# Patient Record
Sex: FEMALE | Race: WHITE | NOT HISPANIC OR LATINO | Employment: FULL TIME | URBAN - METROPOLITAN AREA
[De-identification: names, ages, dates, MRNs, and addresses within clinical notes are randomized per-mention and may not be internally consistent; named-entity substitution may affect disease eponyms.]

---

## 2019-08-22 ENCOUNTER — HOSPITAL ENCOUNTER (EMERGENCY)
Facility: HOSPITAL | Age: 24
Discharge: HOME | End: 2019-08-22
Attending: EMERGENCY MEDICINE
Payer: COMMERCIAL

## 2019-08-22 VITALS
SYSTOLIC BLOOD PRESSURE: 125 MMHG | WEIGHT: 268 LBS | RESPIRATION RATE: 20 BRPM | BODY MASS INDEX: 40.75 KG/M2 | TEMPERATURE: 98 F | OXYGEN SATURATION: 98 % | DIASTOLIC BLOOD PRESSURE: 80 MMHG | HEART RATE: 63 BPM

## 2019-08-22 DIAGNOSIS — R10.31 RLQ ABDOMINAL PAIN: Primary | ICD-10-CM

## 2019-08-22 LAB
ALBUMIN SERPL BCP-MCNC: 3.4 G/DL (ref 3.5–5.2)
ALP SERPL-CCNC: 78 U/L (ref 55–135)
ALT SERPL W/O P-5'-P-CCNC: 10 U/L (ref 10–44)
ANION GAP SERPL CALC-SCNC: 11 MMOL/L (ref 8–16)
AST SERPL-CCNC: 12 U/L (ref 10–40)
B-HCG UR QL: NEGATIVE
BILIRUB SERPL-MCNC: 0.2 MG/DL (ref 0.1–1)
BILIRUB UR QL STRIP: NEGATIVE
BUN SERPL-MCNC: 10 MG/DL (ref 6–20)
CALCIUM SERPL-MCNC: 9.2 MG/DL (ref 8.7–10.5)
CHLORIDE SERPL-SCNC: 105 MMOL/L (ref 95–110)
CLARITY UR: CLEAR
CO2 SERPL-SCNC: 23 MMOL/L (ref 23–29)
COLOR UR: YELLOW
CREAT SERPL-MCNC: 0.7 MG/DL (ref 0.5–1.4)
CTP QC/QA: YES
ERYTHROCYTE [DISTWIDTH] IN BLOOD BY AUTOMATED COUNT: 12.8 % (ref 11.5–14.5)
EST. GFR  (AFRICAN AMERICAN): >60 ML/MIN/1.73 M^2
EST. GFR  (NON AFRICAN AMERICAN): >60 ML/MIN/1.73 M^2
GLUCOSE SERPL-MCNC: 103 MG/DL (ref 70–110)
GLUCOSE UR QL STRIP: NEGATIVE
HCT VFR BLD AUTO: 39.7 % (ref 37–48.5)
HGB BLD-MCNC: 13.1 G/DL (ref 12–16)
HGB UR QL STRIP: NEGATIVE
KETONES UR QL STRIP: NEGATIVE
LEUKOCYTE ESTERASE UR QL STRIP: NEGATIVE
LIPASE SERPL-CCNC: 25 U/L (ref 4–60)
MCH RBC QN AUTO: 28.4 PG (ref 27–31)
MCHC RBC AUTO-ENTMCNC: 33 G/DL (ref 32–36)
MCV RBC AUTO: 86 FL (ref 82–98)
NITRITE UR QL STRIP: NEGATIVE
PH UR STRIP: 7 [PH] (ref 5–8)
PLATELET # BLD AUTO: 324 K/UL (ref 150–350)
PMV BLD AUTO: 9.6 FL (ref 9.2–12.9)
POTASSIUM SERPL-SCNC: 4.5 MMOL/L (ref 3.5–5.1)
PROT SERPL-MCNC: 7.3 G/DL (ref 6–8.4)
PROT UR QL STRIP: NEGATIVE
RBC # BLD AUTO: 4.61 M/UL (ref 4–5.4)
SODIUM SERPL-SCNC: 139 MMOL/L (ref 136–145)
SP GR UR STRIP: 1.01 (ref 1–1.03)
URN SPEC COLLECT METH UR: NORMAL
UROBILINOGEN UR STRIP-ACNC: NEGATIVE EU/DL
WBC # BLD AUTO: 7.7 K/UL (ref 3.9–12.7)

## 2019-08-22 PROCEDURE — 80053 COMPREHEN METABOLIC PANEL: CPT

## 2019-08-22 PROCEDURE — 96375 TX/PRO/DX INJ NEW DRUG ADDON: CPT

## 2019-08-22 PROCEDURE — 96374 THER/PROPH/DIAG INJ IV PUSH: CPT

## 2019-08-22 PROCEDURE — 81025 URINE PREGNANCY TEST: CPT | Performed by: EMERGENCY MEDICINE

## 2019-08-22 PROCEDURE — 85027 COMPLETE CBC AUTOMATED: CPT

## 2019-08-22 PROCEDURE — 96361 HYDRATE IV INFUSION ADD-ON: CPT

## 2019-08-22 PROCEDURE — 99284 EMERGENCY DEPT VISIT MOD MDM: CPT | Mod: 25

## 2019-08-22 PROCEDURE — 25000003 PHARM REV CODE 250: Performed by: EMERGENCY MEDICINE

## 2019-08-22 PROCEDURE — 63600175 PHARM REV CODE 636 W HCPCS: Performed by: EMERGENCY MEDICINE

## 2019-08-22 PROCEDURE — 83690 ASSAY OF LIPASE: CPT

## 2019-08-22 PROCEDURE — 81003 URINALYSIS AUTO W/O SCOPE: CPT

## 2019-08-22 RX ORDER — MORPHINE SULFATE 4 MG/ML
4 INJECTION, SOLUTION INTRAMUSCULAR; INTRAVENOUS
Status: COMPLETED | OUTPATIENT
Start: 2019-08-22 | End: 2019-08-22

## 2019-08-22 RX ORDER — KETOROLAC TROMETHAMINE 30 MG/ML
15 INJECTION, SOLUTION INTRAMUSCULAR; INTRAVENOUS
Status: COMPLETED | OUTPATIENT
Start: 2019-08-22 | End: 2019-08-22

## 2019-08-22 RX ORDER — SODIUM CHLORIDE 9 MG/ML
500 INJECTION, SOLUTION INTRAVENOUS
Status: COMPLETED | OUTPATIENT
Start: 2019-08-22 | End: 2019-08-22

## 2019-08-22 RX ORDER — DICYCLOMINE HYDROCHLORIDE 10 MG/1
20 CAPSULE ORAL
Status: COMPLETED | OUTPATIENT
Start: 2019-08-22 | End: 2019-08-22

## 2019-08-22 RX ORDER — ONDANSETRON 2 MG/ML
4 INJECTION INTRAMUSCULAR; INTRAVENOUS
Status: COMPLETED | OUTPATIENT
Start: 2019-08-22 | End: 2019-08-22

## 2019-08-22 RX ADMIN — KETOROLAC TROMETHAMINE 15 MG: 30 INJECTION, SOLUTION INTRAMUSCULAR at 07:08

## 2019-08-22 RX ADMIN — ONDANSETRON 4 MG: 2 INJECTION INTRAMUSCULAR; INTRAVENOUS at 07:08

## 2019-08-22 RX ADMIN — MORPHINE SULFATE 4 MG: 4 INJECTION INTRAVENOUS at 07:08

## 2019-08-22 RX ADMIN — SODIUM CHLORIDE 500 ML: 0.9 INJECTION, SOLUTION INTRAVENOUS at 07:08

## 2019-08-22 RX ADMIN — DICYCLOMINE HYDROCHLORIDE 20 MG: 10 CAPSULE ORAL at 08:08

## 2019-08-22 NOTE — ED NOTES
Pt presents to the ED w/ c/ of RLQ abd pain with nausea/ vomiting for the past 2days. Pt reports 3 episodes of emesis for the past 2 days. Denies diarrhea or dysuria. Pt is NAD.

## 2019-08-22 NOTE — ED PROVIDER NOTES
Encounter Date: 8/22/2019    SCRIBE #1 NOTE: I, Kun Frederick, am scribing for, and in the presence of,  . I have scribed the entire note.       History     Chief Complaint   Patient presents with    Abdominal Pain     To ER with c/o RLQ abd pain and nausea/vomiting x 2 days.  Pt denies urinary symptoms.    Nausea    Vomiting     Kathy Beltrán is a 24 y.o. female who  has no past medical history on file.    The patient presents to the ED due to abdominal pain with associated nausea and vomiting.   The pain has been intermittent for the last 2 days, located to RLQ, and has been intermittent. She reports the pain has been gradually worsening.   She denies any fever, vaginal bleeding/discharge, dysuria, hematuria, constipation/diarrhea, or appetite change. She denies any prior similar episodes in the past. She reports no history of kidney stones or ovarian cysts. She did not take anything for her symptoms at home.   LMP was 1 month ago.    The history is provided by the patient.     Review of patient's allergies indicates:   Allergen Reactions    Pcn [penicillins]     Sulfa (sulfonamide antibiotics)      No past medical history on file.  Past Surgical History:   Procedure Laterality Date    BREAST BIOPSY      CHOLECYSTECTOMY      CHOLESTEATOMA EXCISION       No family history on file.  Social History     Tobacco Use    Smoking status: Never Smoker   Substance Use Topics    Alcohol use: No    Drug use: Not on file     Review of Systems   Constitutional: Negative for chills and fever.   HENT: Negative for sore throat.    Respiratory: Negative for cough and shortness of breath.    Cardiovascular: Negative for chest pain.   Gastrointestinal: Positive for abdominal pain, nausea and vomiting. Negative for diarrhea.   Genitourinary: Negative for dysuria, frequency, urgency and vaginal bleeding.   Musculoskeletal: Negative for back pain.   Skin: Negative for rash and wound.   Neurological: Negative for  syncope and weakness.   Hematological: Does not bruise/bleed easily.   Psychiatric/Behavioral: Negative for agitation, behavioral problems and confusion.       Physical Exam     Initial Vitals [08/22/19 0700]   BP Pulse Resp Temp SpO2   (!) 158/99 83 18 98.8 °F (37.1 °C) 98 %      MAP       --         Physical Exam    Nursing note and vitals reviewed.  Constitutional: She appears well-developed and well-nourished. She is not diaphoretic. No distress.   HENT:   Head: Normocephalic and atraumatic.   Mouth/Throat: Oropharynx is clear and moist.   Eyes: EOM are normal. Pupils are equal, round, and reactive to light.   Neck: No tracheal deviation present.   Cardiovascular: Normal rate, regular rhythm, normal heart sounds and intact distal pulses.   Pulmonary/Chest: Breath sounds normal. No stridor. No respiratory distress. She has no wheezes.   Abdominal: Soft. Bowel sounds are normal. She exhibits no distension and no mass. There is tenderness in the right lower quadrant. There is no rigidity, no rebound, no guarding, no CVA tenderness, no tenderness at McBurney's point and negative Amaro's sign.   Obese abdomen, soft, no rebound/guarding.   Musculoskeletal: Normal range of motion. She exhibits no edema.   Neurological: She is alert and oriented to person, place, and time. She has normal strength. No cranial nerve deficit or sensory deficit.   Skin: Skin is warm and dry. Capillary refill takes less than 2 seconds. No pallor.   Psychiatric: She has a normal mood and affect. Her behavior is normal. Thought content normal.         ED Course   Procedures  Labs Reviewed   COMPREHENSIVE METABOLIC PANEL - Abnormal; Notable for the following components:       Result Value    Albumin 3.4 (*)     All other components within normal limits   URINALYSIS, REFLEX TO URINE CULTURE    Narrative:     Preferred Collection Type->Urine, Clean Catch   CBC WITHOUT DIFFERENTIAL   LIPASE   POCT URINE PREGNANCY          Imaging Results     None          Medical Decision Making:   Differential Diagnosis:   Differential Diagnosis includes, but is not limited to:  AAA, aortic dissection, mesenteric ischemia, perforated viscous, MI/ACS, SBO/volvulus, incarcerated/strangulated hernia, intussusception, ileus, appendicitis, cholecystitis, cholangitis, diverticulitis, esophagitis, hepatitis, nephrolithiasis, pancreatitis, gastroenteritis, colitis, IBD/IBS, biliary colic, GERD, PUD, constipation, UTI/pyelonephritis,  disorder.    Clinical Tests:   Lab Tests: Ordered and Reviewed  Upon re-evaluation, the patient's status has improved.  After complete ED evaluation, clinical impression is most consistent with RLQ abdominal pain.  PCP follow-up within 2-3 days was recommended.    After taking into careful account the patient's history, physical exam findings, as well as empirical and objective data obtained throughout ED workup, I feel no emergent medical condition has been identified. No further evaluation or admission was felt to be required, and the patient is stable for discharge from the ED. The patient and any additional family present were updated with test results, overall clinical impression, and recommended further plan of care, including discharge instructions as provided and outpatient follow-up for continued evaluation and management as needed. All questions were answered. The patient expressed understanding and agreed with current plan for discharge and follow-up plan of care. Strict ED return precautions were provided, including return/worsening of current symptoms, new symptoms, or any other concerns.                     ED Course as of Aug 22 1022   Thu Aug 22, 2019   0727 24-year-old female presents to ED with 2 days of generalized abdominal pain, gradually worsening, now located to right lower quadrant, associated with nausea and vomiting. Denies any fever, back pain, or urinary complaints. History of cholecystectomy.  On arrival, vitals  unremarkable, exam with right lower quadrant tenderness, no rebound or guarding.  Will obtain labs, treat symptomatically, and consider CT for further evaluation pending reassessment.    [SS]   0911 Labs unremarkable. On reassessment, patient reports no significant change in her pain level.  Discussed results extensively with patient and , and patient was offered options including CT A/P now versus watchful waiting with symptomatic care at home, as early appendicitis remains on DDX.  After long discussion of options, patient and family comfortable with return home with close observation; she will return to the ED for worsening symptoms, fever, nausea/vomiting, or any other concerns.  She is comfortable with plan at this time.    [SS]      ED Course User Index  [SS] Philippe Billingsley MD     Clinical Impression:       ICD-10-CM ICD-9-CM   1. RLQ abdominal pain R10.31 789.03           Disposition:   Disposition: Discharged  Condition: Stable         I, Dr. Philippe Billingsley, personally performed the services described in this documentation. All medical record entries made by the scribe were at my direction and in my presence.  I have reviewed the chart and agree that the record reflects my personal performance and is accurate and complete.     Philippe Billingsley MD.  10:23 AM 08/22/2019         Philippe Billingsley MD  08/22/19 1025

## 2019-08-23 ENCOUNTER — HOSPITAL ENCOUNTER (OUTPATIENT)
Dept: RADIOLOGY | Facility: HOSPITAL | Age: 24
Discharge: HOME | End: 2019-08-23
Attending: SURGERY
Payer: COMMERCIAL

## 2019-08-23 DIAGNOSIS — R10.31 ABDOMINAL PAIN, RIGHT LOWER QUADRANT: Primary | ICD-10-CM

## 2019-08-23 DIAGNOSIS — R10.31 ABDOMINAL PAIN, RIGHT LOWER QUADRANT: ICD-10-CM

## 2019-08-23 PROCEDURE — 76705 ECHO EXAM OF ABDOMEN: CPT | Mod: TC

## 2019-08-23 PROCEDURE — 76705 ECHO EXAM OF ABDOMEN: CPT | Mod: 26,,, | Performed by: RADIOLOGY

## 2019-08-23 PROCEDURE — 76705 US ABDOMEN LIMITED: ICD-10-PCS | Mod: 26,,, | Performed by: RADIOLOGY

## 2021-03-29 ENCOUNTER — IMMUNIZATION (OUTPATIENT)
Dept: FAMILY MEDICINE | Facility: CLINIC | Age: 26
End: 2021-03-29
Payer: COMMERCIAL

## 2021-03-29 DIAGNOSIS — Z23 NEED FOR VACCINATION: Primary | ICD-10-CM

## 2021-03-29 PROCEDURE — 91300 COVID-19, MRNA, LNP-S, PF, 30 MCG/0.3 ML DOSE VACCINE: CPT | Mod: PBBFAC | Performed by: FAMILY MEDICINE

## 2021-04-20 ENCOUNTER — IMMUNIZATION (OUTPATIENT)
Dept: FAMILY MEDICINE | Facility: CLINIC | Age: 26
End: 2021-04-20
Payer: COMMERCIAL

## 2021-04-20 DIAGNOSIS — Z23 NEED FOR VACCINATION: Primary | ICD-10-CM

## 2021-04-20 PROCEDURE — 91300 COVID-19, MRNA, LNP-S, PF, 30 MCG/0.3 ML DOSE VACCINE: CPT | Mod: PBBFAC | Performed by: FAMILY MEDICINE

## 2021-04-20 PROCEDURE — 0002A COVID-19, MRNA, LNP-S, PF, 30 MCG/0.3 ML DOSE VACCINE: CPT | Mod: PBBFAC | Performed by: FAMILY MEDICINE

## 2023-02-07 ENCOUNTER — HOSPITAL ENCOUNTER (EMERGENCY)
Facility: HOSPITAL | Age: 28
Discharge: HOME | End: 2023-02-07
Attending: SURGERY
Payer: COMMERCIAL

## 2023-02-07 VITALS
RESPIRATION RATE: 18 BRPM | OXYGEN SATURATION: 100 % | SYSTOLIC BLOOD PRESSURE: 147 MMHG | DIASTOLIC BLOOD PRESSURE: 97 MMHG | TEMPERATURE: 98 F | HEART RATE: 65 BPM | WEIGHT: 259.5 LBS | BODY MASS INDEX: 39.45 KG/M2

## 2023-02-07 DIAGNOSIS — K29.70 GASTRITIS, PRESENCE OF BLEEDING UNSPECIFIED, UNSPECIFIED CHRONICITY, UNSPECIFIED GASTRITIS TYPE: Primary | ICD-10-CM

## 2023-02-07 LAB
ALBUMIN SERPL BCP-MCNC: 4 G/DL (ref 3.5–5.2)
ALP SERPL-CCNC: 63 U/L (ref 55–135)
ALT SERPL W/O P-5'-P-CCNC: 12 U/L (ref 10–44)
AMPHET+METHAMPHET UR QL: NEGATIVE
ANION GAP SERPL CALC-SCNC: 11 MMOL/L (ref 8–16)
AST SERPL-CCNC: 15 U/L (ref 10–40)
B-HCG UR QL: NEGATIVE
BARBITURATES UR QL SCN>200 NG/ML: NEGATIVE
BASOPHILS # BLD AUTO: 0.03 K/UL (ref 0–0.2)
BASOPHILS NFR BLD: 0.3 % (ref 0–1.9)
BENZODIAZ UR QL SCN>200 NG/ML: NEGATIVE
BILIRUB SERPL-MCNC: 0.5 MG/DL (ref 0.1–1)
BILIRUB UR QL STRIP: NEGATIVE
BUN SERPL-MCNC: 12 MG/DL (ref 6–20)
BZE UR QL SCN: NEGATIVE
CALCIUM SERPL-MCNC: 9.5 MG/DL (ref 8.7–10.5)
CANNABINOIDS UR QL SCN: NEGATIVE
CHLORIDE SERPL-SCNC: 106 MMOL/L (ref 95–110)
CLARITY UR: CLEAR
CO2 SERPL-SCNC: 22 MMOL/L (ref 23–29)
COLOR UR: YELLOW
CREAT SERPL-MCNC: 0.8 MG/DL (ref 0.5–1.4)
CREAT UR-MCNC: 81.8 MG/DL (ref 15–325)
DIFFERENTIAL METHOD: NORMAL
EOSINOPHIL # BLD AUTO: 0.1 K/UL (ref 0–0.5)
EOSINOPHIL NFR BLD: 1.4 % (ref 0–8)
ERYTHROCYTE [DISTWIDTH] IN BLOOD BY AUTOMATED COUNT: 13.2 % (ref 11.5–14.5)
EST. GFR  (NO RACE VARIABLE): >60 ML/MIN/1.73 M^2
GLUCOSE SERPL-MCNC: 87 MG/DL (ref 70–110)
GLUCOSE UR QL STRIP: NEGATIVE
HCT VFR BLD AUTO: 44.6 % (ref 37–48.5)
HGB BLD-MCNC: 14.4 G/DL (ref 12–16)
HGB UR QL STRIP: NEGATIVE
IMM GRANULOCYTES # BLD AUTO: 0.02 K/UL (ref 0–0.04)
IMM GRANULOCYTES NFR BLD AUTO: 0.2 % (ref 0–0.5)
KETONES UR QL STRIP: NEGATIVE
LEUKOCYTE ESTERASE UR QL STRIP: NEGATIVE
LIPASE SERPL-CCNC: 28 U/L (ref 4–60)
LYMPHOCYTES # BLD AUTO: 3 K/UL (ref 1–4.8)
LYMPHOCYTES NFR BLD: 32.4 % (ref 18–48)
MCH RBC QN AUTO: 28 PG (ref 27–31)
MCHC RBC AUTO-ENTMCNC: 32.3 G/DL (ref 32–36)
MCV RBC AUTO: 87 FL (ref 82–98)
METHADONE UR QL SCN>300 NG/ML: NEGATIVE
MONOCYTES # BLD AUTO: 0.6 K/UL (ref 0.3–1)
MONOCYTES NFR BLD: 6.1 % (ref 4–15)
NEUTROPHILS # BLD AUTO: 5.5 K/UL (ref 1.8–7.7)
NEUTROPHILS NFR BLD: 59.6 % (ref 38–73)
NITRITE UR QL STRIP: NEGATIVE
NRBC BLD-RTO: 0 /100 WBC
OPIATES UR QL SCN: NEGATIVE
PCP UR QL SCN>25 NG/ML: NEGATIVE
PH UR STRIP: 6 [PH] (ref 5–8)
PLATELET # BLD AUTO: 258 K/UL (ref 150–450)
PMV BLD AUTO: 9.9 FL (ref 9.2–12.9)
POTASSIUM SERPL-SCNC: 4.9 MMOL/L (ref 3.5–5.1)
PROT SERPL-MCNC: 7.6 G/DL (ref 6–8.4)
PROT UR QL STRIP: NEGATIVE
RBC # BLD AUTO: 5.14 M/UL (ref 4–5.4)
SODIUM SERPL-SCNC: 139 MMOL/L (ref 136–145)
SP GR UR STRIP: 1.01 (ref 1–1.03)
TOXICOLOGY INFORMATION: NORMAL
URN SPEC COLLECT METH UR: NORMAL
UROBILINOGEN UR STRIP-ACNC: NEGATIVE EU/DL
WBC # BLD AUTO: 9.21 K/UL (ref 3.9–12.7)

## 2023-02-07 PROCEDURE — 63600175 PHARM REV CODE 636 W HCPCS: Performed by: NURSE PRACTITIONER

## 2023-02-07 PROCEDURE — 99285 EMERGENCY DEPT VISIT HI MDM: CPT | Mod: 25

## 2023-02-07 PROCEDURE — 81003 URINALYSIS AUTO W/O SCOPE: CPT | Mod: 59 | Performed by: NURSE PRACTITIONER

## 2023-02-07 PROCEDURE — 25000003 PHARM REV CODE 250: Performed by: SURGERY

## 2023-02-07 PROCEDURE — 36415 COLL VENOUS BLD VENIPUNCTURE: CPT | Performed by: NURSE PRACTITIONER

## 2023-02-07 PROCEDURE — 63600175 PHARM REV CODE 636 W HCPCS: Performed by: SURGERY

## 2023-02-07 PROCEDURE — 81025 URINE PREGNANCY TEST: CPT | Performed by: NURSE PRACTITIONER

## 2023-02-07 PROCEDURE — 96375 TX/PRO/DX INJ NEW DRUG ADDON: CPT

## 2023-02-07 PROCEDURE — 83690 ASSAY OF LIPASE: CPT | Performed by: NURSE PRACTITIONER

## 2023-02-07 PROCEDURE — 80053 COMPREHEN METABOLIC PANEL: CPT | Performed by: NURSE PRACTITIONER

## 2023-02-07 PROCEDURE — 25000003 PHARM REV CODE 250: Performed by: NURSE PRACTITIONER

## 2023-02-07 PROCEDURE — 25500020 PHARM REV CODE 255: Performed by: SURGERY

## 2023-02-07 PROCEDURE — 96361 HYDRATE IV INFUSION ADD-ON: CPT

## 2023-02-07 PROCEDURE — 80307 DRUG TEST PRSMV CHEM ANLYZR: CPT | Performed by: NURSE PRACTITIONER

## 2023-02-07 PROCEDURE — 85025 COMPLETE CBC W/AUTO DIFF WBC: CPT | Performed by: NURSE PRACTITIONER

## 2023-02-07 PROCEDURE — 96365 THER/PROPH/DIAG IV INF INIT: CPT

## 2023-02-07 RX ORDER — SODIUM CHLORIDE 9 MG/ML
1000 INJECTION, SOLUTION INTRAVENOUS
Status: COMPLETED | OUTPATIENT
Start: 2023-02-07 | End: 2023-02-07

## 2023-02-07 RX ORDER — ONDANSETRON 4 MG/1
4 TABLET, ORALLY DISINTEGRATING ORAL EVERY 8 HOURS PRN
Qty: 20 TABLET | Refills: 0 | Status: SHIPPED | OUTPATIENT
Start: 2023-02-07 | End: 2023-04-05

## 2023-02-07 RX ORDER — PANTOPRAZOLE SODIUM 40 MG/1
40 TABLET, DELAYED RELEASE ORAL DAILY
Qty: 30 TABLET | Refills: 0 | Status: SHIPPED | OUTPATIENT
Start: 2023-02-07 | End: 2023-03-28 | Stop reason: SDUPTHER

## 2023-02-07 RX ORDER — LIDOCAINE HYDROCHLORIDE 20 MG/ML
15 SOLUTION OROPHARYNGEAL ONCE
Status: COMPLETED | OUTPATIENT
Start: 2023-02-07 | End: 2023-02-07

## 2023-02-07 RX ORDER — ONDANSETRON 2 MG/ML
4 INJECTION INTRAMUSCULAR; INTRAVENOUS
Status: COMPLETED | OUTPATIENT
Start: 2023-02-07 | End: 2023-02-07

## 2023-02-07 RX ORDER — MAG HYDROX/ALUMINUM HYD/SIMETH 200-200-20
30 SUSPENSION, ORAL (FINAL DOSE FORM) ORAL ONCE
Status: COMPLETED | OUTPATIENT
Start: 2023-02-07 | End: 2023-02-07

## 2023-02-07 RX ORDER — KETOROLAC TROMETHAMINE 30 MG/ML
30 INJECTION, SOLUTION INTRAMUSCULAR; INTRAVENOUS
Status: COMPLETED | OUTPATIENT
Start: 2023-02-07 | End: 2023-02-07

## 2023-02-07 RX ORDER — MORPHINE SULFATE 2 MG/ML
2 INJECTION, SOLUTION INTRAMUSCULAR; INTRAVENOUS
Status: COMPLETED | OUTPATIENT
Start: 2023-02-07 | End: 2023-02-07

## 2023-02-07 RX ORDER — DICYCLOMINE HYDROCHLORIDE 20 MG/1
20 TABLET ORAL 4 TIMES DAILY PRN
Qty: 15 TABLET | Refills: 0 | Status: SHIPPED | OUTPATIENT
Start: 2023-02-07 | End: 2023-03-28 | Stop reason: ALTCHOICE

## 2023-02-07 RX ADMIN — ONDANSETRON HYDROCHLORIDE 4 MG: 2 INJECTION, SOLUTION INTRAMUSCULAR; INTRAVENOUS at 07:02

## 2023-02-07 RX ADMIN — LIDOCAINE HYDROCHLORIDE 15 ML: 20 SOLUTION ORAL; TOPICAL at 06:02

## 2023-02-07 RX ADMIN — MORPHINE SULFATE 2 MG: 2 INJECTION, SOLUTION INTRAMUSCULAR; INTRAVENOUS at 08:02

## 2023-02-07 RX ADMIN — ALUMINUM HYDROXIDE, MAGNESIUM HYDROXIDE, AND DIMETHICONE 30 ML: 200; 20; 200 SUSPENSION ORAL at 06:02

## 2023-02-07 RX ADMIN — KETOROLAC TROMETHAMINE 30 MG: 30 INJECTION, SOLUTION INTRAMUSCULAR; INTRAVENOUS at 09:02

## 2023-02-07 RX ADMIN — ALUMINUM HYDROXIDE, MAGNESIUM HYDROXIDE, AND DIMETHICONE 30 ML: 200; 20; 200 SUSPENSION ORAL at 10:02

## 2023-02-07 RX ADMIN — LIDOCAINE HYDROCHLORIDE 15 ML: 20 SOLUTION ORAL at 10:02

## 2023-02-07 RX ADMIN — IOHEXOL 100 ML: 350 INJECTION, SOLUTION INTRAVENOUS at 08:02

## 2023-02-07 RX ADMIN — PROMETHAZINE HYDROCHLORIDE 12.5 MG: 25 INJECTION INTRAMUSCULAR; INTRAVENOUS at 09:02

## 2023-02-07 RX ADMIN — SODIUM CHLORIDE 1000 ML: 0.9 INJECTION, SOLUTION INTRAVENOUS at 07:02

## 2023-02-08 NOTE — ED PROVIDER NOTES
Encounter Date: 2/7/2023       History     Chief Complaint   Patient presents with    Abdominal Pain     Patient to ER CC of epigastric pain flare ups for a few weeks      Kathy Beltrán is a 27 y.o. female with no significant PMH who presents to the ED for evaluation of abdominal pain.  Patient presents with a 2 week history of intermittent epigastric pain.  Pain is currently rated 8/10 in severity.  She denies associated nausea, vomiting, or diarrhea.  She does note that pain increases with food intake.  Denies EtOH abuse or excessive use of NSAIDs.  Denies history of GERD.  Denies fever, chills, or body aches    The history is provided by the patient.   Review of patient's allergies indicates:   Allergen Reactions    Pcn [penicillins]     Sulfa (sulfonamide antibiotics)      History reviewed. No pertinent past medical history.  Past Surgical History:   Procedure Laterality Date    BREAST BIOPSY      CHOLECYSTECTOMY      CHOLESTEATOMA EXCISION       History reviewed. No pertinent family history.  Social History     Tobacco Use    Smoking status: Never   Substance Use Topics    Alcohol use: No     Review of Systems   Constitutional:  Negative for chills.   HENT:  Negative for congestion and sore throat.    Eyes:  Negative for pain.   Respiratory:  Negative for cough and shortness of breath.    Cardiovascular:  Negative for chest pain and leg swelling.   Gastrointestinal:  Positive for abdominal pain. Negative for diarrhea, nausea and vomiting.   Genitourinary:  Negative for flank pain.   Musculoskeletal:  Negative for back pain.   Neurological:  Negative for headaches.     Physical Exam     Initial Vitals [02/07/23 1810]   BP Pulse Resp Temp SpO2   (!) 163/114 73 18 98.1 °F (36.7 °C) 99 %      MAP       --         Physical Exam    Nursing note and vitals reviewed.  Constitutional: She appears well-developed and well-nourished.   HENT:   Head: Normocephalic and atraumatic.   Right Ear: Tympanic membrane, external  ear and ear canal normal. Tympanic membrane is not erythematous. No middle ear effusion.   Left Ear: Tympanic membrane, external ear and ear canal normal. Tympanic membrane is not erythematous.  No middle ear effusion.   Nose: Nose normal.   Mouth/Throat: Uvula is midline, oropharynx is clear and moist and mucous membranes are normal. Mucous membranes are not pale and not dry.   Eyes: Conjunctivae and EOM are normal. Pupils are equal, round, and reactive to light.   Neck: Neck supple.   Normal range of motion.  Cardiovascular:  Normal rate, regular rhythm, normal heart sounds and intact distal pulses.           Pulmonary/Chest: Effort normal and breath sounds normal. She has no decreased breath sounds. She has no wheezes. She has no rhonchi. She has no rales.   Abdominal: Abdomen is soft. Bowel sounds are normal. There is no abdominal tenderness.   Musculoskeletal:         General: Normal range of motion.      Cervical back: Normal range of motion and neck supple.     Neurological: She is alert and oriented to person, place, and time. She has normal strength. She displays normal reflexes. No cranial nerve deficit or sensory deficit.   Skin: Skin is warm and dry. Capillary refill takes less than 2 seconds. No rash noted.   Psychiatric: She has a normal mood and affect. Her behavior is normal. Judgment and thought content normal.       ED Course   Procedures  Labs Reviewed   COMPREHENSIVE METABOLIC PANEL - Abnormal; Notable for the following components:       Result Value    CO2 22 (*)     All other components within normal limits   CBC W/ AUTO DIFFERENTIAL   LIPASE   URINALYSIS, REFLEX TO URINE CULTURE    Narrative:     Specimen Source->Urine   PREGNANCY TEST, URINE RAPID    Narrative:     Specimen Source->Urine   DRUG SCREEN PANEL, URINE EMERGENCY    Narrative:     Specimen Source->Urine          Imaging Results              CT Abdomen Pelvis With Contrast (Final result)  Result time 02/07/23 22:26:09      Final  result by Shane Em MD (02/07/23 22:26:09)                   Impression:      1. No acute abnormality.  2. Mild splenomegaly.  3. Small fat containing umbilical hernia.  4. Mildly enlarged left inguinal lymph node.  5. Intrauterine device is present.      Electronically signed by: Shane Em  Date:    02/07/2023  Time:    22:26               Narrative:    EXAMINATION:  CT ABDOMEN PELVIS WITH CONTRAST    CLINICAL HISTORY:  Epigastric pain;    TECHNIQUE:  Low dose axial images, sagittal and coronal reformations were obtained from the lung bases to the pubic symphysis following the IV administration of 100 mL of Omnipaque 350 .  Oral contrast was not administered.    COMPARISON:  10/15/2013    FINDINGS:  Abdomen:    - Lower thorax:    - Lung bases: No infiltrates and no nodules.    - Liver: No focal mass.    - Gallbladder: Status post cholecystectomy.    - Bile Ducts: No evidence of intra or extra hepatic biliary ductal dilation.    - Spleen: Spleen is mildly enlarged.  No focal abnormality.    - Kidneys: No mass or hydronephrosis.    - Adrenals: Unremarkable.    - Pancreas: No mass or peripancreatic fat stranding.    - Retroperitoneum:  No significant adenopathy.    - Vascular: No abdominal aortic aneurysm.    - Abdominal wall:  Small fat containing umbilical hernia.    Pelvis:    Urinary bladder is within normal limits.    The uterus is present.  Intrauterine device is noted.  No adnexal mass.    The appendix is within normal limits.    Mild fluid distention of the stomach with air-fluid level.    Bowel/Mesentery:    No evidence of bowel obstruction or inflammation.    Bones:  No acute osseous abnormality and no suspicious lytic or blastic lesion.    Mildly enlarged left inguinal lymph node measuring 1.3 cm on axial 180.                                       Medications   aluminum-magnesium hydroxide-simethicone 200-200-20 mg/5 mL suspension 30 mL (30 mLs Oral Given 2/7/23 0353)     And   LIDOcaine HCl 2%  oral solution 15 mL (15 mLs Oral Given 23)   0.9%  NaCl infusion (0 mLs Intravenous Stopped 23)   ondansetron injection 4 mg (4 mg Intravenous Given 23)   iohexoL (OMNIPAQUE 350) injection 100 mL (100 mLs Intravenous Given 23)   morphine injection 2 mg (2 mg Intravenous Given 23)   promethazine (PHENERGAN) 12.5 mg in dextrose 5 % (D5W) 50 mL IVPB (0 mg Intravenous Stopped 23)   ketorolac injection 30 mg (30 mg Intravenous Given 23)     Medical Decision Makin-year-old female presents with epigastric pain for last 2 weeks now  Patient with no obvious signs of peritonitis on ER evaluation today  Patient is status post cholecystectomy, has no previous ulcer/GERD history  Normal lab work in the emergency room, (-) lipase, (-) urinalysis & UPT  CT scan showed no acute findings, GI cocktail helped the pain today  Patient counseled she needs follow-up with [facility]   Patient recently started semaglutide medication weekly, possible side effect  Follow-up with her physician, take medication, bland diet for next 2 weeks  Patient counseled to return to the ER with any concerning symptoms                        Clinical Impression:   Final diagnoses:  [K29.70] Gastritis, presence of bleeding unspecified, unspecified chronicity, unspecified gastritis type (Primary)        ED Disposition Condition    Discharge Stable          ED Prescriptions       Medication Sig Dispense Start Date End Date Auth. Provider    pantoprazole (PROTONIX) 40 MG tablet Take 1 tablet (40 mg total) by mouth once daily. 30 tablet 2023 3/9/2023 Blake Reeves MD    ondansetron (ZOFRAN-ODT) 4 MG TbDL Take 1 tablet (4 mg total) by mouth every 8 (eight) hours as needed (nausea). 20 tablet 2023 -- Blake Reeves MD    dicyclomine (BENTYL) 20 mg tablet Take 1 tablet (20 mg total) by mouth 4 (four) times daily as needed (CRAMPS). 15 tablet 2023 3/9/2023 Blake IRWIN  MD Leandro          Follow-up Information       Follow up With Specialties Details Why Contact Info    Glenn Gill MD Gastroenterology Go in 2 days  [address]  [address]  [place] [address]  [phone]      Beto Palomo MD Internal Medicine, Gastroenterology Go in 2 days  [address]  [address]  [place] [address]  [phone]      Jhoan Patterson Jr., MD Family Medicine Schedule an appointment as soon as possible for a visit in 2 days  [address]  [place] [address]  [phone]               Blake Reeves MD  02/07/23 3725

## 2023-02-14 ENCOUNTER — LAB VISIT (OUTPATIENT)
Dept: LAB | Facility: HOSPITAL | Age: 28
End: 2023-02-14
Payer: COMMERCIAL

## 2023-02-14 ENCOUNTER — OFFICE VISIT (OUTPATIENT)
Dept: GASTROENTEROLOGY | Facility: CLINIC | Age: 28
End: 2023-02-14
Payer: COMMERCIAL

## 2023-02-14 VITALS — WEIGHT: 265.63 LBS | HEIGHT: 68 IN | BODY MASS INDEX: 40.26 KG/M2

## 2023-02-14 DIAGNOSIS — R10.12 LEFT UPPER QUADRANT ABDOMINAL PAIN: ICD-10-CM

## 2023-02-14 DIAGNOSIS — R19.7 DIARRHEA, UNSPECIFIED TYPE: ICD-10-CM

## 2023-02-14 DIAGNOSIS — R19.7 DIARRHEA, UNSPECIFIED TYPE: Primary | ICD-10-CM

## 2023-02-14 PROCEDURE — 3008F BODY MASS INDEX DOCD: CPT | Mod: CPTII,S$GLB,, | Performed by: NURSE PRACTITIONER

## 2023-02-14 PROCEDURE — 3008F PR BODY MASS INDEX (BMI) DOCUMENTED: ICD-10-PCS | Mod: CPTII,S$GLB,, | Performed by: NURSE PRACTITIONER

## 2023-02-14 PROCEDURE — 99204 OFFICE O/P NEW MOD 45 MIN: CPT | Mod: S$GLB,,, | Performed by: NURSE PRACTITIONER

## 2023-02-14 PROCEDURE — 99999 PR PBB SHADOW E&M-EST. PATIENT-LVL III: ICD-10-PCS | Mod: PBBFAC,,, | Performed by: NURSE PRACTITIONER

## 2023-02-14 PROCEDURE — 86364 TISS TRNSGLTMNASE EA IG CLAS: CPT | Performed by: NURSE PRACTITIONER

## 2023-02-14 PROCEDURE — 1159F MED LIST DOCD IN RCRD: CPT | Mod: CPTII,S$GLB,, | Performed by: NURSE PRACTITIONER

## 2023-02-14 PROCEDURE — 82784 ASSAY IGA/IGD/IGG/IGM EACH: CPT | Performed by: NURSE PRACTITIONER

## 2023-02-14 PROCEDURE — 36415 COLL VENOUS BLD VENIPUNCTURE: CPT | Performed by: NURSE PRACTITIONER

## 2023-02-14 PROCEDURE — 99999 PR PBB SHADOW E&M-EST. PATIENT-LVL III: CPT | Mod: PBBFAC,,, | Performed by: NURSE PRACTITIONER

## 2023-02-14 PROCEDURE — 86677 HELICOBACTER PYLORI ANTIBODY: CPT | Performed by: NURSE PRACTITIONER

## 2023-02-14 PROCEDURE — 1159F PR MEDICATION LIST DOCUMENTED IN MEDICAL RECORD: ICD-10-PCS | Mod: CPTII,S$GLB,, | Performed by: NURSE PRACTITIONER

## 2023-02-14 PROCEDURE — 99204 PR OFFICE/OUTPT VISIT, NEW, LEVL IV, 45-59 MIN: ICD-10-PCS | Mod: S$GLB,,, | Performed by: NURSE PRACTITIONER

## 2023-02-14 RX ORDER — TIRZEPATIDE 7.5 MG/.5ML
7.5 INJECTION, SOLUTION SUBCUTANEOUS
COMMUNITY
End: 2023-04-05

## 2023-02-14 RX ORDER — PANTOPRAZOLE SODIUM 40 MG/1
40 TABLET, DELAYED RELEASE ORAL DAILY
Qty: 30 TABLET | Refills: 0 | Status: SHIPPED | OUTPATIENT
Start: 2023-02-28 | End: 2023-07-07

## 2023-02-14 NOTE — PATIENT INSTRUCTIONS
Stool Collection Kit Instructions    Place stool Collection Hat under your toilet seat   With and disposable spoon scoop and fill the provided cup with stool to the half way antionette on the stool cup (if your stool is runny or liquid that is fine)  Place the top on the stool cup  Place the stool cup in the biohazard bag and zip the bag closed   Place the biohazard bag in the brown paper bag that is provided   Bring the stool sample to any [facility] lab (Any location where you can get blood work done)  Discard any extra equipment (throw away the stool hat, spoon WE DO NOT NEED THAT BACK)        If the sample is collected after clinic hours, please place in refrigerator or cooler until the next morning.  Please make sure that there is no Tissue, wipes, or other paper attached to the stool cup (the lab will have you repeat if something is attached to the cup).

## 2023-02-14 NOTE — PROGRESS NOTES
GASTROENTEROLOGY CLINIC NOTE    Chief Complaint: The primary encounter diagnosis was Diarrhea, unspecified type. A diagnosis of Left upper quadrant abdominal pain was also pertinent to this visit.  Referring provider/PCP: Jhoan Patterson Jr, MD    Kathy Beltrán is a 27 y.o. female who is a new patient to me without a significant GI PMH. She is here today to establish care for abdominal pain and diarrhea and is accompanied by her mother.  These are new problems that began about two weeks ago.  Initially began with sulfur belching followed by diarrhea then vomiting.  Symptoms improved for a few days then returned which prompted her to seek care at the emergency room.  CT was obtained which was unrevealing for any acute processes.  Symptoms improved with GI cocktail. She was discharged with Protonix and Bentyl.  She's had three of these episodes over the last two weeks.  Abdominal pain is worse with episodes.  Vomitus consists of old food and bile. No hematemesis, pyrosis, or water brash.  Has h/o irregular bowel movements and will alternate between 2-3 per day followed by few days without bowel movements.  During episodes she has frequent watery bowel movements.  No melena or hematochezia.  She has tried Imodium, ibuprofen, TUMS. Currently taking Protonix 40mg daily and Bentyl PRN which is helping.  She started Mounjaro in 9/2022 and has been on same dose since 11/2022.       Abdominal Pain  This is a new problem. The current episode started 1 to 4 weeks ago. The onset quality is sudden. The problem has been waxing and waning. The pain is located in the LUQ. The quality of the pain is cramping and sharp (with episodes). The abdominal pain radiates to the left flank. Associated symptoms include belching, diarrhea, nausea and vomiting. Pertinent negatives include no constipation, dysuria, headaches, hematochezia, melena, myalgias or weight loss. Associated symptoms comments: Decreased appetite,. The pain is  aggravated by eating.     NSAIDs: No  Anticoagulation or Antiplatelet: No      History of H.pylori: no  H.pylori Treatment:  Prior Upper Endoscopy: no  Prior Colonoscopy: no  Family h/o Colon Cancer: No  Family h/o Crohn's Disease or Ulcerative Colitis: No  Family h/o Celiac Sprue: No  Abdominal Surgeries: Cholecystectomy, C section    Review of Systems   Constitutional:  Negative for weight loss.   HENT:  Negative for sore throat.    Eyes:  Negative for blurred vision.   Respiratory:  Negative for cough.    Cardiovascular:  Negative for chest pain.   Gastrointestinal:  Positive for abdominal pain, diarrhea, nausea and vomiting. Negative for blood in stool, constipation, heartburn, hematochezia and melena.   Genitourinary:  Negative for dysuria.   Musculoskeletal:  Negative for myalgias.   Skin:  Negative for rash.   Neurological:  Negative for headaches.   Endo/Heme/Allergies:  Negative for environmental allergies.   Psychiatric/Behavioral:  Negative for suicidal ideas. The patient is not nervous/anxious.      Past Medical History: has no past medical history on file.    Past Surgical History: has a past surgical history that includes Breast biopsy; Cholecystectomy; and Cholesteatoma excision.    Family History:family history is not on file.    Allergies:   Review of patient's allergies indicates:   Allergen Reactions    Pcn [penicillins]     Sulfa (sulfonamide antibiotics)        Social History: reports that she has never smoked. She does not have any smokeless tobacco history on file. She reports that she does not drink alcohol.    Home medications:   Current Outpatient Medications on File Prior to Visit   Medication Sig Dispense Refill    dicyclomine (BENTYL) 20 mg tablet Take 1 tablet (20 mg total) by mouth 4 (four) times daily as needed (CRAMPS). 15 tablet 0    ondansetron (ZOFRAN-ODT) 4 MG TbDL Take 1 tablet (4 mg total) by mouth every 8 (eight) hours as needed (nausea). 20 tablet 0    pantoprazole (PROTONIX)  "40 MG tablet Take 1 tablet (40 mg total) by mouth once daily. 30 tablet 0    tirzepatide (MOUNJARO) 7.5 mg/0.5 mL PnIj Inject 7.5 mg into the skin every 7 days.      vilazodone (VIIBRYD) 20 mg Tab take 1/2 a tablet by mouth daily for 7 days then increase to 1 tablet by mouth daily 90 tablet 3    [DISCONTINUED] norgestimate-ethinyl estradiol (ORTHO TRI-CYCLEN,TRI-SPRINTEC) 0.18/0.215/0.25 mg-35 mcg (28) tablet Take 1 tablet by mouth once daily.      [DISCONTINUED] norgestimate-ethinyl estradiol (ORTHO TRI-CYCLEN,TRI-SPRINTEC) 0.18/0.215/0.25 mg-35 mcg (28) tablet TAKE 1 TABLET BY MOUTH ONCE DAILY 84 tablet 3    [DISCONTINUED] venlafaxine (EFFEXOR) 75 MG tablet Take 75 mg by mouth 2 (two) times daily.       No current facility-administered medications on file prior to visit.       Vital signs:  Ht 5' 8" (1.727 m)   Wt 120.5 kg (265 lb 10.5 oz)   BMI 40.39 kg/m²     Physical Exam  Vitals reviewed.   Constitutional:       General: She is not in acute distress.     Appearance: Normal appearance. She is not ill-appearing.   HENT:      Head: Normocephalic.   Cardiovascular:      Rate and Rhythm: Normal rate and regular rhythm.      Heart sounds: Normal heart sounds. No murmur heard.  Pulmonary:      Effort: Pulmonary effort is normal. No respiratory distress.      Breath sounds: Normal breath sounds.   Chest:      Chest wall: No tenderness.   Abdominal:      General: Bowel sounds are normal. There is no distension.      Palpations: Abdomen is soft.      Tenderness: There is abdominal tenderness in the right lower quadrant and left upper quadrant. Negative signs include Amaro's sign.      Hernia: No hernia is present.   Skin:     General: Skin is warm.   Neurological:      Mental Status: She is alert and oriented to person, place, and time.   Psychiatric:         Mood and Affect: Mood normal.         Behavior: Behavior normal.       Routine labs:  Lab Results   Component Value Date    WBC 9.21 02/07/2023    HGB 14.4 " 02/07/2023    HCT 44.6 02/07/2023    MCV 87 02/07/2023     02/07/2023     No results found for: INR  No results found for: IRON, FERRITIN, TIBC, FESATURATED  Lab Results   Component Value Date     02/07/2023    K 4.9 02/07/2023     02/07/2023    CO2 22 (L) 02/07/2023    BUN 12 02/07/2023    CREATININE 0.8 02/07/2023     Lab Results   Component Value Date    ALBUMIN 4.0 02/07/2023    ALT 12 02/07/2023    AST 15 02/07/2023    ALKPHOS 63 02/07/2023    BILITOT 0.5 02/07/2023     No results found for: GLUCOSE  No results found for: TSH  Lab Results   Component Value Date    CALCIUM 9.5 02/07/2023       Imaging:  CT Abdomen Pelvis With Contrast  Narrative: EXAMINATION:  CT ABDOMEN PELVIS WITH CONTRAST    CLINICAL HISTORY:  Epigastric pain;    TECHNIQUE:  Low dose axial images, sagittal and coronal reformations were obtained from the lung bases to the pubic symphysis following the IV administration of 100 mL of Omnipaque 350 .  Oral contrast was not administered.    COMPARISON:  10/15/2013    FINDINGS:  Abdomen:    - Lower thorax:    - Lung bases: No infiltrates and no nodules.    - Liver: No focal mass.    - Gallbladder: Status post cholecystectomy.    - Bile Ducts: No evidence of intra or extra hepatic biliary ductal dilation.    - Spleen: Spleen is mildly enlarged.  No focal abnormality.    - Kidneys: No mass or hydronephrosis.    - Adrenals: Unremarkable.    - Pancreas: No mass or peripancreatic fat stranding.    - Retroperitoneum:  No significant adenopathy.    - Vascular: No abdominal aortic aneurysm.    - Abdominal wall:  Small fat containing umbilical hernia.    Pelvis:    Urinary bladder is within normal limits.    The uterus is present.  Intrauterine device is noted.  No adnexal mass.    The appendix is within normal limits.    Mild fluid distention of the stomach with air-fluid level.    Bowel/Mesentery:    No evidence of bowel obstruction or inflammation.    Bones:  No acute osseous  abnormality and no suspicious lytic or blastic lesion.    Mildly enlarged left inguinal lymph node measuring 1.3 cm on axial 180.  Impression: 1. No acute abnormality.  2. Mild splenomegaly.  3. Small fat containing umbilical hernia.  4. Mildly enlarged left inguinal lymph node.  5. Intrauterine device is present.    Electronically signed by: Shane Garcia  Date:    02/07/2023  Time:    22:26      I have reviewed prior labs, imaging, and notes.      Assessment:  1. Diarrhea, unspecified type    2. Left upper quadrant abdominal pain        Plan:  Orders Placed This Encounter    Clostridium difficile EIA    Stool culture    Calprotectin, Stool    Giardia / Cryptosporidum, EIA    Pancreatic elastase, fecal    Rotavirus antigen, stool    Stool Exam-Ova,Cysts,Parasites    Tissue Transglutaminase, IgA    IgA    WBC, Stool    H. pylori Antibody, IgG    pantoprazole (PROTONIX) 40 MG tablet     Stool Studies  Labs  Continue Protonix 40mg daily  Continue Bentyl PRN  Start Fiber Supplement    If symptoms improve, consider stopping Protonix. If symptoms do not improve or worsen, consider EGD.   Mounjaro may also be contributing to symptoms. Consider stopping pending workup.     Plan of care discussed with patient who is in agreement and verbalized understanding.     I have explained the planned procedures to the patient.The risks, benefits and alternatives of the procedure were also explained in detail. Patient verbalized understanding, all questions were answered. The patient agrees to proceed as planned    Follow Up: 6 weeks          BILL Acosta,FNP-BC  [facility]    (Portions of this note were dictated using voice recognition software and may contain dictation related errors in spelling/grammar/syntax not found on text review)

## 2023-02-15 ENCOUNTER — LAB VISIT (OUTPATIENT)
Dept: LAB | Facility: HOSPITAL | Age: 28
End: 2023-02-15
Attending: NURSE PRACTITIONER
Payer: COMMERCIAL

## 2023-02-15 DIAGNOSIS — R19.7 DIARRHEA, UNSPECIFIED TYPE: ICD-10-CM

## 2023-02-15 LAB
H PYLORI IGG SERPL QL IA: NEGATIVE
IGA SERPL-MCNC: 347 MG/DL (ref 40–350)
RV AG STL QL IA.RAPID: NEGATIVE

## 2023-02-15 PROCEDURE — 83993 ASSAY FOR CALPROTECTIN FECAL: CPT | Performed by: NURSE PRACTITIONER

## 2023-02-15 PROCEDURE — 82653 EL-1 FECAL QUANTITATIVE: CPT | Performed by: NURSE PRACTITIONER

## 2023-02-15 PROCEDURE — 87209 SMEAR COMPLEX STAIN: CPT | Performed by: NURSE PRACTITIONER

## 2023-02-15 PROCEDURE — 89055 LEUKOCYTE ASSESSMENT FECAL: CPT | Performed by: NURSE PRACTITIONER

## 2023-02-15 PROCEDURE — 87045 FECES CULTURE AEROBIC BACT: CPT | Performed by: NURSE PRACTITIONER

## 2023-02-15 PROCEDURE — 87425 ROTAVIRUS AG IA: CPT | Performed by: NURSE PRACTITIONER

## 2023-02-15 PROCEDURE — 87427 SHIGA-LIKE TOXIN AG IA: CPT | Mod: 59 | Performed by: NURSE PRACTITIONER

## 2023-02-15 PROCEDURE — 87329 GIARDIA AG IA: CPT | Performed by: NURSE PRACTITIONER

## 2023-02-15 PROCEDURE — 87046 STOOL CULTR AEROBIC BACT EA: CPT | Mod: 59 | Performed by: NURSE PRACTITIONER

## 2023-02-16 LAB
CRYPTOSP AG STL QL IA: NEGATIVE
G LAMBLIA AG STL QL IA: NEGATIVE
WBC #/AREA STL HPF: NORMAL /[HPF]

## 2023-02-17 LAB
E COLI SXT1 STL QL IA: NEGATIVE
E COLI SXT2 STL QL IA: NEGATIVE
TTG IGA SER-ACNC: 1.2 U/ML

## 2023-02-20 LAB
BACTERIA STL CULT: NORMAL
BACTERIA STL CULT: NORMAL
ELASTASE 1, FECAL: >500 MCG/G

## 2023-02-21 LAB — CALPROTECTIN STL-MCNT: 64.3 MCG/G

## 2023-02-22 LAB — O+P STL MICRO: NORMAL

## 2023-02-23 ENCOUNTER — PATIENT MESSAGE (OUTPATIENT)
Dept: GASTROENTEROLOGY | Facility: CLINIC | Age: 28
End: 2023-02-23
Payer: COMMERCIAL

## 2023-03-03 ENCOUNTER — PATIENT MESSAGE (OUTPATIENT)
Dept: GASTROENTEROLOGY | Facility: CLINIC | Age: 28
End: 2023-03-03
Payer: COMMERCIAL

## 2023-03-28 ENCOUNTER — OFFICE VISIT (OUTPATIENT)
Dept: GASTROENTEROLOGY | Facility: CLINIC | Age: 28
End: 2023-03-28
Payer: COMMERCIAL

## 2023-03-28 VITALS — WEIGHT: 260.13 LBS | BODY MASS INDEX: 39.42 KG/M2 | HEIGHT: 68 IN

## 2023-03-28 DIAGNOSIS — R10.12 LEFT UPPER QUADRANT ABDOMINAL PAIN: Primary | ICD-10-CM

## 2023-03-28 PROCEDURE — 1159F MED LIST DOCD IN RCRD: CPT | Mod: CPTII,S$GLB,, | Performed by: NURSE PRACTITIONER

## 2023-03-28 PROCEDURE — 99999 PR PBB SHADOW E&M-EST. PATIENT-LVL III: ICD-10-PCS | Mod: PBBFAC,,, | Performed by: NURSE PRACTITIONER

## 2023-03-28 PROCEDURE — 99214 OFFICE O/P EST MOD 30 MIN: CPT | Mod: S$GLB,,, | Performed by: NURSE PRACTITIONER

## 2023-03-28 PROCEDURE — 3008F BODY MASS INDEX DOCD: CPT | Mod: CPTII,S$GLB,, | Performed by: NURSE PRACTITIONER

## 2023-03-28 PROCEDURE — 99214 PR OFFICE/OUTPT VISIT, EST, LEVL IV, 30-39 MIN: ICD-10-PCS | Mod: S$GLB,,, | Performed by: NURSE PRACTITIONER

## 2023-03-28 PROCEDURE — 99999 PR PBB SHADOW E&M-EST. PATIENT-LVL III: CPT | Mod: PBBFAC,,, | Performed by: NURSE PRACTITIONER

## 2023-03-28 PROCEDURE — 1159F PR MEDICATION LIST DOCUMENTED IN MEDICAL RECORD: ICD-10-PCS | Mod: CPTII,S$GLB,, | Performed by: NURSE PRACTITIONER

## 2023-03-28 PROCEDURE — 3008F PR BODY MASS INDEX (BMI) DOCUMENTED: ICD-10-PCS | Mod: CPTII,S$GLB,, | Performed by: NURSE PRACTITIONER

## 2023-03-28 NOTE — PROGRESS NOTES
GASTROENTEROLOGY CLINIC NOTE    Chief Complaint: The encounter diagnosis was Left upper quadrant abdominal pain.  Referring provider/PCP: Jhoan Patterson Jr, MD    Kathy Beltrán is a 27 y.o. female who is a new patient to me without a significant GI PMH. She is here today to establish care for abdominal pain and diarrhea and is accompanied by her mother.  These are new problems that began about two weeks ago.  Initially began with sulfur belching followed by diarrhea then vomiting.  Symptoms improved for a few days then returned which prompted her to seek care at the emergency room.  CT was obtained which was unrevealing for any acute processes.  Symptoms improved with GI cocktail. She was discharged with Protonix and Bentyl.  She's had three of these episodes over the last two weeks.  Abdominal pain is worse with episodes.  Vomitus consists of old food and bile. No hematemesis, pyrosis, or water brash.  Has h/o irregular bowel movements and will alternate between 2-3 per day followed by few days without bowel movements.  During episodes she has frequent watery bowel movements.  No melena or hematochezia.  She has tried Imodium, ibuprofen, TUMS. Currently taking Protonix 40mg daily and Bentyl PRN which is helping.  She started Mounjaro in 9/2022 and has been on same dose since 11/2022.       Abdominal Pain  This is a new problem. The current episode started 1 to 4 weeks ago. The onset quality is sudden. The problem has been waxing and waning. The pain is located in the LUQ. The quality of the pain is cramping and sharp (with episodes). The abdominal pain radiates to the left flank. Associated symptoms include belching. Pertinent negatives include no constipation, diarrhea, dysuria, headaches, hematochezia, melena, myalgias, nausea, vomiting or weight loss. Associated symptoms comments: Decreased appetite,. The pain is aggravated by eating.     Interval Note 3/28/2023  Kathy Beltrán who is known to me presents  to clinic for follow up regarding abdominal pain, diarrhea, and belching.  Following last appointment, stool studies were obtained and protonix 40mg daily was continued.  Stool studies were within range except for mildly elevated calprotectin (64). Celiac labs were negative.  She has done well since previous office visit.  No longer having nausea, diarrhea, belching, or abdominal pain.  Is having some constipation but taking stool softener. No melena, hematochezia, nocturnal symptoms.     NSAIDs: No  Anticoagulation or Antiplatelet: No      History of H.pylori: no  H.pylori Treatment:  Prior Upper Endoscopy: no  Prior Colonoscopy: no  Family h/o Colon Cancer: No  Family h/o Crohn's Disease or Ulcerative Colitis: No  Family h/o Celiac Sprue: No  Abdominal Surgeries: Cholecystectomy, C section    Review of Systems   Constitutional:  Negative for weight loss.   HENT:  Negative for sore throat.    Eyes:  Negative for blurred vision.   Respiratory:  Negative for cough.    Cardiovascular:  Negative for chest pain.   Gastrointestinal:  Negative for abdominal pain, blood in stool, constipation, diarrhea, heartburn, hematochezia, melena, nausea and vomiting.   Genitourinary:  Negative for dysuria.   Musculoskeletal:  Negative for myalgias.   Skin:  Negative for rash.   Neurological:  Negative for headaches.   Endo/Heme/Allergies:  Negative for environmental allergies.   Psychiatric/Behavioral:  Negative for suicidal ideas. The patient is not nervous/anxious.      Past Medical History: has no past medical history on file.    Past Surgical History: has a past surgical history that includes Breast biopsy; Cholecystectomy; and Cholesteatoma excision.    Family History:family history is not on file.    Allergies:   Review of patient's allergies indicates:   Allergen Reactions    Pcn [penicillins]     Sulfa (sulfonamide antibiotics)        Social History: reports that she has never smoked. She does not have any smokeless tobacco  "history on file. She reports that she does not drink alcohol.    Home medications:   Current Outpatient Medications on File Prior to Visit   Medication Sig Dispense Refill    ondansetron (ZOFRAN-ODT) 4 MG TbDL Take 1 tablet (4 mg total) by mouth every 8 (eight) hours as needed (nausea). 20 tablet 0    pantoprazole (PROTONIX) 40 MG tablet Take 1 tablet (40 mg total) by mouth once daily. 30 tablet 0    tirzepatide (MOUNJARO) 7.5 mg/0.5 mL PnIj Inject 7.5 mg into the skin every 7 days.      vilazodone (VIIBRYD) 20 mg Tab Take 1 tablet (20 mg total) by mouth once daily with food. 30 tablet 0    vilazodone (VIIBRYD) 20 mg Tab Take 1 tablet (20 mg total) by mouth once daily. 90 tablet 2    [DISCONTINUED] dicyclomine (BENTYL) 20 mg tablet Take 1 tablet (20 mg total) by mouth 4 (four) times daily as needed (CRAMPS). 15 tablet 0    [DISCONTINUED] pantoprazole (PROTONIX) 40 MG tablet Take 1 tablet (40 mg total) by mouth once daily. 30 tablet 0     No current facility-administered medications on file prior to visit.       Vital signs:  Ht 5' 8" (1.727 m)   Wt 118 kg (260 lb 2.3 oz)   BMI 39.55 kg/m²     Physical Exam  Vitals reviewed.   Constitutional:       General: She is not in acute distress.     Appearance: Normal appearance. She is not ill-appearing.   HENT:      Head: Normocephalic.   Cardiovascular:      Rate and Rhythm: Normal rate and regular rhythm.      Heart sounds: Normal heart sounds. No murmur heard.  Pulmonary:      Effort: Pulmonary effort is normal. No respiratory distress.      Breath sounds: Normal breath sounds.   Chest:      Chest wall: No tenderness.   Abdominal:      General: Bowel sounds are normal. There is no distension.      Palpations: Abdomen is soft.      Tenderness: There is no abdominal tenderness. Negative signs include Amaro's sign.      Hernia: No hernia is present.   Skin:     General: Skin is warm.   Neurological:      Mental Status: She is alert and oriented to person, place, and " time.   Psychiatric:         Mood and Affect: Mood normal.         Behavior: Behavior normal.       Routine labs:  Lab Results   Component Value Date    WBC 9.21 02/07/2023    HGB 14.4 02/07/2023    HCT 44.6 02/07/2023    MCV 87 02/07/2023     02/07/2023     No results found for: INR  No results found for: IRON, FERRITIN, TIBC, FESATURATED  Lab Results   Component Value Date     02/07/2023    K 4.9 02/07/2023     02/07/2023    CO2 22 (L) 02/07/2023    BUN 12 02/07/2023    CREATININE 0.8 02/07/2023     Lab Results   Component Value Date    ALBUMIN 4.0 02/07/2023    ALT 12 02/07/2023    AST 15 02/07/2023    ALKPHOS 63 02/07/2023    BILITOT 0.5 02/07/2023     No results found for: GLUCOSE  No results found for: TSH  Lab Results   Component Value Date    CALCIUM 9.5 02/07/2023       Imaging:  CT Abdomen Pelvis With Contrast  Narrative: EXAMINATION:  CT ABDOMEN PELVIS WITH CONTRAST    CLINICAL HISTORY:  Epigastric pain;    TECHNIQUE:  Low dose axial images, sagittal and coronal reformations were obtained from the lung bases to the pubic symphysis following the IV administration of 100 mL of Omnipaque 350 .  Oral contrast was not administered.    COMPARISON:  10/15/2013    FINDINGS:  Abdomen:    - Lower thorax:    - Lung bases: No infiltrates and no nodules.    - Liver: No focal mass.    - Gallbladder: Status post cholecystectomy.    - Bile Ducts: No evidence of intra or extra hepatic biliary ductal dilation.    - Spleen: Spleen is mildly enlarged.  No focal abnormality.    - Kidneys: No mass or hydronephrosis.    - Adrenals: Unremarkable.    - Pancreas: No mass or peripancreatic fat stranding.    - Retroperitoneum:  No significant adenopathy.    - Vascular: No abdominal aortic aneurysm.    - Abdominal wall:  Small fat containing umbilical hernia.    Pelvis:    Urinary bladder is within normal limits.    The uterus is present.  Intrauterine device is noted.  No adnexal mass.    The appendix is within  normal limits.    Mild fluid distention of the stomach with air-fluid level.    Bowel/Mesentery:    No evidence of bowel obstruction or inflammation.    Bones:  No acute osseous abnormality and no suspicious lytic or blastic lesion.    Mildly enlarged left inguinal lymph node measuring 1.3 cm on axial 180.  Impression: 1. No acute abnormality.  2. Mild splenomegaly.  3. Small fat containing umbilical hernia.  4. Mildly enlarged left inguinal lymph node.  5. Intrauterine device is present.    Electronically signed by: Shane Garcia  Date:    02/07/2023  Time:    22:26      I have reviewed prior labs, imaging, and notes.      Assessment:  1. Left upper quadrant abdominal pain          Plan:     Continue pantoprazole for one more month then discontinue.    If symptoms return consider EGD.      Plan of care discussed with patient who is in agreement and verbalized understanding.     I have explained the planned procedures to the patient.The risks, benefits and alternatives of the procedure were also explained in detail. Patient verbalized understanding, all questions were answered. The patient agrees to proceed as planned    Follow Up: As Needed          BILL Acosta,FNP-BC  [facility]    (Portions of this note were dictated using voice recognition software and may contain dictation related errors in spelling/grammar/syntax not found on text review)

## 2023-03-28 NOTE — PATIENT INSTRUCTIONS
Continue pantoprazole daily for one more month then you can discontinue.   Take daily fiber supplement and continue stool softener.   You can take a dose of mirilax if needed for bowel movements.

## 2023-03-30 ENCOUNTER — PATIENT MESSAGE (OUTPATIENT)
Dept: GASTROENTEROLOGY | Facility: CLINIC | Age: 28
End: 2023-03-30
Payer: COMMERCIAL

## 2023-04-05 ENCOUNTER — OFFICE VISIT (OUTPATIENT)
Dept: INTERNAL MEDICINE | Facility: CLINIC | Age: 28
End: 2023-04-05
Payer: COMMERCIAL

## 2023-04-05 VITALS
DIASTOLIC BLOOD PRESSURE: 80 MMHG | HEIGHT: 68 IN | HEART RATE: 75 BPM | OXYGEN SATURATION: 100 % | SYSTOLIC BLOOD PRESSURE: 116 MMHG | WEIGHT: 259.06 LBS | BODY MASS INDEX: 39.26 KG/M2 | RESPIRATION RATE: 16 BRPM

## 2023-04-05 DIAGNOSIS — K29.70 GASTRITIS WITHOUT BLEEDING, UNSPECIFIED CHRONICITY, UNSPECIFIED GASTRITIS TYPE: ICD-10-CM

## 2023-04-05 DIAGNOSIS — F32.5 MAJOR DEPRESSIVE DISORDER WITH SINGLE EPISODE, IN FULL REMISSION: ICD-10-CM

## 2023-04-05 DIAGNOSIS — Z76.89 ENCOUNTER TO ESTABLISH CARE: Primary | ICD-10-CM

## 2023-04-05 DIAGNOSIS — E66.09 CLASS 2 OBESITY DUE TO EXCESS CALORIES WITHOUT SERIOUS COMORBIDITY WITH BODY MASS INDEX (BMI) OF 39.0 TO 39.9 IN ADULT: ICD-10-CM

## 2023-04-05 PROBLEM — E66.812 CLASS 2 OBESITY DUE TO EXCESS CALORIES WITHOUT SERIOUS COMORBIDITY WITH BODY MASS INDEX (BMI) OF 39.0 TO 39.9 IN ADULT: Status: ACTIVE | Noted: 2023-04-05

## 2023-04-05 PROCEDURE — 3079F PR MOST RECENT DIASTOLIC BLOOD PRESSURE 80-89 MM HG: ICD-10-PCS | Mod: CPTII,S$GLB,, | Performed by: INTERNAL MEDICINE

## 2023-04-05 PROCEDURE — 1160F PR REVIEW ALL MEDS BY PRESCRIBER/CLIN PHARMACIST DOCUMENTED: ICD-10-PCS | Mod: CPTII,S$GLB,, | Performed by: INTERNAL MEDICINE

## 2023-04-05 PROCEDURE — 3074F PR MOST RECENT SYSTOLIC BLOOD PRESSURE < 130 MM HG: ICD-10-PCS | Mod: CPTII,S$GLB,, | Performed by: INTERNAL MEDICINE

## 2023-04-05 PROCEDURE — 3008F BODY MASS INDEX DOCD: CPT | Mod: CPTII,S$GLB,, | Performed by: INTERNAL MEDICINE

## 2023-04-05 PROCEDURE — 99203 PR OFFICE/OUTPT VISIT, NEW, LEVL III, 30-44 MIN: ICD-10-PCS | Mod: S$GLB,,, | Performed by: INTERNAL MEDICINE

## 2023-04-05 PROCEDURE — 1159F PR MEDICATION LIST DOCUMENTED IN MEDICAL RECORD: ICD-10-PCS | Mod: CPTII,S$GLB,, | Performed by: INTERNAL MEDICINE

## 2023-04-05 PROCEDURE — 3079F DIAST BP 80-89 MM HG: CPT | Mod: CPTII,S$GLB,, | Performed by: INTERNAL MEDICINE

## 2023-04-05 PROCEDURE — 99999 PR PBB SHADOW E&M-EST. PATIENT-LVL IV: CPT | Mod: PBBFAC,,, | Performed by: INTERNAL MEDICINE

## 2023-04-05 PROCEDURE — 99203 OFFICE O/P NEW LOW 30 MIN: CPT | Mod: S$GLB,,, | Performed by: INTERNAL MEDICINE

## 2023-04-05 PROCEDURE — 1159F MED LIST DOCD IN RCRD: CPT | Mod: CPTII,S$GLB,, | Performed by: INTERNAL MEDICINE

## 2023-04-05 PROCEDURE — 3008F PR BODY MASS INDEX (BMI) DOCUMENTED: ICD-10-PCS | Mod: CPTII,S$GLB,, | Performed by: INTERNAL MEDICINE

## 2023-04-05 PROCEDURE — 1160F RVW MEDS BY RX/DR IN RCRD: CPT | Mod: CPTII,S$GLB,, | Performed by: INTERNAL MEDICINE

## 2023-04-05 PROCEDURE — 3074F SYST BP LT 130 MM HG: CPT | Mod: CPTII,S$GLB,, | Performed by: INTERNAL MEDICINE

## 2023-04-05 PROCEDURE — 99999 PR PBB SHADOW E&M-EST. PATIENT-LVL IV: ICD-10-PCS | Mod: PBBFAC,,, | Performed by: INTERNAL MEDICINE

## 2023-04-05 RX ORDER — ARIPIPRAZOLE 2 MG/1
2 TABLET ORAL
COMMUNITY
Start: 2023-03-16 | End: 2023-07-07

## 2023-04-05 RX ORDER — HYDROXYZINE PAMOATE 25 MG/1
25 CAPSULE ORAL
COMMUNITY
Start: 2023-03-16 | End: 2023-07-07

## 2023-04-05 NOTE — PROGRESS NOTES
Subjective:       Patient ID: Kathy Beltrán is a 27 y.o. female.    Chief Complaint: Establish Care      HPI:  Patient is new to clinic and presents to establish care. She has obesity, MDD and possible gastritis. No new labs prior to today's visit.     She is concerned about having lipidemia.  She is on Mounjaro and has lost 50 pounds in 6 months.   Has been taking 7.5mg weekly since Nov 2022. Denies medication side effects.   She really feels this is working well for her. Has struggled with obesity most of her adult life and never had this much success.   Denies personal or FH of thyroid cancer, pancreatic cancer, pancreatitis.     Feb 2023 started on protonix. Saw GI and sx are improved and will wean off in the coming weeks and monitor sx. GLP-1 could be contributing to her GI sx as well and she is aware of this. .     Depresison: on viibryd and abilify. Follows with psychiatry. Just started on abilify and follows up with psych soon.     PAP-establishing with Dr. Kelly    Tobacco use: denies use  EtOH use: juaquin  History reviewed. No pertinent past medical history.    Family History   Problem Relation Age of Onset    Hypertension Mother     Heart disease Mother     Hypertension Father     Heart disease Father     Hypertension Maternal Grandmother     Heart disease Maternal Grandmother     Thyroid disease Maternal Grandmother     Hypertension Maternal Grandfather     Heart disease Maternal Grandfather        Social History     Socioeconomic History    Marital status:    Tobacco Use    Smoking status: Never   Substance and Sexual Activity    Alcohol use: No    Drug use: Never       Review of Systems   Constitutional:  Negative for activity change, fatigue, fever and unexpected weight change.   HENT:  Negative for congestion, ear pain, hearing loss, rhinorrhea, sore throat and tinnitus.    Eyes:  Negative for pain, redness and visual disturbance.   Respiratory:  Negative for cough, shortness of breath and  wheezing.    Cardiovascular:  Negative for chest pain, palpitations and leg swelling.   Gastrointestinal:  Negative for abdominal pain, blood in stool, constipation, diarrhea, nausea and vomiting.   Genitourinary:  Negative for dysuria, frequency, pelvic pain and urgency.   Musculoskeletal:  Negative for back pain, joint swelling and neck pain.   Skin:  Negative for color change, rash and wound.   Neurological:  Negative for dizziness, tremors, weakness, light-headedness and headaches.       Objective:      Physical Exam  Vitals reviewed.   Constitutional:       General: She is not in acute distress.     Appearance: She is well-developed. She is obese.   HENT:      Head: Normocephalic and atraumatic.      Right Ear: External ear normal.      Left Ear: External ear normal.      Nose: Nose normal.   Eyes:      General:         Right eye: No discharge.         Left eye: No discharge.      Extraocular Movements: Extraocular movements intact.      Conjunctiva/sclera: Conjunctivae normal.      Pupils: Pupils are equal, round, and reactive to light.   Neck:      Thyroid: No thyromegaly.   Cardiovascular:      Rate and Rhythm: Normal rate and regular rhythm.      Heart sounds: No murmur heard.  Pulmonary:      Effort: Pulmonary effort is normal. No respiratory distress.      Breath sounds: Normal breath sounds. No wheezing.   Abdominal:      General: Bowel sounds are normal. There is no distension.      Palpations: Abdomen is soft.      Tenderness: There is no abdominal tenderness.   Skin:     General: Skin is warm and dry.   Neurological:      Mental Status: She is alert and oriented to person, place, and time.      Cranial Nerves: No cranial nerve deficit.   Psychiatric:         Behavior: Behavior normal.         Thought Content: Thought content normal.       Assessment:       1. Encounter to establish care    2. Class 2 obesity due to excess calories without serious comorbidity with body mass index (BMI) of 39.0 to 39.9  in adult    3. Major depressive disorder with single episode, in full remission    4. Gastritis without bleeding, unspecified chronicity, unspecified gastritis type        Plan:       1. Encounter to establish care  Meds reconciled  Problem list reviewed and updated  PMH, PSH, SH and FH reviewed    2. Class 2 obesity due to excess calories without serious comorbidity with body mass index (BMI) of 39.0 to 39.9 in adult  Diet, exercise, weight loss discussed  She has reportely lost 50 pounds in 6 months on GLP-1  She would have started with morbid obesity and BMI > 40 based on this history; BMI now 39. Appropriate to continue  Risks and benefits discussed at length  Update labs with next visit    -     CBC Auto Differential; Future; Expected date: 07/04/2023  -     Comprehensive Metabolic Panel; Future; Expected date: 07/04/2023  -     Hemoglobin A1C; Future; Expected date: 07/04/2023  -     Lipid Panel; Future; Expected date: 07/04/2023  -     TSH; Future; Expected date: 07/04/2023  -     T4, Free; Future; Expected date: 07/04/2023  -     tirzepatide 10 mg/0.5 mL PnIj; Inject 10 mg into the skin every 7 days.  Dispense: 12 pen; Refill: 1    3. Major depressive disorder with single episode, in full remission  Chronic stable  Cont meds same dose per psych recs  Cont psych follow up as planned  -     TSH; Future; Expected date: 07/04/2023  -     T4, Free; Future; Expected date: 07/04/2023    4. Gastritis without bleeding, unspecified chronicity, unspecified gastritis type  Chronic controlled  Cont PPI per GI recs  Agree with trial off in coming weels as sx resolved and monitor  GLP-1 could be contributing  Doesn't seem to be consistent with GLP-1 induced pancreatitis  -     Comprehensive Metabolic Panel; Future; Expected date: 07/04/2023       RTC 3m onths with labs, follow up obesity and PRN

## 2023-04-05 NOTE — LETTER
AUTHORIZATION FOR RELEASE OF   CONFIDENTIAL INFORMATION    Dear Dr. Beltrán,    We are seeing Kathy Beltrán, date of birth 1995, in the clinic at No PCP found. No primary care provider on file. is the patient's PCP. Kathy Beltrán has an outstanding lab/procedure at the time we reviewed her chart. In order to help keep her health information updated, she has authorized us to request the following medical record(s):        (  )  MAMMOGRAM                                      (  )  COLONOSCOPY      ( X )  PAP SMEAR                                          (  )  OUTSIDE LAB RESULTS     (  )  DEXA SCAN                                          (  )  EYE EXAM            (  )  FOOT EXAM                                          (  )  ENTIRE RECORD     (  )  OUTSIDE IMMUNIZATIONS                 (  )  _______________         Please fax records to [facility], No primary care provider on file., [phone]     If you have any questions, please contact Maddison Echevarria MA at [phone].           Patient Name: Kathy Beltrán  : 1995  Patient Phone #: [phone]

## 2023-04-14 ENCOUNTER — OFFICE VISIT (OUTPATIENT)
Dept: OBSTETRICS AND GYNECOLOGY | Facility: CLINIC | Age: 28
End: 2023-04-14
Attending: OBSTETRICS & GYNECOLOGY
Payer: COMMERCIAL

## 2023-04-14 VITALS
HEART RATE: 79 BPM | RESPIRATION RATE: 18 BRPM | HEIGHT: 68 IN | DIASTOLIC BLOOD PRESSURE: 78 MMHG | BODY MASS INDEX: 44.41 KG/M2 | OXYGEN SATURATION: 98 % | WEIGHT: 293 LBS | SYSTOLIC BLOOD PRESSURE: 116 MMHG

## 2023-04-14 DIAGNOSIS — Z01.419 WELL WOMAN EXAM WITH ROUTINE GYNECOLOGICAL EXAM: Primary | ICD-10-CM

## 2023-04-14 DIAGNOSIS — Z12.39 ENCOUNTER FOR BREAST CANCER SCREENING USING NON-MAMMOGRAM MODALITY: ICD-10-CM

## 2023-04-14 DIAGNOSIS — Z30.431 IUD CHECK UP: ICD-10-CM

## 2023-04-14 DIAGNOSIS — Z12.4 CERVICAL CANCER SCREENING: ICD-10-CM

## 2023-04-14 PROCEDURE — 3078F PR MOST RECENT DIASTOLIC BLOOD PRESSURE < 80 MM HG: ICD-10-PCS | Mod: CPTII,S$GLB,, | Performed by: OBSTETRICS & GYNECOLOGY

## 2023-04-14 PROCEDURE — 99385 PR PREVENTIVE VISIT,NEW,18-39: ICD-10-PCS | Mod: S$GLB,,, | Performed by: OBSTETRICS & GYNECOLOGY

## 2023-04-14 PROCEDURE — 3008F PR BODY MASS INDEX (BMI) DOCUMENTED: ICD-10-PCS | Mod: CPTII,S$GLB,, | Performed by: OBSTETRICS & GYNECOLOGY

## 2023-04-14 PROCEDURE — 99999 PR PBB SHADOW E&M-EST. PATIENT-LVL III: ICD-10-PCS | Mod: PBBFAC,,, | Performed by: OBSTETRICS & GYNECOLOGY

## 2023-04-14 PROCEDURE — 1159F MED LIST DOCD IN RCRD: CPT | Mod: CPTII,S$GLB,, | Performed by: OBSTETRICS & GYNECOLOGY

## 2023-04-14 PROCEDURE — 88175 CYTOPATH C/V AUTO FLUID REDO: CPT | Performed by: OBSTETRICS & GYNECOLOGY

## 2023-04-14 PROCEDURE — 1159F PR MEDICATION LIST DOCUMENTED IN MEDICAL RECORD: ICD-10-PCS | Mod: CPTII,S$GLB,, | Performed by: OBSTETRICS & GYNECOLOGY

## 2023-04-14 PROCEDURE — 3074F SYST BP LT 130 MM HG: CPT | Mod: CPTII,S$GLB,, | Performed by: OBSTETRICS & GYNECOLOGY

## 2023-04-14 PROCEDURE — 99999 PR PBB SHADOW E&M-EST. PATIENT-LVL III: CPT | Mod: PBBFAC,,, | Performed by: OBSTETRICS & GYNECOLOGY

## 2023-04-14 PROCEDURE — 3078F DIAST BP <80 MM HG: CPT | Mod: CPTII,S$GLB,, | Performed by: OBSTETRICS & GYNECOLOGY

## 2023-04-14 PROCEDURE — 99385 PREV VISIT NEW AGE 18-39: CPT | Mod: S$GLB,,, | Performed by: OBSTETRICS & GYNECOLOGY

## 2023-04-14 PROCEDURE — 3074F PR MOST RECENT SYSTOLIC BLOOD PRESSURE < 130 MM HG: ICD-10-PCS | Mod: CPTII,S$GLB,, | Performed by: OBSTETRICS & GYNECOLOGY

## 2023-04-14 PROCEDURE — 3008F BODY MASS INDEX DOCD: CPT | Mod: CPTII,S$GLB,, | Performed by: OBSTETRICS & GYNECOLOGY

## 2023-04-14 NOTE — PROGRESS NOTES
Subjective:    Patient ID: Kathy Beltrán is a 28 y.o. y.o. female.     Chief Complaint: Annual Well Woman Exam     History of Present Illness:  Kathy presents today for Annual Well Woman exam. She describes her menses as  absent with IUD in place .She denies pelvic pain.  She denies breast tenderness, masses, nipple discharge. She denies difficulty with urination or bowel movements. She is sexually active. Contraception is by IUD.    The following portions of the patient's history were reviewed and updated as appropriate: allergies, current medications, past family history, past medical history, past social history, past surgical history and problem list.      Menstrual History:   Patient's last menstrual period was 2021 (exact date)..     OB History          1    Para   1    Term   1            AB        Living   1         SAB        IAB        Ectopic        Multiple        Live Births   1                 The following portions of the patient's history were reviewed and updated as appropriate: allergies, current medications, past family history, past medical history, past social history, past surgical history, and problem list.        ROS:     Review of Systems   Constitutional:  Negative for activity change, appetite change, chills, diaphoresis, fatigue, fever and unexpected weight change.   HENT:  Negative for mouth sores and tinnitus.    Eyes:  Negative for discharge and visual disturbance.   Respiratory:  Negative for cough, shortness of breath and wheezing.    Cardiovascular:  Negative for chest pain, palpitations and leg swelling.   Gastrointestinal:  Negative for abdominal pain, bloating, blood in stool, constipation, diarrhea, nausea and vomiting.   Endocrine: Negative for diabetes, hair loss, hot flashes, hyperthyroidism and hypothyroidism.   Genitourinary:  Negative for dysuria, flank pain, frequency, genital sores, hematuria, urgency, vaginal bleeding, vaginal discharge,  "vaginal pain, postcoital bleeding and vaginal odor.   Musculoskeletal:  Negative for back pain, joint swelling and myalgias.   Integumentary:  Negative for rash, acne, breast mass, nipple discharge and breast skin changes.   Neurological:  Negative for seizures, syncope, numbness and headaches.   Hematological:  Negative for adenopathy. Does not bruise/bleed easily.   Psychiatric/Behavioral:  Negative for depression and sleep disturbance. The patient is not nervous/anxious.    Breast: Negative for mass, mastodynia, nipple discharge and skin changes    Objective:    Vital Signs:  Vitals:    04/14/23 1118   BP: 116/78   Pulse: 79   Resp: 18   SpO2: 98%   Weight: (!) 163.4 kg (360 lb 4.8 oz)   Height: 5' 8" (1.727 m)         Physical Exam:  General:  alert, cooperative, appears stated age   Skin:  Skin color, texture, turgor normal. No rashes or lesions   HEENT:  conjunctivae/corneas clear. PERRL.   Neck: supple, trachea midline, no adenopathy or thyromegally   Respiratory:  clear to auscultation bilaterally   Heart:  regular rate and rhythm, S1, S2 normal, no murmur, click, rub or gallop   Breasts:  no discharge, erythema, or tenderness   Abdomen:  normal findings: bowel sounds normal, no masses palpable, no organomegaly and soft, non-tender   Pelvis: External genitalia: normal general appearance  Urinary system: urethral meatus normal, bladder nontender  Vaginal: normal mucosa without prolapse or lesions  Cervix: normal appearance, IUD string visualized  Uterus: normal size, shape, position  Adnexa: normal size, nontender bilaterally   Extremities: Normal ROM; no edema, no cyanosis   Neurologial: Normal strength and tone. No focal numbness or weakness. Reflexes 2+ and equal.   Psychiatric: normal mood, speech, dress, and thought processes         Assessment:       Healthy female exam.     1. Well woman exam with routine gynecological exam    2. Encounter for breast cancer screening using non-mammogram modality    3. " Cervical cancer screening          Plan:       Thin prep Pap smear.  Continue IUD; due for removal next year  RTC in 1 year or prn      COUNSELING:  Kathy was counseled on STD pevention, use and side-effects of various contraceptive measures, A.C.O.G. Pap guidelines and recommendations for yearly pelvic exams in addition to recommendations for monthly self breast exams; to see her PCP for other health maintenance.

## 2023-04-25 ENCOUNTER — PATIENT MESSAGE (OUTPATIENT)
Dept: INTERNAL MEDICINE | Facility: CLINIC | Age: 28
End: 2023-04-25
Payer: COMMERCIAL

## 2023-04-25 LAB
FINAL PATHOLOGIC DIAGNOSIS: NORMAL
Lab: NORMAL

## 2023-05-04 ENCOUNTER — PATIENT MESSAGE (OUTPATIENT)
Dept: OBSTETRICS AND GYNECOLOGY | Facility: CLINIC | Age: 28
End: 2023-05-04
Payer: COMMERCIAL

## 2023-06-08 ENCOUNTER — OFFICE VISIT (OUTPATIENT)
Dept: INTERNAL MEDICINE | Facility: CLINIC | Age: 28
End: 2023-06-08
Payer: COMMERCIAL

## 2023-06-08 VITALS
WEIGHT: 255.5 LBS | BODY MASS INDEX: 38.72 KG/M2 | DIASTOLIC BLOOD PRESSURE: 88 MMHG | SYSTOLIC BLOOD PRESSURE: 126 MMHG | HEART RATE: 75 BPM | RESPIRATION RATE: 20 BRPM | HEIGHT: 68 IN

## 2023-06-08 DIAGNOSIS — E66.09 CLASS 2 OBESITY DUE TO EXCESS CALORIES WITHOUT SERIOUS COMORBIDITY WITH BODY MASS INDEX (BMI) OF 39.0 TO 39.9 IN ADULT: ICD-10-CM

## 2023-06-08 DIAGNOSIS — H10.9 CONJUNCTIVITIS, UNSPECIFIED CONJUNCTIVITIS TYPE, UNSPECIFIED LATERALITY: Primary | ICD-10-CM

## 2023-06-08 DIAGNOSIS — F32.5 MAJOR DEPRESSIVE DISORDER WITH SINGLE EPISODE, IN FULL REMISSION: ICD-10-CM

## 2023-06-08 PROCEDURE — 3079F DIAST BP 80-89 MM HG: CPT | Mod: CPTII,S$GLB,, | Performed by: NURSE PRACTITIONER

## 2023-06-08 PROCEDURE — 99999 PR PBB SHADOW E&M-EST. PATIENT-LVL III: CPT | Mod: PBBFAC,,, | Performed by: NURSE PRACTITIONER

## 2023-06-08 PROCEDURE — 3079F PR MOST RECENT DIASTOLIC BLOOD PRESSURE 80-89 MM HG: ICD-10-PCS | Mod: CPTII,S$GLB,, | Performed by: NURSE PRACTITIONER

## 2023-06-08 PROCEDURE — 3074F SYST BP LT 130 MM HG: CPT | Mod: CPTII,S$GLB,, | Performed by: NURSE PRACTITIONER

## 2023-06-08 PROCEDURE — 1159F MED LIST DOCD IN RCRD: CPT | Mod: CPTII,S$GLB,, | Performed by: NURSE PRACTITIONER

## 2023-06-08 PROCEDURE — 99213 OFFICE O/P EST LOW 20 MIN: CPT | Mod: S$GLB,,, | Performed by: NURSE PRACTITIONER

## 2023-06-08 PROCEDURE — 3074F PR MOST RECENT SYSTOLIC BLOOD PRESSURE < 130 MM HG: ICD-10-PCS | Mod: CPTII,S$GLB,, | Performed by: NURSE PRACTITIONER

## 2023-06-08 PROCEDURE — 1160F RVW MEDS BY RX/DR IN RCRD: CPT | Mod: CPTII,S$GLB,, | Performed by: NURSE PRACTITIONER

## 2023-06-08 PROCEDURE — 99999 PR PBB SHADOW E&M-EST. PATIENT-LVL III: ICD-10-PCS | Mod: PBBFAC,,, | Performed by: NURSE PRACTITIONER

## 2023-06-08 PROCEDURE — 99213 PR OFFICE/OUTPT VISIT, EST, LEVL III, 20-29 MIN: ICD-10-PCS | Mod: S$GLB,,, | Performed by: NURSE PRACTITIONER

## 2023-06-08 PROCEDURE — 3008F PR BODY MASS INDEX (BMI) DOCUMENTED: ICD-10-PCS | Mod: CPTII,S$GLB,, | Performed by: NURSE PRACTITIONER

## 2023-06-08 PROCEDURE — 1160F PR REVIEW ALL MEDS BY PRESCRIBER/CLIN PHARMACIST DOCUMENTED: ICD-10-PCS | Mod: CPTII,S$GLB,, | Performed by: NURSE PRACTITIONER

## 2023-06-08 PROCEDURE — 1159F PR MEDICATION LIST DOCUMENTED IN MEDICAL RECORD: ICD-10-PCS | Mod: CPTII,S$GLB,, | Performed by: NURSE PRACTITIONER

## 2023-06-08 PROCEDURE — 3008F BODY MASS INDEX DOCD: CPT | Mod: CPTII,S$GLB,, | Performed by: NURSE PRACTITIONER

## 2023-06-08 RX ORDER — ERYTHROMYCIN 5 MG/G
OINTMENT OPHTHALMIC 3 TIMES DAILY
Qty: 3.5 G | Refills: 0 | Status: SHIPPED | OUTPATIENT
Start: 2023-06-08 | End: 2023-07-07

## 2023-06-08 NOTE — PROGRESS NOTES
Subjective:       Patient ID: Kathy Beltrán is a 28 y.o. female.    Chief Complaint: Eye Pain and Conjunctivitis    HPI: Pt presents to clinic today known o me with c/o eye was irritated yesterday but this am was crusted shut and swollen She also report that it is red and stared yesterday. No Sore thorat or fever. No cough. Constant PND. Started cipro drops yesteray every 2 hiours does not seem to have helped. Allergy to PCN and sulfa   Review of Systems   Constitutional:  Negative for chills, fatigue, fever and unexpected weight change.   HENT:  Negative for congestion, ear pain, sore throat and trouble swallowing.    Eyes:  Positive for pain, discharge and redness. Negative for visual disturbance.   Respiratory:  Negative for cough, chest tightness and shortness of breath.    Cardiovascular:  Negative for chest pain, palpitations and leg swelling.   Gastrointestinal:  Negative for abdominal distention, abdominal pain, constipation, diarrhea and vomiting.   Genitourinary:  Negative for difficulty urinating, dysuria, flank pain, frequency and hematuria.   Musculoskeletal:  Negative for back pain, gait problem, joint swelling, neck pain and neck stiffness.   Skin:  Negative for rash and wound.   Neurological:  Negative for dizziness, seizures, speech difficulty, light-headedness and headaches.     Objective:      Physical Exam  Vitals and nursing note reviewed.   Constitutional:       Appearance: She is well-developed. She is obese.   HENT:      Head: Normocephalic and atraumatic.      Right Ear: External ear normal.      Left Ear: External ear normal.      Nose: Nose normal.   Eyes:      General:         Left eye: Discharge present.     Conjunctiva/sclera: Conjunctivae normal.      Pupils: Pupils are equal, round, and reactive to light.      Comments: Left eye conjuctiva and sclera redness.    Cardiovascular:      Rate and Rhythm: Normal rate and regular rhythm.      Heart sounds: Normal heart sounds. No murmur  heard.  Pulmonary:      Effort: Pulmonary effort is normal. No respiratory distress.      Breath sounds: Normal breath sounds. No wheezing or rales.   Abdominal:      General: Bowel sounds are normal. There is no distension.      Palpations: Abdomen is soft.   Musculoskeletal:         General: Normal range of motion.      Cervical back: Normal range of motion and neck supple.   Skin:     General: Skin is warm and dry.   Neurological:      Mental Status: She is alert and oriented to person, place, and time.   Psychiatric:         Behavior: Behavior normal.         Thought Content: Thought content normal.         Judgment: Judgment normal.       Assessment:       1. Conjunctivitis, unspecified conjunctivitis type, unspecified laterality    2. Major depressive disorder with single episode, in full remission    3. Class 2 obesity due to excess calories without serious comorbidity with body mass index (BMI) of 39.0 to 39.9 in adult        Plan:     Problem List Items Addressed This Visit       Class 2 obesity due to excess calories without serious comorbidity with body mass index (BMI) of 39.0 to 39.9 in adult    Major depressive disorder with single episode, in full remission     Other Visit Diagnoses       Conjunctivitis, unspecified conjunctivitis type, unspecified laterality    -  Primary    Relevant Medications    erythromycin (ROMYCIN) ophthalmic ointment          Stop cipro drops has used 5 doses without improvement-- allergy to pcn and sulfa- erythromycin oint for now     Interested in adding contrave to her weight loss plan. She is not seeing the weight loss like she was. Will discuss with Dr Lora she has f./u with labs next month there- will need the naltrexone/wellbutrin called in separately for affordability .

## 2023-06-08 NOTE — Clinical Note
Interested in adding contrave to her weight loss plan. She is not seeing the weight loss like she was. Will discuss with Dr Lora she has f./u with labs next month there- will need the naltrexone/wellbutrin called in separately for affordability . I dint mind sending for her if approved- let me know. She is also on abilify and viibryd so just XIN

## 2023-06-13 ENCOUNTER — PATIENT MESSAGE (OUTPATIENT)
Dept: INTERNAL MEDICINE | Facility: CLINIC | Age: 28
End: 2023-06-13
Payer: COMMERCIAL

## 2023-06-13 NOTE — TELEPHONE ENCOUNTER
"Please see below from Preeti's note from 6/8/2023 visit.   Pt has scheduled appt on 7/7/2023.    "Interested in adding contrave to her weight loss plan. She is not seeing the weight loss like she was. Will discuss with Dr Lora she has f./u with labs next month there- will need the naltrexone/wellbutrin called in separately for affordability."  "

## 2023-06-23 ENCOUNTER — LAB VISIT (OUTPATIENT)
Dept: LAB | Facility: HOSPITAL | Age: 28
End: 2023-06-23
Attending: INTERNAL MEDICINE
Payer: COMMERCIAL

## 2023-06-23 DIAGNOSIS — K29.70 GASTRITIS WITHOUT BLEEDING, UNSPECIFIED CHRONICITY, UNSPECIFIED GASTRITIS TYPE: ICD-10-CM

## 2023-06-23 DIAGNOSIS — E66.09 CLASS 2 OBESITY DUE TO EXCESS CALORIES WITHOUT SERIOUS COMORBIDITY WITH BODY MASS INDEX (BMI) OF 39.0 TO 39.9 IN ADULT: ICD-10-CM

## 2023-06-23 DIAGNOSIS — F32.5 MAJOR DEPRESSIVE DISORDER WITH SINGLE EPISODE, IN FULL REMISSION: ICD-10-CM

## 2023-06-23 LAB
ALBUMIN SERPL BCP-MCNC: 4 G/DL (ref 3.5–5.2)
ALP SERPL-CCNC: 63 U/L (ref 55–135)
ALT SERPL W/O P-5'-P-CCNC: 12 U/L (ref 10–44)
ANION GAP SERPL CALC-SCNC: 11 MMOL/L (ref 8–16)
AST SERPL-CCNC: 16 U/L (ref 10–40)
BASOPHILS # BLD AUTO: 0.03 K/UL (ref 0–0.2)
BASOPHILS NFR BLD: 0.4 % (ref 0–1.9)
BILIRUB SERPL-MCNC: 0.4 MG/DL (ref 0.1–1)
BUN SERPL-MCNC: 12 MG/DL (ref 6–20)
CALCIUM SERPL-MCNC: 9.7 MG/DL (ref 8.7–10.5)
CHLORIDE SERPL-SCNC: 106 MMOL/L (ref 95–110)
CHOLEST SERPL-MCNC: 131 MG/DL (ref 120–199)
CHOLEST/HDLC SERPL: 2.9 {RATIO} (ref 2–5)
CO2 SERPL-SCNC: 23 MMOL/L (ref 23–29)
CREAT SERPL-MCNC: 0.8 MG/DL (ref 0.5–1.4)
DIFFERENTIAL METHOD: NORMAL
EOSINOPHIL # BLD AUTO: 0.1 K/UL (ref 0–0.5)
EOSINOPHIL NFR BLD: 0.9 % (ref 0–8)
ERYTHROCYTE [DISTWIDTH] IN BLOOD BY AUTOMATED COUNT: 13.4 % (ref 11.5–14.5)
EST. GFR  (NO RACE VARIABLE): >60 ML/MIN/1.73 M^2
ESTIMATED AVG GLUCOSE: 91 MG/DL (ref 68–131)
GLUCOSE SERPL-MCNC: 94 MG/DL (ref 70–110)
HBA1C MFR BLD: 4.8 % (ref 4–5.6)
HCT VFR BLD AUTO: 42.8 % (ref 37–48.5)
HDLC SERPL-MCNC: 45 MG/DL (ref 40–75)
HDLC SERPL: 34.4 % (ref 20–50)
HGB BLD-MCNC: 14 G/DL (ref 12–16)
IMM GRANULOCYTES # BLD AUTO: 0.02 K/UL (ref 0–0.04)
IMM GRANULOCYTES NFR BLD AUTO: 0.3 % (ref 0–0.5)
LDLC SERPL CALC-MCNC: 75.6 MG/DL (ref 63–159)
LYMPHOCYTES # BLD AUTO: 2.1 K/UL (ref 1–4.8)
LYMPHOCYTES NFR BLD: 26.9 % (ref 18–48)
MCH RBC QN AUTO: 28.8 PG (ref 27–31)
MCHC RBC AUTO-ENTMCNC: 32.7 G/DL (ref 32–36)
MCV RBC AUTO: 88 FL (ref 82–98)
MONOCYTES # BLD AUTO: 0.5 K/UL (ref 0.3–1)
MONOCYTES NFR BLD: 5.8 % (ref 4–15)
NEUTROPHILS # BLD AUTO: 5.1 K/UL (ref 1.8–7.7)
NEUTROPHILS NFR BLD: 65.7 % (ref 38–73)
NONHDLC SERPL-MCNC: 86 MG/DL
NRBC BLD-RTO: 0 /100 WBC
PLATELET # BLD AUTO: 249 K/UL (ref 150–450)
PMV BLD AUTO: 9.7 FL (ref 9.2–12.9)
POTASSIUM SERPL-SCNC: 4.5 MMOL/L (ref 3.5–5.1)
PROT SERPL-MCNC: 7.8 G/DL (ref 6–8.4)
RBC # BLD AUTO: 4.86 M/UL (ref 4–5.4)
SODIUM SERPL-SCNC: 140 MMOL/L (ref 136–145)
T4 FREE SERPL-MCNC: 0.92 NG/DL (ref 0.71–1.51)
TRIGL SERPL-MCNC: 52 MG/DL (ref 30–150)
TSH SERPL DL<=0.005 MIU/L-ACNC: 2.01 UIU/ML (ref 0.4–4)
WBC # BLD AUTO: 7.7 K/UL (ref 3.9–12.7)

## 2023-06-23 PROCEDURE — 84439 ASSAY OF FREE THYROXINE: CPT | Performed by: INTERNAL MEDICINE

## 2023-06-23 PROCEDURE — 84443 ASSAY THYROID STIM HORMONE: CPT | Performed by: INTERNAL MEDICINE

## 2023-06-23 PROCEDURE — 85025 COMPLETE CBC W/AUTO DIFF WBC: CPT | Performed by: INTERNAL MEDICINE

## 2023-06-23 PROCEDURE — 83036 HEMOGLOBIN GLYCOSYLATED A1C: CPT | Performed by: INTERNAL MEDICINE

## 2023-06-23 PROCEDURE — 80053 COMPREHEN METABOLIC PANEL: CPT | Performed by: INTERNAL MEDICINE

## 2023-06-23 PROCEDURE — 80061 LIPID PANEL: CPT | Performed by: INTERNAL MEDICINE

## 2023-06-23 PROCEDURE — 36415 COLL VENOUS BLD VENIPUNCTURE: CPT | Performed by: INTERNAL MEDICINE

## 2023-07-07 ENCOUNTER — OFFICE VISIT (OUTPATIENT)
Dept: INTERNAL MEDICINE | Facility: CLINIC | Age: 28
End: 2023-07-07
Payer: COMMERCIAL

## 2023-07-07 ENCOUNTER — PATIENT MESSAGE (OUTPATIENT)
Dept: INTERNAL MEDICINE | Facility: CLINIC | Age: 28
End: 2023-07-07

## 2023-07-07 ENCOUNTER — TELEPHONE (OUTPATIENT)
Dept: BARIATRICS | Facility: CLINIC | Age: 28
End: 2023-07-07
Payer: COMMERCIAL

## 2023-07-07 VITALS
RESPIRATION RATE: 16 BRPM | DIASTOLIC BLOOD PRESSURE: 72 MMHG | WEIGHT: 257.5 LBS | HEIGHT: 68 IN | BODY MASS INDEX: 39.03 KG/M2 | OXYGEN SATURATION: 99 % | HEART RATE: 100 BPM | SYSTOLIC BLOOD PRESSURE: 124 MMHG

## 2023-07-07 DIAGNOSIS — E66.09 CLASS 2 OBESITY DUE TO EXCESS CALORIES WITHOUT SERIOUS COMORBIDITY WITH BODY MASS INDEX (BMI) OF 39.0 TO 39.9 IN ADULT: Primary | ICD-10-CM

## 2023-07-07 DIAGNOSIS — F32.5 MAJOR DEPRESSIVE DISORDER WITH SINGLE EPISODE, IN FULL REMISSION: ICD-10-CM

## 2023-07-07 PROCEDURE — 3078F DIAST BP <80 MM HG: CPT | Mod: CPTII,S$GLB,, | Performed by: INTERNAL MEDICINE

## 2023-07-07 PROCEDURE — 3008F PR BODY MASS INDEX (BMI) DOCUMENTED: ICD-10-PCS | Mod: CPTII,S$GLB,, | Performed by: INTERNAL MEDICINE

## 2023-07-07 PROCEDURE — 3044F PR MOST RECENT HEMOGLOBIN A1C LEVEL <7.0%: ICD-10-PCS | Mod: CPTII,S$GLB,, | Performed by: INTERNAL MEDICINE

## 2023-07-07 PROCEDURE — 1160F RVW MEDS BY RX/DR IN RCRD: CPT | Mod: CPTII,S$GLB,, | Performed by: INTERNAL MEDICINE

## 2023-07-07 PROCEDURE — 3044F HG A1C LEVEL LT 7.0%: CPT | Mod: CPTII,S$GLB,, | Performed by: INTERNAL MEDICINE

## 2023-07-07 PROCEDURE — 1159F PR MEDICATION LIST DOCUMENTED IN MEDICAL RECORD: ICD-10-PCS | Mod: CPTII,S$GLB,, | Performed by: INTERNAL MEDICINE

## 2023-07-07 PROCEDURE — 99999 PR PBB SHADOW E&M-EST. PATIENT-LVL III: ICD-10-PCS | Mod: PBBFAC,,, | Performed by: INTERNAL MEDICINE

## 2023-07-07 PROCEDURE — 1160F PR REVIEW ALL MEDS BY PRESCRIBER/CLIN PHARMACIST DOCUMENTED: ICD-10-PCS | Mod: CPTII,S$GLB,, | Performed by: INTERNAL MEDICINE

## 2023-07-07 PROCEDURE — 3078F PR MOST RECENT DIASTOLIC BLOOD PRESSURE < 80 MM HG: ICD-10-PCS | Mod: CPTII,S$GLB,, | Performed by: INTERNAL MEDICINE

## 2023-07-07 PROCEDURE — 99214 OFFICE O/P EST MOD 30 MIN: CPT | Mod: S$GLB,,, | Performed by: INTERNAL MEDICINE

## 2023-07-07 PROCEDURE — 3074F SYST BP LT 130 MM HG: CPT | Mod: CPTII,S$GLB,, | Performed by: INTERNAL MEDICINE

## 2023-07-07 PROCEDURE — 99214 PR OFFICE/OUTPT VISIT, EST, LEVL IV, 30-39 MIN: ICD-10-PCS | Mod: S$GLB,,, | Performed by: INTERNAL MEDICINE

## 2023-07-07 PROCEDURE — 99999 PR PBB SHADOW E&M-EST. PATIENT-LVL III: CPT | Mod: PBBFAC,,, | Performed by: INTERNAL MEDICINE

## 2023-07-07 PROCEDURE — 3074F PR MOST RECENT SYSTOLIC BLOOD PRESSURE < 130 MM HG: ICD-10-PCS | Mod: CPTII,S$GLB,, | Performed by: INTERNAL MEDICINE

## 2023-07-07 PROCEDURE — 1159F MED LIST DOCD IN RCRD: CPT | Mod: CPTII,S$GLB,, | Performed by: INTERNAL MEDICINE

## 2023-07-07 PROCEDURE — 3008F BODY MASS INDEX DOCD: CPT | Mod: CPTII,S$GLB,, | Performed by: INTERNAL MEDICINE

## 2023-07-07 RX ORDER — BUPROPION HYDROCHLORIDE 150 MG/1
150 TABLET ORAL DAILY
Qty: 30 TABLET | Refills: 11 | Status: SHIPPED | OUTPATIENT
Start: 2023-07-07 | End: 2024-07-06

## 2023-07-07 RX ORDER — LUMATEPERONE 42 MG/1
42 CAPSULE ORAL
COMMUNITY
End: 2023-10-04

## 2023-07-07 NOTE — PROGRESS NOTES
Subjective:       Patient ID: Kathy Beltrán is a 28 y.o. female.    Chief Complaint: Follow-up      HPI:  Patient is known to me and presents for follow up weight management. She has obesity, MDD and possible gastritis. Labs from 6/23/23 personally reviewed, interperted and discussed today.    A1C 4.8%, normal  LDL 75, normal  GFR > 60, normal  TSH 2.0, normal     She is on Mounjaro and at last visit had reported she lost 50 pounds in 6 months.   Has been taking 7.5mg weekly since Nov 2022. Denies medication side effects.   We planned to increase dose to 10mg weekly last visit but cost has increased to $500/month and she never increased. No significant weight loss since last visit.  Has struggled with obesity most of her adult life and never had this much success.   Denies personal or FH of thyroid cancer, pancreatic cancer, pancreatitis.      Feb 2023 started on protonix. Saw GI and sx are improved and has weaned off since last visit.     Depresison: was on viibryd and abilify. Follows with psychiatry.      PAP-establishedwith Dr. Kelly     Tobacco use: denies use  EtOH use: juaquin    History reviewed. No pertinent past medical history.    Family History   Problem Relation Age of Onset    Hypertension Mother     Heart disease Mother     Hypertension Father     Heart disease Father     Hypertension Maternal Grandmother     Heart disease Maternal Grandmother     Thyroid disease Maternal Grandmother     Hypertension Maternal Grandfather     Heart disease Maternal Grandfather        Social History     Socioeconomic History    Marital status:    Tobacco Use    Smoking status: Never    Smokeless tobacco: Never   Substance and Sexual Activity    Alcohol use: Yes     Comment: occ    Drug use: Never    Sexual activity: Yes     Partners: Male       Review of Systems   Constitutional:  Negative for activity change, fatigue, fever and unexpected weight change.   HENT:  Negative for congestion, ear pain, hearing loss,  rhinorrhea and sore throat.    Eyes:  Negative for redness and visual disturbance.   Respiratory:  Negative for cough, shortness of breath and wheezing.    Cardiovascular:  Negative for chest pain, palpitations and leg swelling.   Gastrointestinal:  Negative for abdominal pain, constipation, diarrhea, nausea and vomiting.   Genitourinary:  Negative for dysuria, frequency and urgency.   Musculoskeletal:  Negative for back pain, joint swelling and neck pain.   Skin:  Negative for color change, rash and wound.   Neurological:  Negative for dizziness, tremors, weakness, light-headedness and headaches.       Objective:      Physical Exam  Vitals reviewed.   Constitutional:       General: She is not in acute distress.     Appearance: She is well-developed. She is obese.   HENT:      Head: Normocephalic and atraumatic.      Right Ear: External ear normal.      Left Ear: External ear normal.      Nose: Nose normal.   Eyes:      General:         Right eye: No discharge.         Left eye: No discharge.      Conjunctiva/sclera: Conjunctivae normal.   Neck:      Thyroid: No thyromegaly.   Cardiovascular:      Rate and Rhythm: Normal rate and regular rhythm.      Heart sounds: No murmur heard.  Pulmonary:      Effort: Pulmonary effort is normal. No respiratory distress.      Breath sounds: Normal breath sounds.   Skin:     General: Skin is warm and dry.   Neurological:      Mental Status: She is alert and oriented to person, place, and time. Mental status is at baseline.   Psychiatric:         Behavior: Behavior normal.         Thought Content: Thought content normal.       Assessment:       1. Class 2 obesity due to excess calories without serious comorbidity with body mass index (BMI) of 39.0 to 39.9 in adult    2. Major depressive disorder with single episode, in full remission        Plan:       1. Class 2 obesity due to excess calories without serious comorbidity with body mass index (BMI) of 39.0 to 39.9 in adult  Chronic  uncontrolled  No longer seeing success on Mounjaro 7.5mg weekly and price increased to $500/month so can not afford to continue to use or trial dose increase  Contrave also cost prohibitive  Will add wellbutrin with h/o anxiety/depression to see if can aid in some weight loss. Discussed I do not expect this medication to provide the same effect as Mounjaro but can be used to help with healthy lifestyle: diet and exercise  We did discuss that even with continued GLP-1 to achieve her ideal weight she would likely need bariatric surgery. She is not interested in surgical management at this time  Will follow weight again in 3 months to ensure improvement or at least stability  -     buPROPion (WELLBUTRIN XL) 150 MG TB24 tablet; Take 1 tablet (150 mg total) by mouth once daily.  Dispense: 30 tablet; Refill: 11    2. Major depressive disorder with single episode, in full remission  Chronic stable  Will trial adding wellbutrin for mood disorder with weight loss benefit  Does have some binge eating habits-----maybe consider off label vyvanse but would discuss with psych first  -     buPROPion (WELLBUTRIN XL) 150 MG TB24 tablet; Take 1 tablet (150 mg total) by mouth once daily.  Dispense: 30 tablet; Refill: 11       RTC 3 months and PRN

## 2023-08-07 ENCOUNTER — TELEPHONE (OUTPATIENT)
Dept: BARIATRICS | Facility: CLINIC | Age: 28
End: 2023-08-07
Payer: COMMERCIAL

## 2023-09-07 ENCOUNTER — PATIENT MESSAGE (OUTPATIENT)
Dept: INTERNAL MEDICINE | Facility: CLINIC | Age: 28
End: 2023-09-07
Payer: COMMERCIAL

## 2023-09-08 RX ORDER — SEMAGLUTIDE 0.25 MG/.5ML
0.25 INJECTION, SOLUTION SUBCUTANEOUS
Qty: 3 ML | Refills: 0 | Status: SHIPPED | OUTPATIENT
Start: 2023-09-08 | End: 2023-10-04

## 2023-09-08 NOTE — TELEPHONE ENCOUNTER
Wegovy sent to [facility]. Start low dose 0.25mg weekly for 4 weeks then can titrate up if tolerating

## 2023-10-04 ENCOUNTER — OFFICE VISIT (OUTPATIENT)
Dept: INTERNAL MEDICINE | Facility: CLINIC | Age: 28
End: 2023-10-04
Payer: COMMERCIAL

## 2023-10-04 ENCOUNTER — LAB VISIT (OUTPATIENT)
Dept: LAB | Facility: HOSPITAL | Age: 28
End: 2023-10-04
Attending: INTERNAL MEDICINE
Payer: COMMERCIAL

## 2023-10-04 VITALS
SYSTOLIC BLOOD PRESSURE: 112 MMHG | HEIGHT: 68 IN | WEIGHT: 255.5 LBS | HEART RATE: 68 BPM | RESPIRATION RATE: 16 BRPM | OXYGEN SATURATION: 99 % | DIASTOLIC BLOOD PRESSURE: 82 MMHG | BODY MASS INDEX: 38.72 KG/M2

## 2023-10-04 DIAGNOSIS — Z23 NEED FOR VACCINATION: ICD-10-CM

## 2023-10-04 DIAGNOSIS — Z15.89 METHYLENETETRAHYDROFOLATE REDUCTASE (MTHFR) GENE MUTATION: ICD-10-CM

## 2023-10-04 DIAGNOSIS — F32.5 MAJOR DEPRESSIVE DISORDER WITH SINGLE EPISODE, IN FULL REMISSION: ICD-10-CM

## 2023-10-04 DIAGNOSIS — E66.09 CLASS 2 OBESITY DUE TO EXCESS CALORIES WITHOUT SERIOUS COMORBIDITY WITH BODY MASS INDEX (BMI) OF 38.0 TO 38.9 IN ADULT: ICD-10-CM

## 2023-10-04 DIAGNOSIS — F50.81 BINGE EATING DISORDER: Primary | ICD-10-CM

## 2023-10-04 PROCEDURE — 99999 PR PBB SHADOW E&M-EST. PATIENT-LVL IV: CPT | Mod: PBBFAC,,, | Performed by: INTERNAL MEDICINE

## 2023-10-04 PROCEDURE — 3044F HG A1C LEVEL LT 7.0%: CPT | Mod: CPTII,S$GLB,, | Performed by: INTERNAL MEDICINE

## 2023-10-04 PROCEDURE — 3074F PR MOST RECENT SYSTOLIC BLOOD PRESSURE < 130 MM HG: ICD-10-PCS | Mod: CPTII,S$GLB,, | Performed by: INTERNAL MEDICINE

## 2023-10-04 PROCEDURE — 3079F PR MOST RECENT DIASTOLIC BLOOD PRESSURE 80-89 MM HG: ICD-10-PCS | Mod: CPTII,S$GLB,, | Performed by: INTERNAL MEDICINE

## 2023-10-04 PROCEDURE — 99999 PR PBB SHADOW E&M-EST. PATIENT-LVL IV: ICD-10-PCS | Mod: PBBFAC,,, | Performed by: INTERNAL MEDICINE

## 2023-10-04 PROCEDURE — 3008F BODY MASS INDEX DOCD: CPT | Mod: CPTII,S$GLB,, | Performed by: INTERNAL MEDICINE

## 2023-10-04 PROCEDURE — 36415 COLL VENOUS BLD VENIPUNCTURE: CPT | Performed by: INTERNAL MEDICINE

## 2023-10-04 PROCEDURE — 90686 FLU VACCINE (QUAD) GREATER THAN OR EQUAL TO 3YO PRESERVATIVE FREE IM: ICD-10-PCS | Mod: S$GLB,,, | Performed by: INTERNAL MEDICINE

## 2023-10-04 PROCEDURE — 3074F SYST BP LT 130 MM HG: CPT | Mod: CPTII,S$GLB,, | Performed by: INTERNAL MEDICINE

## 2023-10-04 PROCEDURE — 1160F RVW MEDS BY RX/DR IN RCRD: CPT | Mod: CPTII,S$GLB,, | Performed by: INTERNAL MEDICINE

## 2023-10-04 PROCEDURE — 90471 FLU VACCINE (QUAD) GREATER THAN OR EQUAL TO 3YO PRESERVATIVE FREE IM: ICD-10-PCS | Mod: S$GLB,,, | Performed by: INTERNAL MEDICINE

## 2023-10-04 PROCEDURE — 90471 IMMUNIZATION ADMIN: CPT | Mod: S$GLB,,, | Performed by: INTERNAL MEDICINE

## 2023-10-04 PROCEDURE — 99214 PR OFFICE/OUTPT VISIT, EST, LEVL IV, 30-39 MIN: ICD-10-PCS | Mod: 25,S$GLB,, | Performed by: INTERNAL MEDICINE

## 2023-10-04 PROCEDURE — 83090 ASSAY OF HOMOCYSTEINE: CPT | Performed by: INTERNAL MEDICINE

## 2023-10-04 PROCEDURE — 82607 VITAMIN B-12: CPT | Performed by: INTERNAL MEDICINE

## 2023-10-04 PROCEDURE — 99214 OFFICE O/P EST MOD 30 MIN: CPT | Mod: 25,S$GLB,, | Performed by: INTERNAL MEDICINE

## 2023-10-04 PROCEDURE — 3044F PR MOST RECENT HEMOGLOBIN A1C LEVEL <7.0%: ICD-10-PCS | Mod: CPTII,S$GLB,, | Performed by: INTERNAL MEDICINE

## 2023-10-04 PROCEDURE — 1159F PR MEDICATION LIST DOCUMENTED IN MEDICAL RECORD: ICD-10-PCS | Mod: CPTII,S$GLB,, | Performed by: INTERNAL MEDICINE

## 2023-10-04 PROCEDURE — 1159F MED LIST DOCD IN RCRD: CPT | Mod: CPTII,S$GLB,, | Performed by: INTERNAL MEDICINE

## 2023-10-04 PROCEDURE — 3008F PR BODY MASS INDEX (BMI) DOCUMENTED: ICD-10-PCS | Mod: CPTII,S$GLB,, | Performed by: INTERNAL MEDICINE

## 2023-10-04 PROCEDURE — 1160F PR REVIEW ALL MEDS BY PRESCRIBER/CLIN PHARMACIST DOCUMENTED: ICD-10-PCS | Mod: CPTII,S$GLB,, | Performed by: INTERNAL MEDICINE

## 2023-10-04 PROCEDURE — 3079F DIAST BP 80-89 MM HG: CPT | Mod: CPTII,S$GLB,, | Performed by: INTERNAL MEDICINE

## 2023-10-04 PROCEDURE — 90686 IIV4 VACC NO PRSV 0.5 ML IM: CPT | Mod: S$GLB,,, | Performed by: INTERNAL MEDICINE

## 2023-10-04 RX ORDER — TIRZEPATIDE 10 MG/.5ML
INJECTION, SOLUTION SUBCUTANEOUS
COMMUNITY
Start: 2023-09-01 | End: 2023-11-28

## 2023-10-04 RX ORDER — LISDEXAMFETAMINE DIMESYLATE 30 MG/1
30 CAPSULE ORAL EVERY MORNING
Qty: 30 CAPSULE | Refills: 0 | Status: SHIPPED | OUTPATIENT
Start: 2023-10-04 | End: 2023-11-01 | Stop reason: SDUPTHER

## 2023-10-04 RX ORDER — LAMOTRIGINE 200 MG/1
200 TABLET ORAL DAILY
COMMUNITY
Start: 2023-10-03

## 2023-10-04 NOTE — PROGRESS NOTES
Subjective:       Patient ID: Kathy Beltrán is a 28 y.o. female.    Chief Complaint: Follow-up      HPI:  Patient is known to me and presents for follow up weight management. She has obesity, MDD and possible gastritis. Labs from 6/23/23 personally reviewed, interperted and discussed today.     A1C 4.8%, normal  LDL 75, normal  GFR > 60, normal  TSH 2.0, normal     She is on Mounjaro and at last visit had reported she lost 50 pounds in 6 months.   Has been taking 7.5mg weekly since Nov 2022. Denies medication side effects.   We planned to increase dose to 10mg weekly last visit but cost has increased to $500/month. No significant weight loss since last visit.  Has struggled with obesity most of her adult life and never had this much success.   Denies personal or FH of thyroid cancer, pancreatic cancer, pancreatitis.    Wegovy not available. Referred for bariatric surgery but not covered as BMI not > 39.    Feb 2023 started on protonix. Saw GI and sx are improved and has weaned off since last visit.      Depresison: on lamictal and wellbutrin. Working well. Follows with psychiatry.      PAP-establishedwith Dr. Kelly     Tobacco use: denies use  EtOH use: juaquin        257--> 255  7.5mg  10mg      History reviewed. No pertinent past medical history.    Family History   Problem Relation Age of Onset    Hypertension Mother     Heart disease Mother     Hypertension Father     Heart disease Father     Hypertension Maternal Grandmother     Heart disease Maternal Grandmother     Thyroid disease Maternal Grandmother     Hypertension Maternal Grandfather     Heart disease Maternal Grandfather        Social History     Socioeconomic History    Marital status:    Tobacco Use    Smoking status: Never    Smokeless tobacco: Never   Substance and Sexual Activity    Alcohol use: Yes     Comment: occ    Drug use: Never    Sexual activity: Yes     Partners: Male       Review of Systems   Constitutional:  Positive for  appetite change. Negative for activity change, fatigue, fever and unexpected weight change.   HENT:  Negative for congestion, ear pain, hearing loss, rhinorrhea and sore throat.    Eyes:  Negative for redness and visual disturbance.   Respiratory:  Negative for cough, shortness of breath and wheezing.    Cardiovascular:  Negative for chest pain, palpitations and leg swelling.   Gastrointestinal:  Negative for abdominal pain, constipation, diarrhea, nausea and vomiting.   Genitourinary:  Negative for dysuria, frequency and urgency.   Musculoskeletal:  Negative for back pain, joint swelling and neck pain.   Skin:  Negative for color change, rash and wound.   Neurological:  Negative for dizziness, tremors, weakness, light-headedness and headaches.         Objective:      Physical Exam  Vitals reviewed.   Constitutional:       General: She is not in acute distress.     Appearance: She is well-developed. She is obese.   HENT:      Head: Normocephalic and atraumatic.      Right Ear: External ear normal.      Left Ear: External ear normal.      Nose: Nose normal.   Eyes:      General:         Right eye: No discharge.         Left eye: No discharge.      Conjunctiva/sclera: Conjunctivae normal.   Neck:      Thyroid: No thyromegaly.   Cardiovascular:      Rate and Rhythm: Normal rate and regular rhythm.   Pulmonary:      Effort: Pulmonary effort is normal. No respiratory distress.   Skin:     General: Skin is warm and dry.   Neurological:      Mental Status: She is alert and oriented to person, place, and time.   Psychiatric:         Behavior: Behavior normal.         Thought Content: Thought content normal.         Assessment:       1. Binge eating disorder    2. Class 2 obesity due to excess calories without serious comorbidity with body mass index (BMI) of 38.0 to 38.9 in adult    3. Methylenetetrahydrofolate reductase (MTHFR) gene mutation    4. Major depressive disorder with single episode, in full remission    5. Need  for vaccination        Plan:       1. Binge eating disorder  Chronic uncontrolled  Wellbutrin helpful for depression sx but still overeating  GLP-1 is not cost effective option for weight loss/appetite suppression  Trial of off label use vyvanse for binge-eating disorder. Side effects discussed    Agree to monthly refill, no early refills  [facility]  I will monitor   Agrees only 1 provider can prescribed controlled substances. Understands if other providers prescribe for acute issue she must let me know  Agrees to random UDS and understands if ever positive, including THC will no longer prescribe  Understands I reserve the risk to stop prescribing controlled substances at any time    -     lisdexamfetamine (VYVANSE) 30 MG capsule; Take 1 capsule (30 mg total) by mouth every morning.  Dispense: 30 capsule; Refill: 0    2. Class 2 obesity due to excess calories without serious comorbidity with body mass index (BMI) of 38.0 to 38.9 in adult  Chronic stable  GLP-1 not cost effective any longer  Not interested in surgical management at this time; not covered either  Trial of off label vyvanse for binge eating disorder as noted above    3. Methylenetetrahydrofolate reductase (MTHFR) gene mutation  New diagnosis  Noted on recent genetic testing from outside source  -     VITAMIN B12; Future; Expected date: 10/04/2023  -     HOMOCYSTEINE, SERUM; Future; Expected date: 10/04/2023    4. Major depressive disorder with single episode, in full remission  Chronic controlled  Cont lamictal and wellbutrin per psych    5. Need for vaccination  Done today  -     Influenza - Quadrivalent (PF)       RTC 3 months follow up vyvanse/weight loss

## 2023-10-05 LAB
HCYS SERPL-SCNC: 7.2 UMOL/L (ref 4–15.5)
VIT B12 SERPL-MCNC: 708 PG/ML (ref 210–950)

## 2023-10-23 ENCOUNTER — ON-DEMAND VIRTUAL (OUTPATIENT)
Dept: URGENT CARE | Facility: CLINIC | Age: 28
End: 2023-10-23
Payer: COMMERCIAL

## 2023-10-23 DIAGNOSIS — J02.0 STREP THROAT: Primary | ICD-10-CM

## 2023-10-23 PROCEDURE — 99213 OFFICE O/P EST LOW 20 MIN: CPT | Mod: 95,,, | Performed by: FAMILY MEDICINE

## 2023-10-23 PROCEDURE — 99213 PR OFFICE/OUTPT VISIT, EST, LEVL III, 20-29 MIN: ICD-10-PCS | Mod: 95,,, | Performed by: FAMILY MEDICINE

## 2023-10-23 RX ORDER — AZITHROMYCIN 250 MG/1
TABLET, FILM COATED ORAL
Qty: 6 TABLET | Refills: 0 | Status: SHIPPED | OUTPATIENT
Start: 2023-10-23 | End: 2023-10-28

## 2023-10-23 RX ORDER — ONDANSETRON 4 MG/1
4 TABLET, ORALLY DISINTEGRATING ORAL EVERY 8 HOURS PRN
Qty: 30 TABLET | Refills: 0 | Status: SHIPPED | OUTPATIENT
Start: 2023-10-23 | End: 2024-01-11

## 2023-10-23 NOTE — PROGRESS NOTES
Subjective:      Patient ID: Kathy Beltrán is a 28 y.o. female.    Vitals:  vitals were not taken for this visit.     Chief Complaint: Sore Throat      Visit Type: TELE AUDIOVISUAL    Present with the patient at the time of consultation: TELEMED PRESENT WITH PATIENT: None    History reviewed. No pertinent past medical history.  Past Surgical History:   Procedure Laterality Date    ADENOIDECTOMY      BREAST BIOPSY       SECTION          CHOLECYSTECTOMY      CHOLESTEATOMA EXCISION      TONSILLECTOMY       Review of patient's allergies indicates:   Allergen Reactions    Pcn [penicillins]      Was told had swelling as a baby    Sulfa (sulfonamide antibiotics)      hives     Current Outpatient Medications on File Prior to Visit   Medication Sig Dispense Refill    buPROPion (WELLBUTRIN XL) 150 MG TB24 tablet Take 1 tablet (150 mg total) by mouth once daily. 30 tablet 11    lamoTRIgine (LAMICTAL) 200 MG tablet Take 200 mg by mouth.      lisdexamfetamine (VYVANSE) 30 MG capsule Take 1 capsule (30 mg total) by mouth every morning. 30 capsule 0    tirzepatide (MOUNJARO) 10 mg/0.5 mL PnIj       [DISCONTINUED] vilazodone (VIIBRYD) 20 mg Tab Take 1 tablet (20 mg total) by mouth once daily with food. 30 tablet 0     No current facility-administered medications on file prior to visit.     Family History   Problem Relation Age of Onset    Hypertension Mother     Heart disease Mother     Hypertension Father     Heart disease Father     Hypertension Maternal Grandmother     Heart disease Maternal Grandmother     Thyroid disease Maternal Grandmother     Hypertension Maternal Grandfather     Heart disease Maternal Grandfather        Medications Ordered                [facility] [address]   [address] [address]    Telephone: [phone]   Fax: [phone]   Hours: Not open 24 hours                         E-Prescribed (2 of 2)              azithromycin (Z-CICI)  250 MG tablet    Sig: Take 2 tablets by mouth on day 1; Take 1 tablet by mouth on days 2-5       Start: 10/23/23     Quantity: 6 tablet Refills: 0                         ondansetron (ZOFRAN-ODT) 4 MG TbDL    Sig: Take 1 tablet (4 mg total) by mouth every 8 (eight) hours as needed.       Start: 10/23/23     Quantity: 30 tablet Refills: 0                           Ohs Peq Odvv Intake    10/23/2023  1:52 PM CDT - Filed by Patient   Describe your reason for todays visit Possible strep   What is your current physical address in the event of a medical emergency? [address] [facility] [address]   Are you able to take your vital signs? No   Please attach any relevant images or files          Sore Throat   This is a new problem. The current episode started today ( tested positive for strep today.). The problem has been gradually worsening. Neither side of throat is experiencing more pain than the other. There has been no fever. Pertinent negatives include no abdominal pain, congestion, coughing, diarrhea, drooling, ear discharge, ear pain, headaches, hoarse voice, plugged ear sensation, neck pain, shortness of breath, stridor, swollen glands, trouble swallowing or vomiting. She has had exposure to strep. She has tried nothing for the symptoms.       HENT:  Positive for sore throat. Negative for ear pain, ear discharge, drooling, congestion and trouble swallowing.    Neck: Negative for neck pain.   Respiratory:  Negative for cough, shortness of breath and stridor.    Gastrointestinal:  Negative for abdominal pain, vomiting and diarrhea.   Neurological:  Negative for headaches.        Objective:   The physical exam was conducted virtually.  Physical Exam   Constitutional: She is oriented to person, place, and time. obesity  HENT:   Head: Normocephalic and atraumatic.   Ears:   Right Ear: External ear normal.   Left Ear: External ear normal.   Nose: Nose normal.   Pulmonary/Chest: Effort normal. No respiratory  distress.   Neurological: She is alert and oriented to person, place, and time.   Skin: Skin is dry.       Assessment:     1. Strep throat        Plan:       Strep throat  -     azithromycin (Z-CICI) 250 MG tablet; Take 2 tablets by mouth on day 1; Take 1 tablet by mouth on days 2-5  Dispense: 6 tablet; Refill: 0    Other orders  -     ondansetron (ZOFRAN-ODT) 4 MG TbDL; Take 1 tablet (4 mg total) by mouth every 8 (eight) hours as needed.  Dispense: 30 tablet; Refill: 0

## 2023-10-25 ENCOUNTER — OFFICE VISIT (OUTPATIENT)
Dept: INTERNAL MEDICINE | Facility: CLINIC | Age: 28
End: 2023-10-25
Payer: COMMERCIAL

## 2023-10-25 VITALS
WEIGHT: 248.25 LBS | BODY MASS INDEX: 37.62 KG/M2 | RESPIRATION RATE: 18 BRPM | SYSTOLIC BLOOD PRESSURE: 114 MMHG | HEIGHT: 68 IN | HEART RATE: 96 BPM | DIASTOLIC BLOOD PRESSURE: 66 MMHG | OXYGEN SATURATION: 98 %

## 2023-10-25 DIAGNOSIS — K59.09 CHRONIC CONSTIPATION: ICD-10-CM

## 2023-10-25 DIAGNOSIS — R50.9 FEVER, UNSPECIFIED FEVER CAUSE: ICD-10-CM

## 2023-10-25 DIAGNOSIS — J02.9 SORE THROAT: ICD-10-CM

## 2023-10-25 DIAGNOSIS — K58.1 IRRITABLE BOWEL SYNDROME WITH CONSTIPATION: ICD-10-CM

## 2023-10-25 DIAGNOSIS — J02.0 STREP PHARYNGITIS: Primary | ICD-10-CM

## 2023-10-25 LAB
CTP QC/QA: YES
CTP QC/QA: YES
MOLECULAR STREP A: POSITIVE
SARS-COV-2 AG RESP QL IA.RAPID: NEGATIVE

## 2023-10-25 PROCEDURE — 3074F SYST BP LT 130 MM HG: CPT | Mod: CPTII,S$GLB,, | Performed by: NURSE PRACTITIONER

## 2023-10-25 PROCEDURE — 3078F DIAST BP <80 MM HG: CPT | Mod: CPTII,S$GLB,, | Performed by: NURSE PRACTITIONER

## 2023-10-25 PROCEDURE — 3044F HG A1C LEVEL LT 7.0%: CPT | Mod: CPTII,S$GLB,, | Performed by: NURSE PRACTITIONER

## 2023-10-25 PROCEDURE — 3008F BODY MASS INDEX DOCD: CPT | Mod: CPTII,S$GLB,, | Performed by: NURSE PRACTITIONER

## 2023-10-25 PROCEDURE — 87651 STREP A DNA AMP PROBE: CPT | Mod: QW,S$GLB,, | Performed by: NURSE PRACTITIONER

## 2023-10-25 PROCEDURE — 1159F PR MEDICATION LIST DOCUMENTED IN MEDICAL RECORD: ICD-10-PCS | Mod: CPTII,S$GLB,, | Performed by: NURSE PRACTITIONER

## 2023-10-25 PROCEDURE — 87811 SARS CORONAVIRUS 2 ANTIGEN POCT, MANUAL READ: ICD-10-PCS | Mod: QW,S$GLB,, | Performed by: NURSE PRACTITIONER

## 2023-10-25 PROCEDURE — 87811 SARS-COV-2 COVID19 W/OPTIC: CPT | Mod: QW,S$GLB,, | Performed by: NURSE PRACTITIONER

## 2023-10-25 PROCEDURE — 1159F MED LIST DOCD IN RCRD: CPT | Mod: CPTII,S$GLB,, | Performed by: NURSE PRACTITIONER

## 2023-10-25 PROCEDURE — 99214 PR OFFICE/OUTPT VISIT, EST, LEVL IV, 30-39 MIN: ICD-10-PCS | Mod: S$GLB,,, | Performed by: NURSE PRACTITIONER

## 2023-10-25 PROCEDURE — 3044F PR MOST RECENT HEMOGLOBIN A1C LEVEL <7.0%: ICD-10-PCS | Mod: CPTII,S$GLB,, | Performed by: NURSE PRACTITIONER

## 2023-10-25 PROCEDURE — 99999 PR PBB SHADOW E&M-EST. PATIENT-LVL IV: CPT | Mod: PBBFAC,,, | Performed by: NURSE PRACTITIONER

## 2023-10-25 PROCEDURE — 99214 OFFICE O/P EST MOD 30 MIN: CPT | Mod: S$GLB,,, | Performed by: NURSE PRACTITIONER

## 2023-10-25 PROCEDURE — 3008F PR BODY MASS INDEX (BMI) DOCUMENTED: ICD-10-PCS | Mod: CPTII,S$GLB,, | Performed by: NURSE PRACTITIONER

## 2023-10-25 PROCEDURE — 3078F PR MOST RECENT DIASTOLIC BLOOD PRESSURE < 80 MM HG: ICD-10-PCS | Mod: CPTII,S$GLB,, | Performed by: NURSE PRACTITIONER

## 2023-10-25 PROCEDURE — 3074F PR MOST RECENT SYSTOLIC BLOOD PRESSURE < 130 MM HG: ICD-10-PCS | Mod: CPTII,S$GLB,, | Performed by: NURSE PRACTITIONER

## 2023-10-25 PROCEDURE — 99999 PR PBB SHADOW E&M-EST. PATIENT-LVL IV: ICD-10-PCS | Mod: PBBFAC,,, | Performed by: NURSE PRACTITIONER

## 2023-10-25 PROCEDURE — 87651 POCT STREP A MOLECULAR: ICD-10-PCS | Mod: QW,S$GLB,, | Performed by: NURSE PRACTITIONER

## 2023-10-25 RX ORDER — ACETAMINOPHEN AND CODEINE PHOSPHATE 300; 60 MG/1; MG/1
1 TABLET ORAL EVERY 8 HOURS PRN
Qty: 2 TABLET | Refills: 0 | Status: SHIPPED | OUTPATIENT
Start: 2023-10-25 | End: 2023-10-29

## 2023-10-25 RX ORDER — LACTULOSE 10 G/15ML
20 SOLUTION ORAL 2 TIMES DAILY PRN
Qty: 240 ML | Refills: 0 | Status: SHIPPED | OUTPATIENT
Start: 2023-10-25 | End: 2023-11-28 | Stop reason: SDUPTHER

## 2023-10-25 NOTE — PATIENT INSTRUCTIONS
Supportive care/Symptomatic therapy suggested: rest, increase fluids ( avoid caffeine) , gargle with warm salt water as needed for sore throat,   Alternate OTC acetaminophen ( Tylenol) , ibuprofen, if no allergies.   Apply heat to sinuses as needed for sinus pressure, warm showers, use mist or vaporizer as needed,   For post nasal drip or allergies try an Antihistamine, such as loratadine( claritin) daily or zyrtec at night  For cough use cough suppressant of choice; delsym every 12 hours, mucinex DM or Robitussin  Call or return to clinic prn if these symptoms worsen or fail to improve as anticipated within 3 days

## 2023-10-25 NOTE — PROGRESS NOTES
Subjective:           Patient ID: Kathy Beltrán is a 28 y.o. female.    Chief Complaint: Sore Throat and Fever    Kathy Beltrán is a 28 y.o. female here with c/o sore throat  Seen at urgent care with c/o sore throat; tx with zithromax, day 3 today ; PCN allergy   No fever just not feeling better    Also c/o chronic constipation since child valerio; states she used to be prescribed lactulose as a child   No relief with metamucil or miralax   Has been several days since having a Bowel movment   Her mother takes linzess        Sore Throat   Pertinent negatives include no abdominal pain, congestion, coughing, diarrhea, ear pain, headaches, shortness of breath or vomiting.   Fever   Associated symptoms include a sore throat. Pertinent negatives include no abdominal pain, congestion, coughing, diarrhea, ear pain, headaches, nausea or vomiting.     Review of Systems   Constitutional:  Positive for fever. Negative for chills and fatigue.   HENT:  Positive for sore throat. Negative for congestion, ear pain, postnasal drip, sinus pressure and sneezing.    Eyes:  Negative for discharge.   Respiratory:  Negative for cough, chest tightness and shortness of breath.    Cardiovascular: Negative.    Gastrointestinal:  Negative for abdominal pain, constipation, diarrhea, nausea and vomiting.   Genitourinary:  Negative for difficulty urinating, flank pain and hematuria.   Musculoskeletal: Negative.  Negative for arthralgias and joint swelling.   Skin: Negative.    Neurological:  Negative for dizziness and headaches.   Psychiatric/Behavioral:  Negative for behavioral problems and confusion.        Objective:      Physical Exam  Vitals and nursing note reviewed.   Constitutional:       General: She is not in acute distress.     Appearance: She is well-developed. She is obese.   HENT:      Head: Normocephalic and atraumatic.      Right Ear: External ear normal.      Left Ear: External ear normal.      Nose: Nose normal.       Mouth/Throat:      Mouth: No oral lesions.      Pharynx: Posterior oropharyngeal erythema present.   Eyes:      General:         Right eye: No discharge.         Left eye: No discharge.      Conjunctiva/sclera: Conjunctivae normal.   Neck:      Thyroid: No thyromegaly.   Cardiovascular:      Rate and Rhythm: Normal rate and regular rhythm.   Pulmonary:      Effort: Pulmonary effort is normal. No respiratory distress.      Breath sounds: No stridor. No wheezing, rhonchi or rales.   Chest:      Chest wall: No tenderness.   Abdominal:      General: There is distension.      Tenderness: There is no right CVA tenderness, left CVA tenderness, guarding or rebound.      Hernia: No hernia is present.   Lymphadenopathy:      Cervical: Cervical adenopathy present.   Skin:     General: Skin is warm and dry.   Neurological:      Mental Status: She is alert and oriented to person, place, and time.   Psychiatric:         Behavior: Behavior normal.         Thought Content: Thought content normal.         Assessment:       1. Strep pharyngitis    2. Sore throat    3. Fever, unspecified fever cause    4. Chronic constipation    5. Irritable bowel syndrome with constipation        Plan:   1. Strep pharyngitis  Comments:  day 3 zithromax      2. Sore throat  -     POCT Strep A, Molecular  -     SARS Coronavirus 2 Antigen, POCT Manual Read    3. Fever, unspecified fever cause  -     POCT Strep A, Molecular  -     SARS Coronavirus 2 Antigen, POCT Manual Read    4. Chronic constipation  -     lactulose (CHRONULAC) 20 gram/30 mL Soln; Take 30 mLs (20 g total) by mouth 2 (two) times daily as needed (as needed for consipation , if no BM in 2-3 days).  Dispense: 240 mL; Refill: 0  -     linaCLOtide (LINZESS) 145 mcg Cap capsule; Take 1 capsule (145 mcg total) by mouth before breakfast. 30 minutes before food in am  Dispense: 30 capsule; Refill: 0    5. Irritable bowel syndrome with constipation  -     linaCLOtide (LINZESS) 145 mcg Cap  capsule; Take 1 capsule (145 mcg total) by mouth before breakfast. 30 minutes before food in am  Dispense: 30 capsule; Refill: 0    Other orders  -     acetaminophen-codeine 300-60mg (TYLENOL #4) 300-60 mg Tab; Take 1 tablet by mouth every 8 (eight) hours as needed (severe sore throat).  Dispense: 2 tablet; Refill: 0       Discussed supportive care also

## 2023-11-01 DIAGNOSIS — F50.81 BINGE EATING DISORDER: ICD-10-CM

## 2023-11-01 NOTE — TELEPHONE ENCOUNTER
No care due was identified.  [facility] Embedded Care Due Messages. Reference number: [id number].   11/01/2023 6:01:46 PM CDT

## 2023-11-01 NOTE — TELEPHONE ENCOUNTER
Refill Routing Note   Medication(s) are not appropriate for processing by [facility] for the following reason(s):      Medication outside of protocol    [facility] action(s):  Route Care Due:  None identified              Appointments  past 12m or future 3m with PCP    Date Provider   Last Visit   10/4/2023 Georgina Lora MD   Next Visit   11/29/2023 Georgina Lora MD   ED visits in past 90 days: 0        Note composed:6:16 PM 11/01/2023

## 2023-11-02 RX ORDER — LISDEXAMFETAMINE DIMESYLATE 30 MG/1
30 CAPSULE ORAL EVERY MORNING
Qty: 30 CAPSULE | Refills: 0 | Status: SHIPPED | OUTPATIENT
Start: 2023-11-02 | End: 2023-11-24 | Stop reason: SDUPTHER

## 2023-11-18 ENCOUNTER — PATIENT MESSAGE (OUTPATIENT)
Dept: INTERNAL MEDICINE | Facility: CLINIC | Age: 28
End: 2023-11-18
Payer: COMMERCIAL

## 2023-11-18 DIAGNOSIS — E66.09 CLASS 2 OBESITY DUE TO EXCESS CALORIES WITHOUT SERIOUS COMORBIDITY WITH BODY MASS INDEX (BMI) OF 38.0 TO 38.9 IN ADULT: Primary | ICD-10-CM

## 2023-11-18 DIAGNOSIS — F32.5 MAJOR DEPRESSIVE DISORDER WITH SINGLE EPISODE, IN FULL REMISSION: ICD-10-CM

## 2023-11-20 NOTE — TELEPHONE ENCOUNTER
[facility] FOLLOW-UP VISIT      PCP:  Danyel Garrido MD    Chief Complaint   Patient presents with   • Pain     Left side of Neck and upper back  current 7, worst- 10, least-3 , average- 5, acceptable 0   • Office Visit       INTERVAL:  Patient presents for follow-up of chronic neck pain that radiates to her upper back, both shoulders. Her pain is intermittent but occurs daily. It is described 5/10 VAS on average, 10/10 VAS at its worst and 0/10 VAS as tolerable. The pain is aggravated by activities of daily living, getting dressed and alleviated by sitting, rest.     Since her last visit, Anjana has undergone a cervical epidural steroid injection at C6-C7 which provided no meaningful relief of her chronic neck pain. Historically, she underwent a bilateral cervical paraspinal, trapezius, rhomboid trigger point injections which provided nearly 100% relief of her chronic neck pain for one day before her pain returned to baseline. This trigger point injection was repeated today.     Regarding her oncology history, she continues to follow-up with [facility]. She is scheduled for a monitoring CT chest abdomen in the coming months.     She is also taking Gabapentin 300 mg QHS for neuropathy.     HPI (from initial visit):   Anjana Blandon is a 89 year old female with chronic neck pain that radiates to her bilateral shoulders as described below.  Referred by Danyel Garrido MD for consultation and management.    Pain Score (0-10):  Current 9/10; Worst 10/10;  Least 9/10;  Average 9/10;  Acceptable 0/10  Duration:  >2 years  Context of pain:  Pain started insidiously without event or trauma that the patient can recall. She notes her pain is worse in the morning. She presents with her niece (Katia) who reports a recent fall from standing one week ago. She uses a cane and/or walker to ambulate.   Location: Neck  Radiation:  Bilateral trapezius, shoulders  Quality: aching  Improves:   Can you order labs for patient?   Sitting, cervical flexion  Exacerbates:? activities of daily living, getting dressed      Numbness/Tingling:  none  Weakness:  none  Sleep:  interrupted  Mood:  frustrated   ?  SOCIAL HISTORY:  - Alcohol Abuse: No  - Substance Abuse: No    INTERVENTIONS:  1. Injections:    · 2-3/2022 - Bilateral cervical MBB at C3, C4, C5 - 100% relief  · 3/17/22 - Left cervical RFL at C3, C4, C5  · 4/20/22 - right cervical RFL at C3, C4, C5  · 4/29/22 - bilateral cervical paraspinal, trapezius, rhomboid trigger point injections  · 5/16/22 - bilateral cervical paraspinal, trapezius, rhomboid trigger point injections  · 7/27/22 - cervical epidural steroid injection C6-C7 - 0% relief   2. Physical Therapy: 6 weeks of PT/OT at [facility] in 2022.   3. Surgeries (Spine, joint, related to pain):   · None  4. Medications (pain/mood/depression): (with doses, duration of use, side effects, etc...)  Current  · Gabapentin 300 mg QHS  · Prednisone 10 mg every other day - bullous pemphigoid   · Tylenol PRN  Previous  • Cymbalta 30 mg - mental grogginess    PHYSICAL EXAMINATION:  Constitutional:  NAD  Psychiatric:  Alert, awake, and oriented   Gait: antalgic, forward-flexion, using cane.     IMAGING STUDIES (Radiologist reads pulled forward into note below):  Results for orders placed in visit on 10/05/20    XR CERVICAL SPINE 4-5 VIEWS    Narrative  XR CERVICAL SPINE 4-5 VIEWS    CLINICAL INDICATION:  89 years-old Female with presenting history of  Cervicalgia.    COMPARISON:  5/24/2006.    TECHNIQUE:  AP/lateral/odontoid views of the cervical spine.      FINDINGS /    Impression  There is slight anterolisthesis of C4 on C5. Disc osteophyte complex and  loss of disc height is seen at C5-C6 and C6 -C7. There is also slightly  performed change at C5-C6. Bony fragment is seen at C4-C5 disc space  anteriorly. The rest of the prevertebral soft tissue is unremarkable. No  acute fracture or joint dislocation. There is probable mild  bilateral  neural foramen narrowing at C5-C6 and C6-C7.    CONCLUSIONS: Moderate degenerative change at C5-C6 and C6-C7.    Results for orders placed during the hospital encounter of 06/28/22    MRI CERVICAL SPINE WO CONTRAST    Narrative  EXAM:  MRI CERVICAL SPINE WO CONTRAST    CLINICAL INDICATION: 89 years-old Female, presenting history of cervical  radiculitis.    COMPARISON:  5/24/2006. X-rays February 7, 2022    TECHNIQUE:  Using a 1.5 Leelee Siemens Magnetom Aera  imaging system, MRI of  the cervical spine is performed, utilizing axial and sagittal T1WI, T2WI,  and STIR, axial MERGE and T2WI acquisitions without gadolinium.    FINDINGS:    Postsurgical Findings:  None.    Alignment: Normal.    Bone Marrow: Normal marrow signal.    Vertebral Bodies: Uniform in height.    Intervertebral Discs: Moderate narrowing C5-6 and C6-7    Spinal Cord: Normal signal.    Craniocervical junction: Unremarkable.    Axial images demonstrate:    C1-2: Unremarkable.    C2-C3:  Disks: Normal disc.  Central stenosis: No central stenosis.  Foramina: Patent.  Foraminal stenosis: No foraminal stenosis.    C3-C4:  Disks: Posterior disc osteophyte complex mildly effaces the anterior CSF  signal.  Central stenosis: No central stenosis.  Foramina: Uncovertebral hypertrophy encroaches upon the foramina  bilaterally.  Foraminal stenosis: Mild bilateral foraminal stenosis.    C4-C5:  Disks: Posterior disc osteophyte complex mildly effaces the anterior CSF  signal.  Central stenosis: No central stenosis.  Foramina: Uncovertebral hypertrophy encroaches upon the foramina  bilaterally.  Foraminal stenosis: Mild bilateral foraminal stenosis.    C5-C6:  Disks: Left lateralized posterior disc osteophyte complex effaces the  anterior CSF signal.  Central stenosis: No central stenosis.  There is partial obscuration of the  ventral left C6 nerve.  Foramina: Uncovertebral hypertrophy encroaches upon left foramen.  Foraminal stenosis: Moderate left  foraminal stenosis.    C6-C7:  Disks: Posterior disc osteophyte complex mildly effaces the anterior CSF  signal.  Central stenosis: No central stenosis.  Foramina: Patent.  Foraminal stenosis: No foraminal stenosis.    C7-T1:  Disks: Normal disc.  Central stenosis: No central stenosis.  Foramina: Patent.  Foraminal stenosis: No foraminal stenosis.    Impression  1.  C3-7 variable spondylosis and uncovertebral hypertrophy.  2.  No significant central stenosis.  3.  Left lateralized posterior disc osteophyte complex at C5-6 partially  obscures the ventral left C6 nerve.  4.  Foraminal stenosis is moderate on the left at C5-6, and mild  bilaterally at C3-4 and C4-5.  5.  Normal cord signal.       ASSESSMENT AND PLAN:  Anjana Blandon is a 89 year old female with chronic neck pain that radiates to her bilateral trapezius/shoulders. This pain started insidiously without event or trauma that the patient can recall a few years ago. She has been managing this pain conservatively with use of tylenol, in-home PT, and activity modification. She now lives at [facility] and requires help with many activities of daily living due to chronic neck pain.     Given that she has failed to improve following cervical radiofrequency ablation and cervical epidural steroid injection, I discussed possible next steps including targeted drug delivery vs neuromodulation. I would prefer to proceed with an spinal cord stimulator trial before targeted drug delivery given her poor response to medication management in the past and age. She would like to defer this intervention at this time in favor of conservative management.     Based on today's evaluation, I informed patient that the likely generators of her pain are cervical radiculitis and myofascial.  We discussed the risks and benefits of medication management and interventional treatment.  Our multimodal treatment options are outlined below.    Of note, she is on Ketruda for  stage IV colon cancer. She recently underwent a third colonic resection on 12/27/21.     Diagnosis:   1. Myofascial pain    2. Cervical radiculitis    3. Cervical spondylosis        Plan:  1. Medications:  · Taking Gabapentin 300 mg QHS  · Taking Prednisone 10 mg every other day.   · Avoid medication management given side-effects with Cymbalta.   2. Diagnostics:    • None indicated at this time.   3. Interventions:    • Performed left cervical paraspinal, trapezius, rhomboid trigger point injection today in office. May repeat as needed in the future.   4. Devices:    • None indicated at this time.  5. Consults: None indicated at this time.  6. Physical Therapy: Recommend ongoing activity as tolerated.   7. Opioid Risk Assessment:  · Pain psych assessment:  N/A  • Urine tox screen: None  • Prescription Drug Monitoring Program verified this visit.  • Opioid contract: No  · Discussed with patient the risks/benefits of opioids.  8. Follow-up for as needed.    Tejas Hamilton MD, D. NESTOR  Interventional Pain Medicine  [facility] [place]    Justification for interventional therapy:  · Patient with average pain > 6/10  · Patient has exhausted conservative therapy    The risks, consequences, alternatives, and benefits of various treatment options were discussed with the patient in great detail including conservative management, injections and procedures.    Portions of this note were pulled forward from the previous office visit, reviewed and edited as appropriate.     On 9/30/2022, Nga MOORE scribed the services personally performed by Tejas Hamilton MD.    The documentation recorded by the scribe accurately and completely reflects the service(s) I personally performed and the decisions made by me.

## 2023-11-24 ENCOUNTER — LAB VISIT (OUTPATIENT)
Dept: LAB | Facility: HOSPITAL | Age: 28
End: 2023-11-24
Attending: INTERNAL MEDICINE
Payer: COMMERCIAL

## 2023-11-24 DIAGNOSIS — E66.09 CLASS 2 OBESITY DUE TO EXCESS CALORIES WITHOUT SERIOUS COMORBIDITY WITH BODY MASS INDEX (BMI) OF 38.0 TO 38.9 IN ADULT: ICD-10-CM

## 2023-11-24 DIAGNOSIS — F50.81 BINGE EATING DISORDER: ICD-10-CM

## 2023-11-24 DIAGNOSIS — F32.5 MAJOR DEPRESSIVE DISORDER WITH SINGLE EPISODE, IN FULL REMISSION: ICD-10-CM

## 2023-11-24 LAB
25(OH)D3+25(OH)D2 SERPL-MCNC: 38 NG/ML (ref 30–96)
ALBUMIN SERPL BCP-MCNC: 4 G/DL (ref 3.5–5.2)
ALP SERPL-CCNC: 68 U/L (ref 55–135)
ALT SERPL W/O P-5'-P-CCNC: 12 U/L (ref 10–44)
ANION GAP SERPL CALC-SCNC: 7 MMOL/L (ref 8–16)
AST SERPL-CCNC: 13 U/L (ref 10–40)
BASOPHILS # BLD AUTO: 0.02 K/UL (ref 0–0.2)
BASOPHILS NFR BLD: 0.3 % (ref 0–1.9)
BILIRUB SERPL-MCNC: 0.3 MG/DL (ref 0.1–1)
BUN SERPL-MCNC: 10 MG/DL (ref 6–20)
CALCIUM SERPL-MCNC: 9.4 MG/DL (ref 8.7–10.5)
CHLORIDE SERPL-SCNC: 105 MMOL/L (ref 95–110)
CHOLEST SERPL-MCNC: 139 MG/DL (ref 120–199)
CHOLEST/HDLC SERPL: 2.7 {RATIO} (ref 2–5)
CO2 SERPL-SCNC: 27 MMOL/L (ref 23–29)
CREAT SERPL-MCNC: 0.8 MG/DL (ref 0.5–1.4)
DIFFERENTIAL METHOD: NORMAL
EOSINOPHIL # BLD AUTO: 0.2 K/UL (ref 0–0.5)
EOSINOPHIL NFR BLD: 2.2 % (ref 0–8)
ERYTHROCYTE [DISTWIDTH] IN BLOOD BY AUTOMATED COUNT: 13.2 % (ref 11.5–14.5)
EST. GFR  (NO RACE VARIABLE): >60 ML/MIN/1.73 M^2
ESTIMATED AVG GLUCOSE: 91 MG/DL (ref 68–131)
GLUCOSE SERPL-MCNC: 80 MG/DL (ref 70–110)
HBA1C MFR BLD: 4.8 % (ref 4–5.6)
HCT VFR BLD AUTO: 41.2 % (ref 37–48.5)
HDLC SERPL-MCNC: 52 MG/DL (ref 40–75)
HDLC SERPL: 37.4 % (ref 20–50)
HGB BLD-MCNC: 13.9 G/DL (ref 12–16)
IMM GRANULOCYTES # BLD AUTO: 0.02 K/UL (ref 0–0.04)
IMM GRANULOCYTES NFR BLD AUTO: 0.3 % (ref 0–0.5)
LDLC SERPL CALC-MCNC: 74.4 MG/DL (ref 63–159)
LYMPHOCYTES # BLD AUTO: 2.1 K/UL (ref 1–4.8)
LYMPHOCYTES NFR BLD: 30.7 % (ref 18–48)
MCH RBC QN AUTO: 29.1 PG (ref 27–31)
MCHC RBC AUTO-ENTMCNC: 33.7 G/DL (ref 32–36)
MCV RBC AUTO: 86 FL (ref 82–98)
MONOCYTES # BLD AUTO: 0.4 K/UL (ref 0.3–1)
MONOCYTES NFR BLD: 6.4 % (ref 4–15)
NEUTROPHILS # BLD AUTO: 4.1 K/UL (ref 1.8–7.7)
NEUTROPHILS NFR BLD: 60.1 % (ref 38–73)
NONHDLC SERPL-MCNC: 87 MG/DL
NRBC BLD-RTO: 0 /100 WBC
PLATELET # BLD AUTO: 272 K/UL (ref 150–450)
PMV BLD AUTO: 9.4 FL (ref 9.2–12.9)
POTASSIUM SERPL-SCNC: 4.1 MMOL/L (ref 3.5–5.1)
PROT SERPL-MCNC: 7.6 G/DL (ref 6–8.4)
RBC # BLD AUTO: 4.78 M/UL (ref 4–5.4)
SODIUM SERPL-SCNC: 139 MMOL/L (ref 136–145)
T4 FREE SERPL-MCNC: 1 NG/DL (ref 0.71–1.51)
TRIGL SERPL-MCNC: 63 MG/DL (ref 30–150)
TSH SERPL DL<=0.005 MIU/L-ACNC: 1.6 UIU/ML (ref 0.4–4)
WBC # BLD AUTO: 6.84 K/UL (ref 3.9–12.7)

## 2023-11-24 PROCEDURE — 85025 COMPLETE CBC W/AUTO DIFF WBC: CPT | Performed by: INTERNAL MEDICINE

## 2023-11-24 PROCEDURE — 83036 HEMOGLOBIN GLYCOSYLATED A1C: CPT | Performed by: INTERNAL MEDICINE

## 2023-11-24 PROCEDURE — 36415 COLL VENOUS BLD VENIPUNCTURE: CPT | Performed by: INTERNAL MEDICINE

## 2023-11-24 PROCEDURE — 84443 ASSAY THYROID STIM HORMONE: CPT | Performed by: INTERNAL MEDICINE

## 2023-11-24 PROCEDURE — 84439 ASSAY OF FREE THYROXINE: CPT | Performed by: INTERNAL MEDICINE

## 2023-11-24 PROCEDURE — 80053 COMPREHEN METABOLIC PANEL: CPT | Performed by: INTERNAL MEDICINE

## 2023-11-24 PROCEDURE — 82306 VITAMIN D 25 HYDROXY: CPT | Performed by: INTERNAL MEDICINE

## 2023-11-24 PROCEDURE — 80061 LIPID PANEL: CPT | Performed by: INTERNAL MEDICINE

## 2023-11-25 NOTE — TELEPHONE ENCOUNTER
No care due was identified.  [facility] Embedded Care Due Messages. Reference number: [id number].   11/24/2023 8:15:37 PM CST

## 2023-11-27 RX ORDER — LISDEXAMFETAMINE DIMESYLATE 30 MG/1
30 CAPSULE ORAL EVERY MORNING
Qty: 30 CAPSULE | Refills: 0 | Status: SHIPPED | OUTPATIENT
Start: 2023-11-27 | End: 2023-12-22 | Stop reason: SDUPTHER

## 2023-11-28 ENCOUNTER — OFFICE VISIT (OUTPATIENT)
Dept: INTERNAL MEDICINE | Facility: CLINIC | Age: 28
End: 2023-11-28
Payer: COMMERCIAL

## 2023-11-28 ENCOUNTER — OFFICE VISIT (OUTPATIENT)
Dept: OBSTETRICS AND GYNECOLOGY | Facility: CLINIC | Age: 28
End: 2023-11-28
Payer: COMMERCIAL

## 2023-11-28 VITALS
HEIGHT: 68 IN | BODY MASS INDEX: 37.62 KG/M2 | SYSTOLIC BLOOD PRESSURE: 120 MMHG | RESPIRATION RATE: 16 BRPM | HEART RATE: 80 BPM | WEIGHT: 248.25 LBS | DIASTOLIC BLOOD PRESSURE: 80 MMHG | OXYGEN SATURATION: 98 %

## 2023-11-28 VITALS
HEIGHT: 68 IN | SYSTOLIC BLOOD PRESSURE: 116 MMHG | HEART RATE: 84 BPM | OXYGEN SATURATION: 99 % | BODY MASS INDEX: 37.76 KG/M2 | RESPIRATION RATE: 16 BRPM | WEIGHT: 249.13 LBS | DIASTOLIC BLOOD PRESSURE: 80 MMHG

## 2023-11-28 DIAGNOSIS — F32.5 MAJOR DEPRESSIVE DISORDER WITH SINGLE EPISODE, IN FULL REMISSION: ICD-10-CM

## 2023-11-28 DIAGNOSIS — Z30.433 ENCOUNTER FOR REMOVAL AND REINSERTION OF INTRAUTERINE CONTRACEPTIVE DEVICE (IUD): ICD-10-CM

## 2023-11-28 DIAGNOSIS — K59.09 CHRONIC CONSTIPATION: ICD-10-CM

## 2023-11-28 DIAGNOSIS — E66.09 CLASS 2 OBESITY DUE TO EXCESS CALORIES WITHOUT SERIOUS COMORBIDITY WITH BODY MASS INDEX (BMI) OF 37.0 TO 37.9 IN ADULT: Primary | ICD-10-CM

## 2023-11-28 DIAGNOSIS — K58.1 IRRITABLE BOWEL SYNDROME WITH CONSTIPATION: ICD-10-CM

## 2023-11-28 DIAGNOSIS — G89.29 CHRONIC PELVIC PAIN IN FEMALE: Primary | ICD-10-CM

## 2023-11-28 DIAGNOSIS — R10.2 CHRONIC PELVIC PAIN IN FEMALE: Primary | ICD-10-CM

## 2023-11-28 PROCEDURE — 99999 PR PBB SHADOW E&M-EST. PATIENT-LVL IV: CPT | Mod: PBBFAC,,, | Performed by: INTERNAL MEDICINE

## 2023-11-28 PROCEDURE — 3008F BODY MASS INDEX DOCD: CPT | Mod: CPTII,S$GLB,, | Performed by: INTERNAL MEDICINE

## 2023-11-28 PROCEDURE — 3079F DIAST BP 80-89 MM HG: CPT | Mod: CPTII,S$GLB,, | Performed by: OBSTETRICS & GYNECOLOGY

## 2023-11-28 PROCEDURE — 3008F BODY MASS INDEX DOCD: CPT | Mod: CPTII,S$GLB,, | Performed by: OBSTETRICS & GYNECOLOGY

## 2023-11-28 PROCEDURE — 3074F SYST BP LT 130 MM HG: CPT | Mod: CPTII,S$GLB,, | Performed by: INTERNAL MEDICINE

## 2023-11-28 PROCEDURE — 99213 PR OFFICE/OUTPT VISIT, EST, LEVL III, 20-29 MIN: ICD-10-PCS | Mod: S$GLB,,, | Performed by: OBSTETRICS & GYNECOLOGY

## 2023-11-28 PROCEDURE — 99213 PR OFFICE/OUTPT VISIT, EST, LEVL III, 20-29 MIN: ICD-10-PCS | Mod: S$GLB,,, | Performed by: INTERNAL MEDICINE

## 2023-11-28 PROCEDURE — 99999 PR PBB SHADOW E&M-EST. PATIENT-LVL V: CPT | Mod: PBBFAC,,, | Performed by: OBSTETRICS & GYNECOLOGY

## 2023-11-28 PROCEDURE — 3044F HG A1C LEVEL LT 7.0%: CPT | Mod: CPTII,S$GLB,, | Performed by: OBSTETRICS & GYNECOLOGY

## 2023-11-28 PROCEDURE — 1159F PR MEDICATION LIST DOCUMENTED IN MEDICAL RECORD: ICD-10-PCS | Mod: CPTII,S$GLB,, | Performed by: INTERNAL MEDICINE

## 2023-11-28 PROCEDURE — 3044F HG A1C LEVEL LT 7.0%: CPT | Mod: CPTII,S$GLB,, | Performed by: INTERNAL MEDICINE

## 2023-11-28 PROCEDURE — 99213 OFFICE O/P EST LOW 20 MIN: CPT | Mod: S$GLB,,, | Performed by: INTERNAL MEDICINE

## 2023-11-28 PROCEDURE — 3079F DIAST BP 80-89 MM HG: CPT | Mod: CPTII,S$GLB,, | Performed by: INTERNAL MEDICINE

## 2023-11-28 PROCEDURE — 87086 URINE CULTURE/COLONY COUNT: CPT | Performed by: OBSTETRICS & GYNECOLOGY

## 2023-11-28 PROCEDURE — 1160F RVW MEDS BY RX/DR IN RCRD: CPT | Mod: CPTII,S$GLB,, | Performed by: INTERNAL MEDICINE

## 2023-11-28 PROCEDURE — 99213 OFFICE O/P EST LOW 20 MIN: CPT | Mod: S$GLB,,, | Performed by: OBSTETRICS & GYNECOLOGY

## 2023-11-28 PROCEDURE — 1160F PR REVIEW ALL MEDS BY PRESCRIBER/CLIN PHARMACIST DOCUMENTED: ICD-10-PCS | Mod: CPTII,S$GLB,, | Performed by: INTERNAL MEDICINE

## 2023-11-28 PROCEDURE — 3044F PR MOST RECENT HEMOGLOBIN A1C LEVEL <7.0%: ICD-10-PCS | Mod: CPTII,S$GLB,, | Performed by: OBSTETRICS & GYNECOLOGY

## 2023-11-28 PROCEDURE — 3074F SYST BP LT 130 MM HG: CPT | Mod: CPTII,S$GLB,, | Performed by: OBSTETRICS & GYNECOLOGY

## 2023-11-28 PROCEDURE — 3079F PR MOST RECENT DIASTOLIC BLOOD PRESSURE 80-89 MM HG: ICD-10-PCS | Mod: CPTII,S$GLB,, | Performed by: INTERNAL MEDICINE

## 2023-11-28 PROCEDURE — 3044F PR MOST RECENT HEMOGLOBIN A1C LEVEL <7.0%: ICD-10-PCS | Mod: CPTII,S$GLB,, | Performed by: INTERNAL MEDICINE

## 2023-11-28 PROCEDURE — 1159F MED LIST DOCD IN RCRD: CPT | Mod: CPTII,S$GLB,, | Performed by: INTERNAL MEDICINE

## 2023-11-28 PROCEDURE — 3008F PR BODY MASS INDEX (BMI) DOCUMENTED: ICD-10-PCS | Mod: CPTII,S$GLB,, | Performed by: INTERNAL MEDICINE

## 2023-11-28 PROCEDURE — 1159F PR MEDICATION LIST DOCUMENTED IN MEDICAL RECORD: ICD-10-PCS | Mod: CPTII,S$GLB,, | Performed by: OBSTETRICS & GYNECOLOGY

## 2023-11-28 PROCEDURE — 3074F PR MOST RECENT SYSTOLIC BLOOD PRESSURE < 130 MM HG: ICD-10-PCS | Mod: CPTII,S$GLB,, | Performed by: INTERNAL MEDICINE

## 2023-11-28 PROCEDURE — 3074F PR MOST RECENT SYSTOLIC BLOOD PRESSURE < 130 MM HG: ICD-10-PCS | Mod: CPTII,S$GLB,, | Performed by: OBSTETRICS & GYNECOLOGY

## 2023-11-28 PROCEDURE — 1159F MED LIST DOCD IN RCRD: CPT | Mod: CPTII,S$GLB,, | Performed by: OBSTETRICS & GYNECOLOGY

## 2023-11-28 PROCEDURE — 3008F PR BODY MASS INDEX (BMI) DOCUMENTED: ICD-10-PCS | Mod: CPTII,S$GLB,, | Performed by: OBSTETRICS & GYNECOLOGY

## 2023-11-28 PROCEDURE — 99999 PR PBB SHADOW E&M-EST. PATIENT-LVL V: ICD-10-PCS | Mod: PBBFAC,,, | Performed by: OBSTETRICS & GYNECOLOGY

## 2023-11-28 PROCEDURE — 3079F PR MOST RECENT DIASTOLIC BLOOD PRESSURE 80-89 MM HG: ICD-10-PCS | Mod: CPTII,S$GLB,, | Performed by: OBSTETRICS & GYNECOLOGY

## 2023-11-28 PROCEDURE — 99999 PR PBB SHADOW E&M-EST. PATIENT-LVL IV: ICD-10-PCS | Mod: PBBFAC,,, | Performed by: INTERNAL MEDICINE

## 2023-11-28 RX ORDER — LACTULOSE 10 G/15ML
20 SOLUTION ORAL 2 TIMES DAILY PRN
Qty: 240 ML | Refills: 0 | Status: SHIPPED | OUTPATIENT
Start: 2023-11-28 | End: 2023-12-22 | Stop reason: SDUPTHER

## 2023-11-28 RX ORDER — CLINDAMYCIN PHOSPHATE 11.9 MG/ML
SOLUTION TOPICAL
COMMUNITY
Start: 2023-11-26

## 2023-11-28 RX ORDER — DOXYCYCLINE 100 MG/1
100 CAPSULE ORAL DAILY
COMMUNITY
Start: 2023-11-26

## 2023-11-28 NOTE — PROGRESS NOTES
Subjective:       Patient ID: Kathy Beltrán is a 28 y.o. female.    Chief Complaint: Follow-up      HPI:  Patient is known to me and presents for follow up weight management. She has obesity, MDD and irritable bowel with chronic constipation. Labs from 11/24/23 personally reviewed, interperted and discussed today.     A1C 4.8%, normal  LDL 74, normal  GFR > 60, normal  TSH 1.5, normal  Vit D 38, normal    She was on Mounjaro and at last visit had reported she lost 50 pounds in 6 months.   Has been taking 7.5mg weekly since Nov 2022. Denies medication side effects.   We planned to increase dose to 10mg weekly last visit but cost has increased to $500/month. No significant weight loss since last visit.  Has struggled with obesity most of her adult life and never had this much success.   Denies personal or FH of thyroid cancer, pancreatic cancer, pancreatitis.    Wegovy not available. Referred for bariatric surgery but not covered as BMI not > 39.    Last visit we started Vyvanse for binge-eating disorder. She is doing well on current dose. Weight down about 10 pounds since 10/4/23 averaging 1lb per week.      Feb 2023 started on protonix. Saw GI and sx are improved and has weaned off since last visit.      Depresison: on lamictal and wellbutrin. Working well. Follows with psychiatry.     Irritable bowel with constipation: on linzess. Working ok.  Previously BM every 7-10 day  Now BM twice a week    PAP-establishedwith Dr. Kelly     Tobacco use: denies use  EtOH use: juaquin      History reviewed. No pertinent past medical history.    Family History   Problem Relation Age of Onset    Hypertension Mother     Heart disease Mother     Asthma Mother     Hypertension Father     Heart disease Father     Hypertension Maternal Grandmother     Heart disease Maternal Grandmother     Thyroid disease Maternal Grandmother     Hypertension Maternal Grandfather     Heart disease Maternal Grandfather     Asthma Sister        Social  History     Socioeconomic History    Marital status:    Tobacco Use    Smoking status: Never     Passive exposure: Never    Smokeless tobacco: Never   Substance and Sexual Activity    Alcohol use: Yes     Comment: Social drinker    Drug use: Never    Sexual activity: Yes     Partners: Male     Birth control/protection: I.U.D.     Social Determinants of Health     Financial Resource Strain: Unknown (11/27/2023)    Overall Financial Resource Strain (CARDIA)     Difficulty of Paying Living Expenses: Patient refused   Food Insecurity: Unknown (11/27/2023)    Hunger Vital Sign     Worried About Running Out of Food in the Last Year: Patient refused     Ran Out of Food in the Last Year: Patient refused   Transportation Needs: No Transportation Needs (11/27/2023)    PRAPARE - Transportation     Lack of Transportation (Medical): No     Lack of Transportation (Non-Medical): No   Physical Activity: Unknown (11/27/2023)    Exercise Vital Sign     Days of Exercise per Week: Patient refused   Stress: Unknown (11/27/2023)    [employer] - Occupational Stress Questionnaire     Feeling of Stress : Patient refused   Social Connections: Unknown (11/27/2023)    Social Connection and Isolation Panel [NHANES]     Frequency of Communication with Friends and Family: More than three times a week     Frequency of Social Gatherings with Friends and Family: More than three times a week     Active Member of Clubs or Organizations: No     Attends Club or Organization Meetings: Patient refused     Marital Status:    Housing Stability: Low Risk  (11/27/2023)    Housing Stability Vital Sign     Unable to Pay for Housing in the Last Year: No     Number of Places Lived in the Last Year: 1     Unstable Housing in the Last Year: No       Review of Systems   Constitutional:  Negative for activity change, fatigue, fever and unexpected weight change.   HENT:  Negative for congestion, ear pain, hearing loss,  rhinorrhea and sore throat.    Eyes:  Negative for redness and visual disturbance.   Respiratory:  Negative for cough, shortness of breath and wheezing.    Cardiovascular:  Negative for chest pain, palpitations and leg swelling.   Gastrointestinal:  Positive for constipation. Negative for abdominal pain, diarrhea, nausea and vomiting.   Genitourinary:  Negative for dysuria, frequency and urgency.   Musculoskeletal:  Negative for back pain, joint swelling and neck pain.   Skin:  Negative for color change, rash and wound.   Neurological:  Negative for dizziness, tremors, weakness, light-headedness and headaches.         Objective:      Physical Exam  Vitals reviewed.   Constitutional:       General: She is not in acute distress.     Appearance: She is well-developed. She is obese.   HENT:      Head: Normocephalic and atraumatic.      Right Ear: External ear normal.      Left Ear: External ear normal.      Nose: Nose normal.   Eyes:      General:         Right eye: No discharge.         Left eye: No discharge.      Conjunctiva/sclera: Conjunctivae normal.   Neck:      Thyroid: No thyromegaly.   Cardiovascular:      Rate and Rhythm: Normal rate and regular rhythm.   Pulmonary:      Effort: Pulmonary effort is normal. No respiratory distress.   Skin:     General: Skin is warm and dry.   Neurological:      Mental Status: She is alert and oriented to person, place, and time.   Psychiatric:         Behavior: Behavior normal.         Thought Content: Thought content normal.         Assessment:       1. Class 2 obesity due to excess calories without serious comorbidity with body mass index (BMI) of 37.0 to 37.9 in adult    2. Chronic constipation    3. Irritable bowel syndrome with constipation    4. Major depressive disorder with single episode, in full remission        Plan:       1. Class 2 obesity due to excess calories without serious comorbidity with body mass index (BMI) of 37.0 to 37.9 in adult  Chronic  improving  Will continue vyvanse for bine-eating disorder  Tolerating well, vitals stable  Follow weight 8 weeks  Labs normal    2. Chronic constipation  Chronic stable  Some improvement  Cont linzess and PRN lactulose  GLP-1 may have been making worse--off for 3 weeks  Stay hydrated  exercise  -     linaCLOtide (LINZESS) 145 mcg Cap capsule; Take 1 capsule (145 mcg total) by mouth before breakfast. 30 minutes before food in am  Dispense: 30 capsule; Refill: 0  -     lactulose (CHRONULAC) 20 gram/30 mL Soln; Take 30 mLs (20 g total) by mouth 2 (two) times daily as needed (as needed for consipation , if no BM in 2-3 days).  Dispense: 240 mL; Refill: 0    3. Irritable bowel syndrome with constipation  Chronic stable  Some improvement  Cont linzess and PRN lactulose  GLP-1 may have been making worse--off for 3 weeks  Stay hydrated  exercise  -     linaCLOtide (LINZESS) 145 mcg Cap capsule; Take 1 capsule (145 mcg total) by mouth before breakfast. 30 minutes before food in am  Dispense: 30 capsule; Refill: 0    4. Major depressive disorder with single episode, in full remission  Chronic stable  Cont meds same dose  Cont psych follow up as planned       RTC as scheduled jan 2024 and PRN

## 2023-11-28 NOTE — PROGRESS NOTES
Subjective:    Patient ID: Kathy Beltrán is a 28 y.o. y.o. female.     Chief Complaint:   Chief Complaint   Patient presents with    Pelvic Pain    Vaginal Bleeding       History of Present Illness:  Kathy presents today complaining of 2 months of worsening pelvic pain however pain is chronic and present for the the past few years. Reports the pain is intermittent but on most days. Pain is cramping but with some episodes of sharp and stabbing. Reports since CD, she is having pains in her scar. Reports pain worse with full bladder and urination. Reports chronic constipation. She reports a strong family history of endometriosis.       ROS:   Review of Systems   Constitutional:  Positive for fatigue. Negative for activity change, appetite change, chills, diaphoresis, fever and unexpected weight change.   HENT:  Negative for mouth sores and tinnitus.    Eyes:  Negative for discharge and visual disturbance.   Respiratory:  Negative for cough, shortness of breath and wheezing.    Cardiovascular:  Negative for chest pain, palpitations and leg swelling.   Gastrointestinal:  Positive for abdominal pain and constipation. Negative for bloating, blood in stool, diarrhea, nausea and vomiting.   Endocrine: Negative for diabetes, hair loss, hot flashes, hyperthyroidism and hypothyroidism.   Genitourinary:  Positive for dysmenorrhea, dyspareunia, dysuria, pelvic pain and vaginal bleeding. Negative for flank pain, frequency, genital sores, hematuria, urgency, vaginal discharge, vaginal pain, postcoital bleeding and vaginal odor.   Musculoskeletal:  Positive for back pain and joint swelling. Negative for myalgias.   Integumentary:  Negative for rash, acne, breast mass, nipple discharge and breast skin changes.   Neurological:  Negative for seizures, syncope, numbness and headaches.   Hematological:  Negative for adenopathy. Does not bruise/bleed easily.   Psychiatric/Behavioral:  Negative for depression and sleep disturbance.  The patient is not nervous/anxious.    Breast: Negative for mass, mastodynia, nipple discharge and skin changes      Objective:    Vital Signs:  Vitals:    11/28/23 1520   BP: 116/80   Pulse: 84   Resp: 16       Physical Exam:  General:  alert, cooperative, no distress   Head Normocephalic, without obvious abnormality, atraumatic   Neck .supple, symmetrical, trachea midline   Skin:  Skin color, texture, turgor normal. No rashes or lesions   Abdomen:  soft, non-tender; bowel sounds normal   Pelvis: External genitalia: normal general appearance  Urinary system: urethral meatus normal, bladder nontender  Vaginal: normal mucosa without prolapse or lesions  Cervix: normal appearance  Uterus: normal size, shape, position  Adnexa: normal size, nontender bilaterally   Extremities extremities normal, atraumatic, no cyanosis or edema   Neurologic Grossly normal          Assessment:      1. Chronic pelvic pain in female    2. Encounter for removal and reinsertion of intrauterine contraceptive device (IUD)          Plan:      PLAN:   1. TVUS  2. Case request for dx scope  3. Patient desires replacement of IUD during surgery; Device ordered

## 2023-11-29 ENCOUNTER — TELEPHONE (OUTPATIENT)
Dept: OBSTETRICS AND GYNECOLOGY | Facility: CLINIC | Age: 28
End: 2023-11-29
Payer: COMMERCIAL

## 2023-11-29 ENCOUNTER — PATIENT MESSAGE (OUTPATIENT)
Dept: OBSTETRICS AND GYNECOLOGY | Facility: CLINIC | Age: 28
End: 2023-11-29
Payer: COMMERCIAL

## 2023-11-29 NOTE — TELEPHONE ENCOUNTER
Diagnostic laparoscope with IUD removal/ reinsertion scheduled for 12/28/2023 with pre-op, pre-admit and post op appointments scheduled and discussed with patient.  Discussed need to stop GLP-1 type drugs, ASA products, Ibuprofen medications 7 days prior to surgery and Adipex 10 days prior to surgery.  Pt verbalized understanding.  Case request number [id number].  Michelle in surgery department notified of date and time.    Please place order for Kyleena to be inserted in hospital setting, Maye Tang, PhD emailed with surgery date/time and need for Kyleena.

## 2023-11-29 NOTE — TELEPHONE ENCOUNTER
----- Message from Padmini Drummond MD sent at 11/28/2023  3:34 PM CST -----  Please schedule dx scope.     Thanks!

## 2023-11-30 ENCOUNTER — APPOINTMENT (OUTPATIENT)
Dept: RADIOLOGY | Facility: CLINIC | Age: 28
End: 2023-11-30
Attending: OBSTETRICS & GYNECOLOGY
Payer: COMMERCIAL

## 2023-11-30 DIAGNOSIS — R10.2 CHRONIC PELVIC PAIN IN FEMALE: ICD-10-CM

## 2023-11-30 DIAGNOSIS — G89.29 CHRONIC PELVIC PAIN IN FEMALE: ICD-10-CM

## 2023-11-30 DIAGNOSIS — Z30.014 ENCOUNTER FOR INITIAL PRESCRIPTION OF INTRAUTERINE CONTRACEPTIVE DEVICE (IUD): Primary | ICD-10-CM

## 2023-11-30 PROCEDURE — 76856 US EXAM PELVIC COMPLETE: CPT | Mod: 26,,, | Performed by: RADIOLOGY

## 2023-11-30 PROCEDURE — 76856 US PELVIS COMP WITH TRANSVAG NON-OB (XPD): ICD-10-PCS | Mod: 26,,, | Performed by: RADIOLOGY

## 2023-11-30 PROCEDURE — 76830 TRANSVAGINAL US NON-OB: CPT | Mod: 26,,, | Performed by: RADIOLOGY

## 2023-11-30 PROCEDURE — 76830 TRANSVAGINAL US NON-OB: CPT | Mod: TC

## 2023-11-30 PROCEDURE — 76830 US PELVIS COMP WITH TRANSVAG NON-OB (XPD): ICD-10-PCS | Mod: 26,,, | Performed by: RADIOLOGY

## 2023-12-01 LAB — BACTERIA UR CULT: NO GROWTH

## 2023-12-01 NOTE — TELEPHONE ENCOUNTER
Pt requested 12/26/23 for surgery case.  Rescheduled.  Michelle in surgery notified along with Maye Tang in pharmacy.

## 2023-12-06 ENCOUNTER — TELEPHONE (OUTPATIENT)
Dept: OBSTETRICS AND GYNECOLOGY | Facility: CLINIC | Age: 28
End: 2023-12-06
Payer: COMMERCIAL

## 2023-12-06 NOTE — TELEPHONE ENCOUNTER
----- Message from Oxana Guevara sent at 12/6/2023  2:38 PM CST -----  Kathy Beltrán  MRN: [id number]  Home Phone      Not on file.  Work Phone      Not on file.  Mobile          [phone]    Patient Care Team:  Georgina Lora MD as PCP - General (Internal Medicine)  Padmini Drummond MD as Consulting Physician (Obstetrics and Gynecology)  Raheel Beltrán MD (Obstetrics and Gynecology)  OB? No  What phone number can you be reached at? [phone]  Message: pt states her FMLA packet needs to be completed by 12/15.  She states she will be out of work 12/26/2023-1/2/2024

## 2023-12-21 ENCOUNTER — OFFICE VISIT (OUTPATIENT)
Dept: OBSTETRICS AND GYNECOLOGY | Facility: CLINIC | Age: 28
End: 2023-12-21
Payer: COMMERCIAL

## 2023-12-21 VITALS
OXYGEN SATURATION: 98 % | DIASTOLIC BLOOD PRESSURE: 78 MMHG | HEIGHT: 68 IN | RESPIRATION RATE: 18 BRPM | HEART RATE: 110 BPM | WEIGHT: 253.06 LBS | SYSTOLIC BLOOD PRESSURE: 126 MMHG | BODY MASS INDEX: 38.35 KG/M2

## 2023-12-21 DIAGNOSIS — G89.29 CHRONIC PELVIC PAIN IN FEMALE: Primary | ICD-10-CM

## 2023-12-21 DIAGNOSIS — R10.2 CHRONIC PELVIC PAIN IN FEMALE: Primary | ICD-10-CM

## 2023-12-21 PROCEDURE — 99499 NO LOS: ICD-10-PCS | Mod: S$GLB,,, | Performed by: OBSTETRICS & GYNECOLOGY

## 2023-12-21 PROCEDURE — 99499 UNLISTED E&M SERVICE: CPT | Mod: S$GLB,,, | Performed by: OBSTETRICS & GYNECOLOGY

## 2023-12-21 PROCEDURE — 99999 PR PBB SHADOW E&M-EST. PATIENT-LVL III: ICD-10-PCS | Mod: PBBFAC,,, | Performed by: OBSTETRICS & GYNECOLOGY

## 2023-12-21 PROCEDURE — 99999 PR PBB SHADOW E&M-EST. PATIENT-LVL III: CPT | Mod: PBBFAC,,, | Performed by: OBSTETRICS & GYNECOLOGY

## 2023-12-21 RX ORDER — FAMOTIDINE 20 MG/1
20 TABLET, FILM COATED ORAL
Status: CANCELLED | OUTPATIENT
Start: 2023-12-21

## 2023-12-21 RX ORDER — SODIUM CHLORIDE, SODIUM LACTATE, POTASSIUM CHLORIDE, CALCIUM CHLORIDE 600; 310; 30; 20 MG/100ML; MG/100ML; MG/100ML; MG/100ML
INJECTION, SOLUTION INTRAVENOUS CONTINUOUS
Status: CANCELLED | OUTPATIENT
Start: 2023-12-21

## 2023-12-21 NOTE — H&P (VIEW-ONLY)
"Kathy Beltrán is a 28 y.o. female  for preop examination.  She is scheduled for a diagnostic laparoscopy with IUD removal and replacement on .    Per prior note "2 months of worsening pelvic pain however pain is chronic and present for the the past few years. Reports the pain is intermittent but on most days. Pain is cramping but with some episodes of sharp and stabbing. Reports since CD, she is having pains in her scar. Reports pain worse with full bladder and urination. Reports chronic constipation. She reports a strong family history of endometriosis."    ROS:  GENERAL: Denies weight gain or weight loss. Feeling well overall.   SKIN: Denies rash or lesions.   HEAD: Denies head injury or headache.   NODES: Denies enlarged lymph nodes.   CHEST: Denies chest pain or shortness of breath.   CARDIOVASCULAR: Denies palpitations or left sided chest pain.   ABDOMEN: No abdominal pain, constipation, diarrhea, nausea, vomiting or rectal bleeding.   URINARY: No frequency, dysuria, hematuria, or burning on urination.  REPRODUCTIVE: See HPI.   BREASTS: The patient performs breast self-examination and denies pain, lumps, or nipple discharge.   HEMATOLOGIC: No easy bruisability or excessive bleeding with the exception of menstrual cycles.  MUSCULOSKELETAL: Denies joint pain or swelling.   NEUROLOGIC: Denies syncope or weakness.   PSYCHIATRIC: Denies depression, anxiety or mood swings.    Past Medical History:   Diagnosis Date    Hydradenitis      Past Surgical History:   Procedure Laterality Date    ADENOIDECTOMY      BREAST BIOPSY       SECTION          CHOLECYSTECTOMY      CHOLESTEATOMA EXCISION      TONSILLECTOMY       Family History   Problem Relation Age of Onset    Hypertension Mother     Heart disease Mother     Asthma Mother     Hypertension Father     Heart disease Father     Hypertension Maternal Grandmother     Heart disease Maternal Grandmother     Thyroid disease Maternal Grandmother     " Hypertension Maternal Grandfather     Heart disease Maternal Grandfather     Asthma Sister      Review of patient's allergies indicates:   Allergen Reactions    Pcn [penicillins]      Was told had swelling as a baby    Sulfa (sulfonamide antibiotics)      hives       Current Outpatient Medications:     buPROPion (WELLBUTRIN XL) 150 MG TB24 tablet, Take 1 tablet (150 mg total) by mouth once daily., Disp: 30 tablet, Rfl: 11    clindamycin (CLEOCIN T) 1 % external solution, Apply topically as needed (hydradenitis)., Disp: , Rfl:     doxycycline (VIBRAMYCIN) 100 MG Cap, Take 100 mg by mouth once daily., Disp: , Rfl:     lactulose (CHRONULAC) 20 gram/30 mL Soln, Take 30 mLs (20 g total) by mouth 2 (two) times daily as needed (as needed for consipation , if no BM in 2-3 days)., Disp: 240 mL, Rfl: 0    lamoTRIgine (LAMICTAL) 200 MG tablet, Take 200 mg by mouth once daily., Disp: , Rfl:     linaCLOtide (LINZESS) 145 mcg Cap capsule, Take 1 capsule (145 mcg total) by mouth before breakfast. 30 minutes before food in am, Disp: 30 capsule, Rfl: 0    lisdexamfetamine (VYVANSE) 30 MG capsule, Take 1 capsule (30 mg total) by mouth every morning., Disp: 30 capsule, Rfl: 0    ondansetron (ZOFRAN-ODT) 4 MG TbDL, Take 1 tablet (4 mg total) by mouth every 8 (eight) hours as needed., Disp: 30 tablet, Rfl: 0  Outpatient Medications Marked as Taking for the 12/21/23 encounter (Office Visit) with Padmini Drummond MD   Medication Sig Dispense Refill    buPROPion (WELLBUTRIN XL) 150 MG TB24 tablet Take 1 tablet (150 mg total) by mouth once daily. 30 tablet 11    clindamycin (CLEOCIN T) 1 % external solution Apply topically as needed (hydradenitis).      doxycycline (VIBRAMYCIN) 100 MG Cap Take 100 mg by mouth once daily.      lactulose (CHRONULAC) 20 gram/30 mL Soln Take 30 mLs (20 g total) by mouth 2 (two) times daily as needed (as needed for consipation , if no BM in 2-3 days). 240 mL 0    lamoTRIgine (LAMICTAL) 200 MG tablet Take  200 mg by mouth once daily.      linaCLOtide (LINZESS) 145 mcg Cap capsule Take 1 capsule (145 mcg total) by mouth before breakfast. 30 minutes before food in am 30 capsule 0    lisdexamfetamine (VYVANSE) 30 MG capsule Take 1 capsule (30 mg total) by mouth every morning. 30 capsule 0    ondansetron (ZOFRAN-ODT) 4 MG TbDL Take 1 tablet (4 mg total) by mouth every 8 (eight) hours as needed. 30 tablet 0     Social History     Tobacco Use    Smoking status: Never     Passive exposure: Never    Smokeless tobacco: Never   Substance Use Topics    Alcohol use: Yes     Comment: Social drinker    Drug use: Never         Last pap ENM    Last ultrasound TECHNIQUE:  Transabdominal sonography of the pelvis was performed, followed by transvaginal sonography to better evaluate the uterus and ovaries.     COMPARISON:  None.     FINDINGS:  Uterus:     Size: 8.2 x 3.5 x 5.1 cm     Masses: Nabothian cyst in the cervix.     Endometrium: Intrauterine device is in place and appears appropriately position.  Prominent vasculature within the myometrium     Right ovary:     Size: 3.5 x 2.3 x 4.0 cm     Appearance: Normal     Vascular flow: Normal.     Left ovary:     Size: 2.8 x 1.3 x 2.3 cm     Appearance: Normal     Vascular Flow: Normal.     Free Fluid:     None.     Impression:     Appropriate positioning of the patient's intrauterine device.     Prominent vascularity within the myometrium.  This could represent pelvic vascular congestion syndrome.     Nabothian cyst in the cervix.    Vitals:    12/21/23 1517   BP: 126/78   Pulse: 110   Resp: 18     General Appearance: Alert, appropriate appearance for age. No acute distress, Chest/Respiratory Exam: Normal chest wall and respirations. Clear to auscultation.   Cardiovascular Exam: regular rate and rhythm, S1, S2 normal, no murmur, click, rub or gallop   Gastrointestinal Exam: soft, non-tender; bowel sounds normal; no masses,  no organomegaly  Pelvic Exam Female: Exam deferred.    Psychiatric Exam: Alert and oriented, appropriate affect.    Assessment:   CPP  IUD removal  IUD insertion    Plan:   Diagnostic scope  IUD removal and replacement   Pre-op appt  Pre-op labs  Consents     I have discussed the risks, benefits, indications, and alternatives of the procedure in detail.  The patient verbalizes her understanding.  All questions answered.  Consents signed.  The patient agrees to proceed to proceed as planned.

## 2023-12-22 ENCOUNTER — PATIENT MESSAGE (OUTPATIENT)
Dept: INTERNAL MEDICINE | Facility: CLINIC | Age: 28
End: 2023-12-22
Payer: COMMERCIAL

## 2023-12-22 DIAGNOSIS — K59.09 CHRONIC CONSTIPATION: ICD-10-CM

## 2023-12-22 DIAGNOSIS — K58.1 IRRITABLE BOWEL SYNDROME WITH CONSTIPATION: ICD-10-CM

## 2023-12-22 DIAGNOSIS — F50.81 BINGE EATING DISORDER: ICD-10-CM

## 2023-12-22 RX ORDER — LACTULOSE 10 G/15ML
20 SOLUTION ORAL; RECTAL 2 TIMES DAILY PRN
Qty: 240 ML | Refills: 0 | Status: SHIPPED | OUTPATIENT
Start: 2023-12-22

## 2023-12-22 RX ORDER — LISDEXAMFETAMINE DIMESYLATE 30 MG/1
30 CAPSULE ORAL EVERY MORNING
Qty: 30 CAPSULE | Refills: 0 | Status: SHIPPED | OUTPATIENT
Start: 2023-12-22 | End: 2024-01-20 | Stop reason: SDUPTHER

## 2023-12-22 NOTE — TELEPHONE ENCOUNTER
No care due was identified.  Health [facility] Embedded Care Due Messages. Reference number: [id number].   12/22/2023 10:39:28 AM CST

## 2023-12-22 NOTE — TELEPHONE ENCOUNTER
No care due was identified.  Health [facility] Embedded Care Due Messages. Reference number: [id number].   12/22/2023 10:19:00 AM CST

## 2023-12-22 NOTE — TELEPHONE ENCOUNTER
No care due was identified.  [facility] Embedded Care Due Messages. Reference number: [id number].   12/22/2023 10:18:27 AM CST

## 2023-12-26 ENCOUNTER — ANESTHESIA EVENT (OUTPATIENT)
Dept: SURGERY | Facility: HOSPITAL | Age: 28
End: 2023-12-26
Payer: COMMERCIAL

## 2023-12-26 ENCOUNTER — PATIENT MESSAGE (OUTPATIENT)
Dept: SURGERY | Facility: HOSPITAL | Age: 28
End: 2023-12-26
Payer: COMMERCIAL

## 2023-12-26 ENCOUNTER — HOSPITAL ENCOUNTER (OUTPATIENT)
Facility: HOSPITAL | Age: 28
Discharge: HOME | End: 2023-12-26
Attending: OBSTETRICS & GYNECOLOGY | Admitting: OBSTETRICS & GYNECOLOGY
Payer: COMMERCIAL

## 2023-12-26 ENCOUNTER — ANESTHESIA (OUTPATIENT)
Dept: SURGERY | Facility: HOSPITAL | Age: 28
End: 2023-12-26
Payer: COMMERCIAL

## 2023-12-26 VITALS
HEART RATE: 64 BPM | OXYGEN SATURATION: 100 % | DIASTOLIC BLOOD PRESSURE: 68 MMHG | SYSTOLIC BLOOD PRESSURE: 118 MMHG | RESPIRATION RATE: 16 BRPM | TEMPERATURE: 97 F

## 2023-12-26 DIAGNOSIS — R10.2 CHRONIC PELVIC PAIN IN FEMALE: Primary | ICD-10-CM

## 2023-12-26 DIAGNOSIS — G89.29 CHRONIC PELVIC PAIN IN FEMALE: Primary | ICD-10-CM

## 2023-12-26 LAB — B-HCG UR QL: NEGATIVE

## 2023-12-26 PROCEDURE — 81025 URINE PREGNANCY TEST: CPT | Performed by: OBSTETRICS & GYNECOLOGY

## 2023-12-26 PROCEDURE — 63600175 PHARM REV CODE 636 W HCPCS: Performed by: NURSE ANESTHETIST, CERTIFIED REGISTERED

## 2023-12-26 PROCEDURE — 71000033 HC RECOVERY, INTIAL HOUR: Performed by: OBSTETRICS & GYNECOLOGY

## 2023-12-26 PROCEDURE — 71000039 HC RECOVERY, EACH ADD'L HOUR: Performed by: OBSTETRICS & GYNECOLOGY

## 2023-12-26 PROCEDURE — 36000709 HC OR TIME LEV III EA ADD 15 MIN: Performed by: OBSTETRICS & GYNECOLOGY

## 2023-12-26 PROCEDURE — 63600175 PHARM REV CODE 636 W HCPCS

## 2023-12-26 PROCEDURE — 37000008 HC ANESTHESIA 1ST 15 MINUTES: Performed by: OBSTETRICS & GYNECOLOGY

## 2023-12-26 PROCEDURE — D9220AH HC ANESTHESIA PROFESSIONAL FEE: Mod: QZ,P2 | Performed by: NURSE ANESTHETIST, CERTIFIED REGISTERED

## 2023-12-26 PROCEDURE — 00840 ANES IPER PX LOWER ABD NOS: CPT | Mod: QZ,P2 | Performed by: NURSE ANESTHETIST, CERTIFIED REGISTERED

## 2023-12-26 PROCEDURE — 36000708 HC OR TIME LEV III 1ST 15 MIN: Performed by: OBSTETRICS & GYNECOLOGY

## 2023-12-26 PROCEDURE — 25000003 PHARM REV CODE 250: Performed by: NURSE ANESTHETIST, CERTIFIED REGISTERED

## 2023-12-26 PROCEDURE — 37000009 HC ANESTHESIA EA ADD 15 MINS: Performed by: OBSTETRICS & GYNECOLOGY

## 2023-12-26 RX ORDER — ROCURONIUM BROMIDE 10 MG/ML
INJECTION, SOLUTION INTRAVENOUS
Status: DISCONTINUED | OUTPATIENT
Start: 2023-12-26 | End: 2023-12-26

## 2023-12-26 RX ORDER — DEXAMETHASONE SODIUM PHOSPHATE 4 MG/ML
INJECTION, SOLUTION INTRA-ARTICULAR; INTRALESIONAL; INTRAMUSCULAR; INTRAVENOUS; SOFT TISSUE
Status: DISCONTINUED | OUTPATIENT
Start: 2023-12-26 | End: 2023-12-26

## 2023-12-26 RX ORDER — SODIUM CHLORIDE, SODIUM LACTATE, POTASSIUM CHLORIDE, CALCIUM CHLORIDE 600; 310; 30; 20 MG/100ML; MG/100ML; MG/100ML; MG/100ML
INJECTION, SOLUTION INTRAVENOUS CONTINUOUS
Status: DISCONTINUED | OUTPATIENT
Start: 2023-12-26 | End: 2023-12-26 | Stop reason: HOSPADM

## 2023-12-26 RX ORDER — ONDANSETRON HYDROCHLORIDE 2 MG/ML
INJECTION, SOLUTION INTRAMUSCULAR; INTRAVENOUS
Status: DISCONTINUED | OUTPATIENT
Start: 2023-12-26 | End: 2023-12-26

## 2023-12-26 RX ORDER — LIDOCAINE HYDROCHLORIDE 20 MG/ML
INJECTION, SOLUTION EPIDURAL; INFILTRATION; INTRACAUDAL; PERINEURAL
Status: DISCONTINUED | OUTPATIENT
Start: 2023-12-26 | End: 2023-12-26

## 2023-12-26 RX ORDER — IBUPROFEN 800 MG/1
800 TABLET ORAL EVERY 8 HOURS PRN
Qty: 20 TABLET | Refills: 0 | Status: SHIPPED | OUTPATIENT
Start: 2023-12-26 | End: 2024-01-11

## 2023-12-26 RX ORDER — IBUPROFEN 600 MG/1
600 TABLET ORAL EVERY 6 HOURS PRN
Status: DISCONTINUED | OUTPATIENT
Start: 2023-12-26 | End: 2023-12-26 | Stop reason: HOSPADM

## 2023-12-26 RX ORDER — DIPHENHYDRAMINE HYDROCHLORIDE 50 MG/ML
25 INJECTION, SOLUTION INTRAMUSCULAR; INTRAVENOUS EVERY 4 HOURS PRN
Status: DISCONTINUED | OUTPATIENT
Start: 2023-12-26 | End: 2023-12-26 | Stop reason: HOSPADM

## 2023-12-26 RX ORDER — PROCHLORPERAZINE EDISYLATE 5 MG/ML
5 INJECTION INTRAMUSCULAR; INTRAVENOUS EVERY 6 HOURS PRN
Status: DISCONTINUED | OUTPATIENT
Start: 2023-12-26 | End: 2023-12-26 | Stop reason: HOSPADM

## 2023-12-26 RX ORDER — HYDROCODONE BITARTRATE AND ACETAMINOPHEN 5; 325 MG/1; MG/1
1 TABLET ORAL EVERY 4 HOURS PRN
Status: DISCONTINUED | OUTPATIENT
Start: 2023-12-26 | End: 2023-12-26 | Stop reason: HOSPADM

## 2023-12-26 RX ORDER — HYDROCODONE BITARTRATE AND ACETAMINOPHEN 5; 325 MG/1; MG/1
1 TABLET ORAL EVERY 6 HOURS PRN
Qty: 10 TABLET | Refills: 0 | Status: SHIPPED | OUTPATIENT
Start: 2023-12-26 | End: 2024-01-11

## 2023-12-26 RX ORDER — PROPOFOL 10 MG/ML
VIAL (ML) INTRAVENOUS
Status: DISCONTINUED | OUTPATIENT
Start: 2023-12-26 | End: 2023-12-26

## 2023-12-26 RX ORDER — MIDAZOLAM HYDROCHLORIDE 1 MG/ML
INJECTION INTRAMUSCULAR; INTRAVENOUS
Status: DISCONTINUED | OUTPATIENT
Start: 2023-12-26 | End: 2023-12-26

## 2023-12-26 RX ORDER — ONDANSETRON 8 MG/1
8 TABLET, ORALLY DISINTEGRATING ORAL EVERY 8 HOURS PRN
Status: DISCONTINUED | OUTPATIENT
Start: 2023-12-26 | End: 2023-12-26 | Stop reason: HOSPADM

## 2023-12-26 RX ORDER — DIPHENHYDRAMINE HCL 25 MG
25 CAPSULE ORAL EVERY 4 HOURS PRN
Status: DISCONTINUED | OUTPATIENT
Start: 2023-12-26 | End: 2023-12-26 | Stop reason: HOSPADM

## 2023-12-26 RX ORDER — FAMOTIDINE 20 MG/1
20 TABLET, FILM COATED ORAL
Status: DISCONTINUED | OUTPATIENT
Start: 2023-12-26 | End: 2023-12-26 | Stop reason: HOSPADM

## 2023-12-26 RX ORDER — FENTANYL CITRATE 50 UG/ML
INJECTION, SOLUTION INTRAMUSCULAR; INTRAVENOUS
Status: DISCONTINUED | OUTPATIENT
Start: 2023-12-26 | End: 2023-12-26

## 2023-12-26 RX ORDER — HYDROCODONE BITARTRATE AND ACETAMINOPHEN 10; 325 MG/1; MG/1
1 TABLET ORAL EVERY 4 HOURS PRN
Status: DISCONTINUED | OUTPATIENT
Start: 2023-12-26 | End: 2023-12-26 | Stop reason: HOSPADM

## 2023-12-26 RX ORDER — SODIUM CHLORIDE, SODIUM LACTATE, POTASSIUM CHLORIDE, CALCIUM CHLORIDE 600; 310; 30; 20 MG/100ML; MG/100ML; MG/100ML; MG/100ML
INJECTION, SOLUTION INTRAVENOUS CONTINUOUS PRN
Status: DISCONTINUED | OUTPATIENT
Start: 2023-12-26 | End: 2023-12-26

## 2023-12-26 RX ADMIN — FENTANYL CITRATE 150 MCG: 0.05 INJECTION, SOLUTION INTRAMUSCULAR; INTRAVENOUS at 07:12

## 2023-12-26 RX ADMIN — MIDAZOLAM HYDROCHLORIDE 2 MG: 1 INJECTION, SOLUTION INTRAMUSCULAR; INTRAVENOUS at 07:12

## 2023-12-26 RX ADMIN — SUGAMMADEX 200 MG: 100 INJECTION, SOLUTION INTRAVENOUS at 07:12

## 2023-12-26 RX ADMIN — LIDOCAINE HYDROCHLORIDE 100 MG: 20 INJECTION, SOLUTION EPIDURAL; INFILTRATION; INTRACAUDAL; PERINEURAL at 07:12

## 2023-12-26 RX ADMIN — FENTANYL CITRATE 100 MCG: 0.05 INJECTION, SOLUTION INTRAMUSCULAR; INTRAVENOUS at 07:12

## 2023-12-26 RX ADMIN — PROPOFOL 200 MG: 10 INJECTION, EMULSION INTRAVENOUS at 07:12

## 2023-12-26 RX ADMIN — ONDANSETRON 4 MG: 2 INJECTION INTRAMUSCULAR; INTRAVENOUS at 07:12

## 2023-12-26 RX ADMIN — SODIUM CHLORIDE, SODIUM LACTATE, POTASSIUM CHLORIDE, AND CALCIUM CHLORIDE: .6; .31; .03; .02 INJECTION, SOLUTION INTRAVENOUS at 07:12

## 2023-12-26 RX ADMIN — ROCURONIUM BROMIDE 35 MG: 10 INJECTION, SOLUTION INTRAVENOUS at 07:12

## 2023-12-26 RX ADMIN — DEXAMETHASONE SODIUM PHOSPHATE 4 MG: 4 INJECTION, SOLUTION INTRAMUSCULAR; INTRAVENOUS at 07:12

## 2023-12-26 NOTE — TRANSFER OF CARE
Anesthesia Transfer of Care Note    Patient: Kathy Beltrán    Procedure(s) Performed: Procedure(s) (LRB):  LAPAROSCOPY, DIAGNOSTIC (N/A)  REMOVAL, INTRAUTERINE DEVICE (N/A)  INSERTION, INTRAUTERINE DEVICE (KYLENA) (N/A)    Patient location: PACU    Anesthesia Type: general    Transport from OR: Transported from OR on 6-10 L/min O2 by face mask with adequate spontaneous ventilation    Post pain: adequate analgesia    Post assessment: no apparent anesthetic complications and tolerated procedure well    Post vital signs: stable    Level of consciousness: sedated    Nausea/Vomiting: no nausea/vomiting    Complications: none    Transfer of care protocol was followed      Last vitals: Visit Vitals  /83 (BP Location: Left arm, Patient Position: Lying)   Pulse 95   Temp 36.2 °C (97.1 °F)   Resp 16   LMP  (LMP Unknown)   SpO2 96%   Breastfeeding No

## 2023-12-26 NOTE — ANESTHESIA PREPROCEDURE EVALUATION
12/26/2023  Kathy Beltrán is a 28 y.o., female.      Pre-op Assessment    I have reviewed the Patient Summary Reports.     I have reviewed the Nursing Notes.    I have reviewed the Medications.     Review of Systems  Anesthesia Hx:  No problems with previous Anesthesia                Social:  Non-Smoker, No Alcohol Use       Hematology/Oncology:  Hematology Normal   Oncology Normal                                   EENT/Dental:  EENT/Dental Normal           Cardiovascular:  Cardiovascular Normal Exercise tolerance: good                                           Pulmonary:  Pulmonary Normal                       Renal/:  Renal/ Normal                 Hepatic/GI:  Hepatic/GI Normal                 Musculoskeletal:  Musculoskeletal Normal                Neurological:  Neurology Normal                                      Endocrine:  Endocrine Normal          Morbid Obesity / BMI > 40  Dermatological:  Skin Normal    Psych:  Psychiatric Normal                    Physical Exam  General: Well nourished    Airway:  Mallampati: II / II  Mouth Opening: Normal        Anesthesia Plan  Type of Anesthesia, risks & benefits discussed:    Anesthesia Type: Gen ETT  Intra-op Monitoring Plan: Standard ASA Monitors  Post Op Pain Control Plan: multimodal analgesia  Induction:  IV  Airway Plan: Direct, Awake  Informed Consent: Informed consent signed with the Patient and all parties understand the risks and agree with anesthesia plan.  All questions answered. Patient consented to blood products? Yes  ASA Score: 2  Day of Surgery Review of History & Physical: H&P Update referred to the surgeon/provider.I have interviewed and examined the patient. I have reviewed the patient's H&P dated: 12/26/23. There are no significant changes.     Ready For Surgery From Anesthesia Perspective.     .

## 2023-12-26 NOTE — OP NOTE
Date: 12/26/23    Procedure:   Diagnostic laparoscopy  IUD removal  IUD insertion      Surgeon: Padmini Drummond MD    Pre-op Dx:   CPP  Desires IUD removal and reinsertion    Post-op Dx: same    Anesthesia: GETA    EBL: 0 cc    DVT prophylaxis: Bilateral sequential compression devices    Perioperative antibiotics: n/a    Specimen: none    Operative Findings: Normal appearing uterus, tubes and ovaries. No evidence of endometriosis. Appendix normal. Liver edge visualized normal. Uterus sound to 9 cm.     Complications: none    PROCEDURE IN DETAIL:     The patient was  transferred to the Operating Room, where general endotracheal anesthesia was administered without complication. She was placed in the dorsal lithotomy position in [facility]. The patient's abdomen and perineum were prepped in the normal sterile fashion. The patient was draped.  A 5 mm skin incision was made in the patient's infraumbilical region. Veress needle was inserted into this location with proper intraperitoneal placement, confirmed with the saline drop test and low intraabdominal pressure.  The abdomen was insufflated. The 5 mm infraumbilical trocar was inserted under direct laparoscopic visualization. Next, a LLQ 5 mm skin incision was made in the patient's lower left lateral abdomen. 5 mm trocar was placed under direct laparoscopic visualization. The patient was placed in steep Trendelenburg position. The abdomen and pelvis were surveyed with the above noted findings. Next, the ports were removed under direct laparoscopic visualization with excellent hemostasis noted. Skin incisions were closed with dermoplast. A speculum was inserted into the vagina and the cervix was grasped with an Allis clamp. IUD was removed and uterus sound to 9 cm. New IUD was placed without difficulty. All instruments were removed. Sponge and instrument count was correct x2. The patient tolerated the procedure well and was transferred to recovery in stable  condition.

## 2023-12-26 NOTE — ANESTHESIA POSTPROCEDURE EVALUATION
Anesthesia Post Evaluation    Patient: Kathy Beltrán    Procedure(s) Performed: Procedure(s) (LRB):  LAPAROSCOPY, DIAGNOSTIC (N/A)  REMOVAL, INTRAUTERINE DEVICE (N/A)  INSERTION, INTRAUTERINE DEVICE (KYLENA) (N/A)    Final Anesthesia Type: general      Patient location during evaluation: PACU  Patient participation: Yes- Able to Participate  Level of consciousness: awake and alert, oriented and awake  Post-procedure vital signs: reviewed and stable  Pain management: adequate  Airway patency: patent    PONV status at discharge: No PONV  Anesthetic complications: no      Cardiovascular status: blood pressure returned to baseline  Respiratory status: unassisted, spontaneous ventilation and room air  Hydration status: euvolemic  Follow-up not needed.  Comments: [facility]              Vitals Value Taken Time   /83 12/26/23 0812   Temp 36.2 °C (97.1 °F) 12/26/23 0803   Pulse 95 12/26/23 0812   Resp 16 12/26/23 0812   SpO2 96 % 12/26/23 0812         No case tracking events are documented in the log.      Pain/Justo Score: Justo Score: 10 (12/26/2023  8:15 AM)

## 2023-12-26 NOTE — ANESTHESIA PROCEDURE NOTES
Intubation    Date/Time: 12/26/2023 7:26 AM    Performed by: Bertram Sullivan CRNA  Authorized by: Bertram Sullivan CRNA    Intubation:     Induction:  Intravenous    Intubated:  Postinduction    Mask Ventilation:  Easy mask    Attempts:  1    Attempted By:  CRNA    Method of Intubation:  Direct    Blade:  Garcia 4    Laryngeal View Grade: Grade I - full view of cords      Difficult Airway Encountered?: No      Complications:  None    Airway Device:  Direct and oral endotracheal tube    Airway Device Size:  7.5    Style/Cuff Inflation:  Cuffed (inflated to minimal occlusive pressure)    Tube secured:  20    Secured at:  The lips    Placement Verified By:  Auscultation    Complicating Factors:  None    Findings Post-Intubation:  Bilateral breath sounds, positive ETCO2 and atraumatic / condition of teeth unchanged

## 2023-12-26 NOTE — TELEPHONE ENCOUNTER
Kathy desire Rx of Zofran d/t Nausea.  Please advise.  Patient Active Problem List   Diagnosis    Class 2 obesity due to excess calories without serious comorbidity with body mass index (BMI) of 38.0 to 38.9 in adult    Major depressive disorder with single episode, in full remission    Gastritis    Chronic pelvic pain in female     Prior to Admission medications    Medication Sig Start Date End Date Taking? Authorizing Provider   buPROPion (WELLBUTRIN XL) 150 MG TB24 tablet Take 1 tablet (150 mg total) by mouth once daily. 7/7/23 7/6/24  Georgina Lora MD   clindamycin (CLEOCIN T) 1 % external solution Apply topically as needed (hydradenitis). 11/26/23   Provider, Historical   doxycycline (VIBRAMYCIN) 100 MG Cap Take 100 mg by mouth once daily. 11/26/23   Provider, Historical   HYDROcodone-acetaminophen (NORCO) 5-325 mg per tablet Take 1 tablet by mouth every 6 (six) hours as needed for Pain. 12/26/23   Padmini Drummond MD   ibuprofen (ADVIL,MOTRIN) 800 MG tablet Take 1 tablet (800 mg total) by mouth every 8 (eight) hours as needed for Pain. 12/26/23   Padmini Drummond MD   lactulose (CHRONULAC) 10 gram/15 mL solution Take 30 mLs (20 g total) by mouth 2 (two) times daily as needed (as needed for consipation , if no BM in 2-3 days). 12/22/23   Georgina Lora MD   lamoTRIgine (LAMICTAL) 200 MG tablet Take 200 mg by mouth once daily. 10/3/23   Provider, Historical   linaCLOtide (LINZESS) 145 mcg Cap capsule Take 1 capsule (145 mcg total) by mouth before breakfast. 30 minutes before food in am 12/22/23   Georgina Lora MD   lisdexamfetamine (VYVANSE) 30 MG capsule Take 1 capsule (30 mg total) by mouth every morning. 12/22/23   Georgina Lora MD   ondansetron (ZOFRAN-ODT) 4 MG TbDL Take 1 tablet (4 mg total) by mouth every 8 (eight) hours as needed. 10/23/23   Vivi Su,    vilazodone (VIIBRYD) 20 mg Tab Take 1 tablet (20 mg total) by mouth once daily with food. 5/8/23 6/29/23

## 2023-12-26 NOTE — DISCHARGE SUMMARY
"[facility]  Obstetrics & Gynecology  Discharge Summary    Patient Name: Kathy Beltrán  MRN: [id number]  Admission Date: 12/26/2023  Hospital Length of Stay: 0 days  Discharge Date and Time:  12/26/2023 8:24 AM  Attending Physician: Padmini Drummond, *   Discharging Provider: Padmini Drummond MD  Primary Care Provider: Georgina Lora MD    HPI:  No notes on file    Hospital Course:  No notes on file    Goals of Care Treatment Preferences:         Procedure(s) (LRB):  LAPAROSCOPY, DIAGNOSTIC (N/A)  REMOVAL, INTRAUTERINE DEVICE (N/A)  INSERTION, INTRAUTERINE DEVICE (KYLENA) (N/A)         Significant Diagnostic Studies: Labs: CBC No results for input(s): "WBC", "HGB", "HCT", "PLT" in the last 48 hours.      Pending Diagnostic Studies:       None          Final Active Diagnoses:    Diagnosis Date Noted POA    PRINCIPAL PROBLEM:  Chronic pelvic pain in female [R10.2, G89.29] 12/26/2023 Yes      Problems Resolved During this Admission:        Discharged Condition: good    Disposition: Home    Follow Up:   Follow-up Information       Padmini Drummond MD Follow up in 2 week(s).    Specialty: Obstetrics and Gynecology  Contact information:  [address] [id number] [phone]                           Patient Instructions:      Diet general     Call MD for:  hives     Call MD for:  redness, tenderness, or signs of infection (pain, swelling, redness, odor or green/yellow discharge around incision site)     Call MD for:  difficulty breathing, headache or visual disturbances     Call MD for:  severe uncontrolled pain     Call MD for:  persistent nausea and vomiting     Call MD for:  temperature >100.4     No dressing needed     Medications:  Reconciled Home Medications:      Medication List        START taking these medications      HYDROcodone-acetaminophen 5-325 mg per tablet  Commonly known as: NORCO  Take 1 tablet by mouth every 6 (six) hours as needed for Pain.     ibuprofen 800 " MG tablet  Commonly known as: ADVIL,MOTRIN  Take 1 tablet (800 mg total) by mouth every 8 (eight) hours as needed for Pain.            CONTINUE taking these medications      buPROPion 150 MG TB24 tablet  Commonly known as: WELLBUTRIN XL  Take 1 tablet (150 mg total) by mouth once daily.     clindamycin 1 % external solution  Commonly known as: CLEOCIN T  Apply topically as needed (hydradenitis).     doxycycline 100 MG Cap  Commonly known as: VIBRAMYCIN  Take 100 mg by mouth once daily.     lactulose 20 gram/30 mL Soln  Commonly known as: CHRONULAC  Take 30 mLs (20 g total) by mouth 2 (two) times daily as needed (as needed for consipation , if no BM in 2-3 days).     lamoTRIgine 200 MG tablet  Commonly known as: LAMICTAL  Take 200 mg by mouth once daily.     linaCLOtide 145 mcg Cap capsule  Commonly known as: LINZESS  Take 1 capsule (145 mcg total) by mouth before breakfast. 30 minutes before food in am     lisdexamfetamine 30 MG capsule  Commonly known as: VYVANSE  Take 1 capsule (30 mg total) by mouth every morning.     ondansetron 4 MG Tbdl  Commonly known as: ZOFRAN-ODT  Take 1 tablet (4 mg total) by mouth every 8 (eight) hours as needed.              Padmini Drummond MD  Obstetrics & Gynecology  [place] [facility]

## 2023-12-28 RX ORDER — ONDANSETRON 4 MG/1
4 TABLET, ORALLY DISINTEGRATING ORAL 2 TIMES DAILY
Qty: 20 TABLET | Refills: 0 | Status: SHIPPED | OUTPATIENT
Start: 2023-12-28 | End: 2024-01-11

## 2024-01-01 ENCOUNTER — PATIENT MESSAGE (OUTPATIENT)
Dept: OBSTETRICS AND GYNECOLOGY | Facility: CLINIC | Age: 29
End: 2024-01-01
Payer: COMMERCIAL

## 2024-01-11 ENCOUNTER — OFFICE VISIT (OUTPATIENT)
Dept: OBSTETRICS AND GYNECOLOGY | Facility: CLINIC | Age: 29
End: 2024-01-11
Payer: COMMERCIAL

## 2024-01-11 VITALS
DIASTOLIC BLOOD PRESSURE: 78 MMHG | HEART RATE: 78 BPM | BODY MASS INDEX: 37.21 KG/M2 | WEIGHT: 245.56 LBS | HEIGHT: 68 IN | SYSTOLIC BLOOD PRESSURE: 108 MMHG

## 2024-01-11 DIAGNOSIS — G89.29 CHRONIC PELVIC PAIN IN FEMALE: ICD-10-CM

## 2024-01-11 DIAGNOSIS — Z98.890 S/P LAPAROSCOPY: Primary | ICD-10-CM

## 2024-01-11 DIAGNOSIS — R10.2 CHRONIC PELVIC PAIN IN FEMALE: ICD-10-CM

## 2024-01-11 PROCEDURE — 3078F DIAST BP <80 MM HG: CPT | Mod: CPTII,S$GLB,, | Performed by: OBSTETRICS & GYNECOLOGY

## 2024-01-11 PROCEDURE — 1159F MED LIST DOCD IN RCRD: CPT | Mod: CPTII,S$GLB,, | Performed by: OBSTETRICS & GYNECOLOGY

## 2024-01-11 PROCEDURE — 1160F RVW MEDS BY RX/DR IN RCRD: CPT | Mod: CPTII,S$GLB,, | Performed by: OBSTETRICS & GYNECOLOGY

## 2024-01-11 PROCEDURE — 99999 PR PBB SHADOW E&M-EST. PATIENT-LVL III: CPT | Mod: PBBFAC,,, | Performed by: OBSTETRICS & GYNECOLOGY

## 2024-01-11 PROCEDURE — 3074F SYST BP LT 130 MM HG: CPT | Mod: CPTII,S$GLB,, | Performed by: OBSTETRICS & GYNECOLOGY

## 2024-01-11 PROCEDURE — 99024 POSTOP FOLLOW-UP VISIT: CPT | Mod: S$GLB,,, | Performed by: OBSTETRICS & GYNECOLOGY

## 2024-01-11 NOTE — PROGRESS NOTES
Chief Complaint   Patient presents with    Post-op Evaluation       Kathy Beltrán is a 28 y.o. female  post-op from a dx scope with IUD removal and reinsertion on 23.  Patient is Doing well postoperatively..      Patient reports pain well controlled. Bowel and bladder function normal. Good appetite. No fever or chills. No abnormal vaginal bleeding or discharge.     The pathology revealed:  none    Past Medical History:   Diagnosis Date    Hydradenitis      Past Surgical History:   Procedure Laterality Date    ADENOIDECTOMY      BREAST BIOPSY       SECTION          CHOLECYSTECTOMY      CHOLESTEATOMA EXCISION      DIAGNOSTIC LAPAROSCOPY N/A 2023    Procedure: LAPAROSCOPY, DIAGNOSTIC;  Surgeon: Padmini Drummond MD;  Location: [facility] OR;  Service: OB/GYN;  Laterality: N/A;    INTRAUTERINE DEVICE INSERTION N/A 2023    Procedure: INSERTION, INTRAUTERINE DEVICE (KYLEENA);  Surgeon: Padmini Drummond MD;  Location: [facility] OR;  Service: OB/GYN;  Laterality: N/A;    REMOVAL OF INTRAUTERINE DEVICE (IUD) N/A 2023    Procedure: REMOVAL, INTRAUTERINE DEVICE;  Surgeon: Padmini Drummond MD;  Location: [facility] OR;  Service: OB/GYN;  Laterality: N/A;    TONSILLECTOMY       Family History   Problem Relation Age of Onset    Hypertension Mother     Heart disease Mother     Asthma Mother     Hypertension Father     Heart disease Father     Hypertension Maternal Grandmother     Heart disease Maternal Grandmother     Thyroid disease Maternal Grandmother     Hypertension Maternal Grandfather     Heart disease Maternal Grandfather     Asthma Sister      Social History     Tobacco Use    Smoking status: Never     Passive exposure: Never    Smokeless tobacco: Never   Substance Use Topics    Alcohol use: Yes     Comment: Social drinker    Drug use: Never     OB History    Para Term  AB Living   1 1 1     1   SAB IAB Ectopic Multiple Live Births           1      # Outcome  "Date GA Lbr [place]/2nd Weight Sex Delivery Anes PTL Lv   1 Term     M CS-Unspec  N JOSE DE JESUS       /78   Pulse 78   Ht 5' 8" (1.727 m)   Wt 111.4 kg (245 lb 9.5 oz)   LMP  (LMP Unknown) Comment: IUD  BMI 37.34 kg/m²     ROS:  GENERAL: No fever, chills, fatigability or weight loss.  VULVAR: No pain, no lesions and no itching.  VAGINAL: No relaxation, no itching, no discharge, no abnormal bleeding and no lesions.  ABDOMEN: No abdominal pain. Denies nausea. Denies vomiting. No diarrhea. No constipation  BREAST: Denies pain. No lumps. No discharge.  URINARY: No incontinence, no nocturia, no frequency and no dysuria.  CARDIOVASCULAR: No chest pain. No shortness of breath. No leg cramps.  NEUROLOGICAL: No headaches. No vision changes.    PE:   Abdomen: soft, non-tender; bowel sounds normal; no masses,  no organomegaly  Incisions: c/d/i; well healed      ASSESSMENT:    1. S/P laparoscopy        2. Chronic pelvic pain in female  Ambulatory referral/consult to Physical/Occupational Therapy           PLAN:  1. Release  2. Referral to pelvic floor PT    "

## 2024-01-19 ENCOUNTER — CLINICAL SUPPORT (OUTPATIENT)
Dept: REHABILITATION | Facility: HOSPITAL | Age: 29
End: 2024-01-19
Payer: COMMERCIAL

## 2024-01-19 DIAGNOSIS — M62.838 MUSCLE SPASM: ICD-10-CM

## 2024-01-19 DIAGNOSIS — R10.2 CHRONIC PELVIC PAIN IN FEMALE: ICD-10-CM

## 2024-01-19 DIAGNOSIS — G89.29 CHRONIC PELVIC PAIN IN FEMALE: ICD-10-CM

## 2024-01-19 DIAGNOSIS — R27.8 COORDINATION ABNORMAL: Primary | ICD-10-CM

## 2024-01-19 PROCEDURE — 97161 PT EVAL LOW COMPLEX 20 MIN: CPT | Mod: PN | Performed by: PHYSICAL THERAPIST
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
## 2024-01-20 DIAGNOSIS — F50.81 BINGE EATING DISORDER: ICD-10-CM

## 2024-01-20 NOTE — TELEPHONE ENCOUNTER
No care due was identified.  [facility] Embedded Care Due Messages. Reference number: [id number].   1/20/2024 11:13:59 AM CST

## 2024-01-24 RX ORDER — LISDEXAMFETAMINE DIMESYLATE 30 MG/1
30 CAPSULE ORAL EVERY MORNING
Qty: 30 CAPSULE | Refills: 0 | Status: SHIPPED | OUTPATIENT
Start: 2024-01-24 | End: 2024-02-19 | Stop reason: SDUPTHER

## 2024-01-25 ENCOUNTER — CLINICAL SUPPORT (OUTPATIENT)
Dept: REHABILITATION | Facility: HOSPITAL | Age: 29
End: 2024-01-25
Attending: OBSTETRICS & GYNECOLOGY
Payer: COMMERCIAL

## 2024-01-25 DIAGNOSIS — R27.8 COORDINATION ABNORMAL: ICD-10-CM

## 2024-01-25 DIAGNOSIS — R10.2 CHRONIC PELVIC PAIN IN FEMALE: Primary | ICD-10-CM

## 2024-01-25 DIAGNOSIS — G89.29 CHRONIC PELVIC PAIN IN FEMALE: Primary | ICD-10-CM

## 2024-01-25 DIAGNOSIS — M62.838 MUSCLE SPASM: ICD-10-CM

## 2024-01-25 PROCEDURE — 97112 NEUROMUSCULAR REEDUCATION: CPT | Mod: PN | Performed by: PHYSICAL THERAPIST

## 2024-01-25 PROCEDURE — 97530 THERAPEUTIC ACTIVITIES: CPT | Mod: PN | Performed by: PHYSICAL THERAPIST

## 2024-01-25 NOTE — PATIENT INSTRUCTIONS
DIAPHRAGMATIC BREATHING     The diaphragm is a dome shaped muscle that forms the floor of the rib cage. It is the most efficient muscle for breathing and relaxation, although most people are not used to using the diaphragm. Diaphragmatic or belly breathing is an important technique to learn because it helps settle down or relax the autonomic nervous system. The correct use of diaphragmatic breathing can help to quiet brain activity resulting in the relaxation of all the muscles and organs of the body. This is accomplished by slow rhythmic breathing concentrated in the diaphragm muscle rather than the chest.    How to do proper relaxation breathing:    Start by lying on your back or reclining in a chair in a relaxed position. Place one hand on your chest and the other on your abdomen.  Relax your jaw by placing your tongue on the floor of your mouth and keeping your teeth slightly apart.   Take a deep breath in, letting the abdomen expand and rise while you keep your upper chest, neck and shoulders relaxed.   As you breathe out, allow your abdomen and chest to fall. Exhale completely.  It doesn't matter if you breathe in/out through your nose and/or mouth. Do whichever feels comfortable.  Remember to breathe slowly.  Do not force your breathing. Do not hold your breath.  Repeat for 5 minutes every day.           Urinate every 2 hours during the day -   Double void when you needed it or before bed.

## 2024-01-25 NOTE — PROGRESS NOTES
Pelvic Health Physical Therapy   Treatment Note     [facility]  Cl[facility] Number: [id number]    Therapy Diagnosis:   Encounter Diagnoses   Name Primary?    Chronic pelvic pain in female Yes    Coordination abnormal     Muscle spasm      Physician: Padmini Drummnod,*    Visit Date: 1/25/2024    Physician Orders: PT Eval and Treat PF PT  Medical Diagnosis from Referral: R10.2,G89.29 (ICD-10-CM) - Chronic pelvic pain in female  Evaluation Date: 1/19/2024  Authorization Period Expiration: 1/10/2025  Plan of Care Expiration: 4/12/2024  Progress Note Due: 2/16/2024  Visit # 2/12 Visits authorized: 1/20  FOTO: 1/3    Cancelled Visits: 0  No Show Visits: 0    Time In: 9:33 AM   Time Out: 10:14 AM   Total Billable Time: 41 minutes    Precautions: Standard    Subjective     Pt reports: patient states she had some pain with urination this morning and had a slow urine stream.    She was compliant with home exercise program.  Response to previous treatment: improved pain and pelvic floor awareness  Functional change: improved pelvic floor awareness    Pain in:  4/10  Pain out: 3/10  Location: pelvic pain       Objective     Kathy participated in neuromuscular re-education activities to develop Coordination, Control, Down training, and Proprioception for 30 minutes including: pelvic floor downtraining with assist of RUSI  and rehabilitative ultrasound imaging to help visualize pelvic floor muscle contraction and relaxation with kegels    Kathy participated in dynamic functional therapeutic activities to improve functional performance for 11  minutes, including:  Educated on double voiding before bed. Educated on urinating at least every 2 hours do decrease PF tension/burden.          Intervention Eval  01/25/2024           Neuro Re-Ed Rehabilitative ultrasound    493mL PVR 122mL   pelvic floor downtraining with assist of RUSI  and rehabilitative ultrasound imaging to help visualize pelvic floor muscle contraction  and relaxation with kegels     Manual Ther         TherAct     Educated on double voiding before bed. Educated on urinating at least every 2 hours do decrease PF tension/burden.           Home Exercises Provided and Patient Education Provided     Education provided:   - anatomy/physiology of pelvic floor, posture/body mechanices, diaphragmatic breathing, and double voiding techniques  Discussed progression of plan of care with patient; educated pt in activity modification; reviewed HEP with pt. Pt demonstrated and verbalized understanding of all instruction and was provided with a handout of HEP (see Patient Instructions).    Written Home Exercises Provided: yes.  Exercises were reviewed and Kathy was able to demonstrate them prior to the end of the session.  Kathy demonstrated good  understanding of the education provided.     See EMR under Patient Instructions for exercises provided 01/25/2024 .    Assessment     Patient with mild improvement with pain with treatment. Good understanding of physical therapist recommendations.     Kathy Is progressing well towards her goals.   Pt prognosis is Excellent.     Pt will continue to benefit from skilled outpatient physical therapy to address the deficits listed in the problem list box on initial evaluation, provide pt/family education and to maximize pt's level of independence in the home and community environment.     Pt's spiritual, cultural and educational needs considered and pt agreeable to plan of care and goals.     Anticipated barriers to physical therapy: non    Short Term Goals: 6 weeks   Patient will be independent with pelvic floor relaxation to improve bowel and bladder evacuation.   Patient will be able to demonstrate improved pelvic floor relaxation and contraction on rehabilitative ultrasound.   Patient will report improved water intake.     Long Term Goals: 12 weeks   Patient will report urinating every 3-4 hours with strong urine stream.   Patient will  deny pelvic pain with vaginal penetration.   Patient will deny pelvic pain at rest to allow for full tolerance for activities of choice.   Patient will demonstrate adequate pelvic floor coordination with contraction and relaxation on sEMG vs rehabilitative ultrasound.      Plan     Progressive PF mobility training, monitor PVR. Seeing Dr. Avila for further recommendations.     Keven Kirby, PT

## 2024-01-29 ENCOUNTER — CLINICAL SUPPORT (OUTPATIENT)
Dept: REHABILITATION | Facility: HOSPITAL | Age: 29
End: 2024-01-29
Attending: OBSTETRICS & GYNECOLOGY
Payer: COMMERCIAL

## 2024-01-29 DIAGNOSIS — G89.29 CHRONIC PELVIC PAIN IN FEMALE: Primary | ICD-10-CM

## 2024-01-29 DIAGNOSIS — R27.8 COORDINATION ABNORMAL: ICD-10-CM

## 2024-01-29 DIAGNOSIS — M62.838 MUSCLE SPASM: ICD-10-CM

## 2024-01-29 DIAGNOSIS — R10.2 CHRONIC PELVIC PAIN IN FEMALE: Primary | ICD-10-CM

## 2024-01-29 PROCEDURE — 97530 THERAPEUTIC ACTIVITIES: CPT | Mod: PN | Performed by: PHYSICAL THERAPIST

## 2024-01-29 PROCEDURE — 97112 NEUROMUSCULAR REEDUCATION: CPT | Mod: PN | Performed by: PHYSICAL THERAPIST

## 2024-01-29 PROCEDURE — 97140 MANUAL THERAPY 1/> REGIONS: CPT | Mod: PN | Performed by: PHYSICAL THERAPIST

## 2024-01-29 NOTE — PROGRESS NOTES
"  Pelvic Health Physical Therapy   Treatment Note     [facility]  [id number]    Therapy Diagnosis:   Encounter Diagnoses   Name Primary?    Chronic pelvic pain in female Yes    Coordination abnormal     Muscle spasm      Physician: Padmini Drummond,*    Visit Date: 1/29/2024    Physician Orders: PT Eval and Treat PF PT  Medical Diagnosis from Referral: R10.2,G89.29 (ICD-10-CM) - Chronic pelvic pain in female  Evaluation Date: 1/19/2024  Authorization Period Expiration: 1/10/2025  Plan of Care Expiration: 4/12/2024  Progress Note Due: 2/16/2024  Visit # 3/12 Visits authorized: 2/20  FOTO: 1/3    Cancelled Visits: 0  No Show Visits: 0    Time In: 10:24 AM   Time Out: 11:12 AM    Total Billable Time: 48  minutes    Precautions: Standard    Subjective     Pt reports: States that she started with pain over the weekend. Urine stream is about the same. Has been trying to relax the pelvic floor - has some hesitancy when her bladder is not that full.     She was compliant with home exercise program.  Response to previous treatment: improved pain and pelvic floor awareness  Functional change: improved pelvic floor awareness    Pain in:  6/10  Pain out: 4/10 "back to baseline"   Location: pelvic pain       Objective     Kathy participated in neuromuscular re-education activities to develop Coordination, Control, Down training, and Proprioception for 25 minutes including: pelvic floor downtraining with assist of RUSI  and rehabilitative ultrasound imaging to help visualize pelvic floor muscle contraction and relaxation with kegels  High PVR    Kathy participated in dynamic functional therapeutic activities to improve functional performance for  8 minutes, including:  Educated on double voiding before bed. Educated on leaning forward and giving pressure on the the bladder to better empty.     Kathy received the following manual therapy techniques: to develop flexibility and extensibility for 15 " minutes including: trigger point/myofascial release of bilateral bulbocavernosus            Intervention Eval  01/25/2024 01/29/2024           Neuro Re-Ed Rehabilitative ultrasound    493mL PVR 122mL   pelvic floor downtraining with assist of RUSI  and rehabilitative ultrasound imaging to help visualize pelvic floor muscle contraction and relaxation with kegels  PVR 163mL - went urinate in clinic still with 160mL - heard good urine stream  pelvic floor downtraining with assist of RUSI  and rehabilitative ultrasound imaging to help visualize pelvic floor muscle contraction and relaxation with kegels  High PVR   Manual Ther       trigger point/myofascial release of bilateral bulbocavernosus   TherAct     Educated on double voiding before bed. Educated on urinating at least every 2 hours do decrease PF tension/burden.    Educated on double voiding before bed. Educated on leaning forward and giving pressure on the the bladder to better empty.          Home Exercises Provided and Patient Education Provided     Education provided:   - anatomy/physiology of pelvic floor, posture/body mechanices, diaphragmatic breathing, and double voiding techniques  Discussed progression of plan of care with patient; educated pt in activity modification; reviewed HEP with pt. Pt demonstrated and verbalized understanding of all instruction and was provided with a handout of HEP (see Patient Instructions).    Written Home Exercises Provided: yes.  Exercises were reviewed and Kathy was able to demonstrate them prior to the end of the session.  Kathy demonstrated good  understanding of the education provided.     See EMR under Patient Instructions for exercises provided 01/29/2024 .    Assessment     Patient with mild improvement with pain with treatment. Good understanding of physical therapist recommendations.     Kathy Is progressing well towards her goals.   Pt prognosis is Excellent.     Pt will continue to benefit from skilled  outpatient physical therapy to address the deficits listed in the problem list box on initial evaluation, provide pt/family education and to maximize pt's level of independence in the home and community environment.     Pt's spiritual, cultural and educational needs considered and pt agreeable to plan of care and goals.     Anticipated barriers to physical therapy: non    Short Term Goals: 6 weeks   Patient will be independent with pelvic floor relaxation to improve bowel and bladder evacuation.   Patient will be able to demonstrate improved pelvic floor relaxation and contraction on rehabilitative ultrasound.   Patient will report improved water intake.     Long Term Goals: 12 weeks   Patient will report urinating every 3-4 hours with strong urine stream.   Patient will deny pelvic pain with vaginal penetration.   Patient will deny pelvic pain at rest to allow for full tolerance for activities of choice.   Patient will demonstrate adequate pelvic floor coordination with contraction and relaxation on sEMG vs rehabilitative ultrasound.      Plan     Progressive PF mobility training, monitor PVR. Seeing Dr. Avila for further recommendations. Discuss vaginal dilators/wand at home.     Keven Kirby, PT

## 2024-01-29 NOTE — PATIENT INSTRUCTIONS
Double void - most of the time especially before bed   - give gentle pressure over the bladder and lean forward   - give vibration over the bladder - tap with your hand or use GENTLE massage tool

## 2024-01-30 ENCOUNTER — OFFICE VISIT (OUTPATIENT)
Dept: INTERNAL MEDICINE | Facility: CLINIC | Age: 29
End: 2024-01-30
Payer: COMMERCIAL

## 2024-01-30 VITALS
HEIGHT: 68 IN | WEIGHT: 247.81 LBS | OXYGEN SATURATION: 100 % | DIASTOLIC BLOOD PRESSURE: 82 MMHG | SYSTOLIC BLOOD PRESSURE: 118 MMHG | HEART RATE: 60 BPM | RESPIRATION RATE: 16 BRPM | BODY MASS INDEX: 37.56 KG/M2

## 2024-01-30 DIAGNOSIS — E66.09 CLASS 2 OBESITY DUE TO EXCESS CALORIES WITHOUT SERIOUS COMORBIDITY WITH BODY MASS INDEX (BMI) OF 37.0 TO 37.9 IN ADULT: Primary | ICD-10-CM

## 2024-01-30 DIAGNOSIS — R10.2 CHRONIC PELVIC PAIN IN FEMALE: ICD-10-CM

## 2024-01-30 DIAGNOSIS — K59.09 CHRONIC CONSTIPATION: ICD-10-CM

## 2024-01-30 DIAGNOSIS — G89.29 CHRONIC PELVIC PAIN IN FEMALE: ICD-10-CM

## 2024-01-30 DIAGNOSIS — F50.81 BINGE EATING DISORDER: ICD-10-CM

## 2024-01-30 DIAGNOSIS — F32.5 MAJOR DEPRESSIVE DISORDER WITH SINGLE EPISODE, IN FULL REMISSION: ICD-10-CM

## 2024-01-30 PROBLEM — F50.819 BINGE EATING DISORDER: Status: ACTIVE | Noted: 2024-01-30

## 2024-01-30 LAB
AMPHET+METHAMPHET UR QL: NEGATIVE
BARBITURATES UR QL SCN>200 NG/ML: NEGATIVE
BENZODIAZ UR QL SCN>200 NG/ML: NEGATIVE
BZE UR QL SCN: NEGATIVE
CANNABINOIDS UR QL SCN: NEGATIVE
CREAT UR-MCNC: 23.4 MG/DL (ref 15–325)
METHADONE UR QL SCN>300 NG/ML: NEGATIVE
OPIATES UR QL SCN: NEGATIVE
PCP UR QL SCN>25 NG/ML: NEGATIVE
TOXICOLOGY INFORMATION: NORMAL

## 2024-01-30 PROCEDURE — 99214 OFFICE O/P EST MOD 30 MIN: CPT | Mod: S$GLB,,, | Performed by: INTERNAL MEDICINE

## 2024-01-30 PROCEDURE — 1159F MED LIST DOCD IN RCRD: CPT | Mod: CPTII,S$GLB,, | Performed by: INTERNAL MEDICINE

## 2024-01-30 PROCEDURE — 1160F RVW MEDS BY RX/DR IN RCRD: CPT | Mod: CPTII,S$GLB,, | Performed by: INTERNAL MEDICINE

## 2024-01-30 PROCEDURE — 80307 DRUG TEST PRSMV CHEM ANLYZR: CPT | Performed by: INTERNAL MEDICINE

## 2024-01-30 PROCEDURE — 3008F BODY MASS INDEX DOCD: CPT | Mod: CPTII,S$GLB,, | Performed by: INTERNAL MEDICINE

## 2024-01-30 PROCEDURE — 99999 PR PBB SHADOW E&M-EST. PATIENT-LVL IV: CPT | Mod: PBBFAC,,, | Performed by: INTERNAL MEDICINE

## 2024-01-30 PROCEDURE — 3079F DIAST BP 80-89 MM HG: CPT | Mod: CPTII,S$GLB,, | Performed by: INTERNAL MEDICINE

## 2024-01-30 PROCEDURE — 3074F SYST BP LT 130 MM HG: CPT | Mod: CPTII,S$GLB,, | Performed by: INTERNAL MEDICINE

## 2024-01-30 NOTE — PROGRESS NOTES
Subjective:       Patient ID: Kathy Beltrán is a 28 y.o. female.    Chief Complaint: Follow-up      HPI:  Patient is known to me and presents for follow up weight management. She has obesity, MDD and irritable bowel with chronic constipation. Labs from 11/24/23 personally reviewed, interperted and discussed today.     A1C 4.8%, normal  LDL 74, normal  GFR > 60, normal  TSH 1.5, normal  Vit D 38, normal    Last visit we started Vyvanse for binge-eating disorder. She is doing well on current dose. Agreeable to UDS today   Weight stable since last visit: 247lb today and 248lb last visit. She notes she had recent diagnostic procedure for endometriosis given her chronic pelvic pain and weight fluctuating this week. Prior at home weight was 230lb  LOSING INCHES, rings are loose, clothes fitting looser    She was on Mounjaro and at last visit had reported she lost 50 pounds in 6 months.   Has been taking 7.5mg weekly since Nov 2022. Denies medication side effects.   We planned to increase dose to 10mg weekly last visit but cost has increased to $500/month. No significant weight loss since last visit.  Has struggled with obesity most of her adult life and never had this much success.   Denies personal or FH of thyroid cancer, pancreatic cancer, pancreatitis.    Wegovy not available. Referred for bariatric surgery but not covered as BMI not > 39.         Feb 2023 started on protonix. Saw GI and sx are improved and has weaned off since last visit.      Depresison: on lamictal and wellbutrin. Working well. Follows with psychiatry.      Irritable bowel with constipation: on linzess. Working ok.  Previously BM every 7-10 day  Now BM twice a week     PAP-establishedwith Dr. Kelly     Tobacco use: denies use  EtOH use: juaquin    Past Medical History:   Diagnosis Date    Hydradenitis        Family History   Problem Relation Age of Onset    Hypertension Mother     Heart disease Mother     Asthma Mother     Hypertension Father      Heart disease Father     Hypertension Maternal Grandmother     Heart disease Maternal Grandmother     Thyroid disease Maternal Grandmother     Hypertension Maternal Grandfather     Heart disease Maternal Grandfather     Asthma Sister        Social History     Socioeconomic History    Marital status:    Tobacco Use    Smoking status: Never     Passive exposure: Never    Smokeless tobacco: Never   Substance and Sexual Activity    Alcohol use: Yes     Comment: Social drinker    Drug use: Never    Sexual activity: Yes     Partners: Male     Birth control/protection: I.U.D.     Social Determinants of Health     Financial Resource Strain: Patient Declined (11/27/2023)    Overall Financial Resource Strain (CARDIA)     Difficulty of Paying Living Expenses: Patient declined   Food Insecurity: Patient Declined (11/27/2023)    Hunger Vital Sign     Worried About Running Out of Food in the Last Year: Patient declined     Ran Out of Food in the Last Year: Patient declined   Transportation Needs: No Transportation Needs (11/27/2023)    PRAPARE - Transportation     Lack of Transportation (Medical): No     Lack of Transportation (Non-Medical): No   Physical Activity: Unknown (11/27/2023)    Exercise Vital Sign     Days of Exercise per Week: Patient declined   Stress: Patient Declined (11/27/2023)    [employer] [place] [employer] - Occupational Stress Questionnaire     Feeling of Stress : Patient declined   Social Connections: Unknown (11/27/2023)    Social Connection and Isolation Panel [NHANES]     Frequency of Communication with Friends and Family: More than three times a week     Frequency of Social Gatherings with Friends and Family: More than three times a week     Active Member of Clubs or Organizations: No     Attends Club or Organization Meetings: Patient declined     Marital Status:    Housing Stability: Low Risk  (11/27/2023)    Housing Stability Vital Sign     Unable to Pay for Housing in the  Last Year: No     Number of Places Lived in the Last Year: 1     Unstable Housing in the Last Year: No       Review of Systems   Constitutional:  Negative for activity change, fatigue, fever and unexpected weight change.   HENT:  Negative for congestion, ear pain, hearing loss, rhinorrhea and sore throat.    Eyes:  Negative for pain, redness and visual disturbance.   Respiratory:  Negative for cough, shortness of breath and wheezing.    Cardiovascular:  Negative for chest pain, palpitations and leg swelling.   Gastrointestinal:  Positive for constipation (chronic). Negative for abdominal pain, diarrhea, nausea and vomiting.   Genitourinary:  Negative for dysuria, frequency and urgency.   Musculoskeletal:  Negative for back pain, joint swelling and neck pain.   Skin:  Negative for color change, rash and wound.   Neurological:  Negative for dizziness, tremors, weakness, light-headedness and headaches.         Objective:      Physical Exam  Vitals reviewed.   Constitutional:       General: She is not in acute distress.     Appearance: She is well-developed. She is obese.   HENT:      Head: Normocephalic and atraumatic.      Right Ear: External ear normal.      Left Ear: External ear normal.      Nose: Nose normal.   Eyes:      General:         Right eye: No discharge.         Left eye: No discharge.      Conjunctiva/sclera: Conjunctivae normal.   Neck:      Thyroid: No thyromegaly.   Cardiovascular:      Rate and Rhythm: Normal rate and regular rhythm.   Pulmonary:      Effort: Pulmonary effort is normal. No respiratory distress.   Skin:     General: Skin is warm and dry.   Neurological:      Mental Status: She is alert and oriented to person, place, and time.   Psychiatric:         Behavior: Behavior normal.         Thought Content: Thought content normal.         Assessment:       1. Class 2 obesity due to excess calories without serious comorbidity with body mass index (BMI) of 37.0 to 37.9 in adult    2. Binge  eating disorder    3. Major depressive disorder with single episode, in full remission    4. Chronic pelvic pain in female    5. Chronic constipation        Plan:       1. Class 2 obesity due to excess calories without serious comorbidity with body mass index (BMI) of 37.0 to 37.9 in adult  Chronic improving  Cont diet, exercise, weight loss  Cont vyvanse for binge eating disorder    2. Binge eating disorder  Chronic stable  BMI down trending  UDS today for monitoring   reviewed, no discrepancies  Cont vyvanse  -     Drug screen panel, in-house; Future; Expected date: 01/30/2024    3. Major depressive disorder with single episode, in full remission  Chronic stable  Cont meds same dose    4. Chronic pelvic pain in female  Chronic stable  Cont work up with GYN and pelvic floor therapy    5. Chronic constipation  Chronic controlled  Cont linzess same dose       RTC 6 months and PRN

## 2024-02-02 ENCOUNTER — PATIENT MESSAGE (OUTPATIENT)
Dept: INTERNAL MEDICINE | Facility: CLINIC | Age: 29
End: 2024-02-02
Payer: COMMERCIAL

## 2024-02-08 ENCOUNTER — CLINICAL SUPPORT (OUTPATIENT)
Dept: REHABILITATION | Facility: HOSPITAL | Age: 29
End: 2024-02-08
Attending: OBSTETRICS & GYNECOLOGY
Payer: COMMERCIAL

## 2024-02-08 DIAGNOSIS — M62.838 MUSCLE SPASM: ICD-10-CM

## 2024-02-08 DIAGNOSIS — G89.29 CHRONIC PELVIC PAIN IN FEMALE: Primary | ICD-10-CM

## 2024-02-08 DIAGNOSIS — R10.2 CHRONIC PELVIC PAIN IN FEMALE: Primary | ICD-10-CM

## 2024-02-08 DIAGNOSIS — R27.8 COORDINATION ABNORMAL: ICD-10-CM

## 2024-02-08 PROCEDURE — 97140 MANUAL THERAPY 1/> REGIONS: CPT | Mod: PN | Performed by: PHYSICAL THERAPIST

## 2024-02-08 PROCEDURE — 97530 THERAPEUTIC ACTIVITIES: CPT | Mod: PN | Performed by: PHYSICAL THERAPIST

## 2024-02-08 PROCEDURE — 97112 NEUROMUSCULAR REEDUCATION: CPT | Mod: PN | Performed by: PHYSICAL THERAPIST

## 2024-02-08 NOTE — PATIENT INSTRUCTIONS
- Happy baby, jane pose - very good for pelvic floor mobility  - car - seated figure 4 and lean forward

## 2024-02-08 NOTE — PROGRESS NOTES
"  Pelvic Health Physical Therapy   Treatment Note     [facility]  [facility] Number[id number]    Therapy Diagnosis:   Encounter Diagnoses   Name Primary?    Chronic pelvic pain in female Yes    Coordination abnormal     Muscle spasm      Physician: Padmini Drummond,*    Visit Date: 2/8/2024    Physician Orders: PT Eval and Treat PF PT  Medical Diagnosis from Referral: R10.2,G89.29 (ICD-10-CM) - Chronic pelvic pain in female  Evaluation Date: 1/19/2024  Authorization Period Expiration: 1/10/2025  Plan of Care Expiration: 4/12/2024  Progress Note Due: 2/16/2024  Visit # 4/12 Visits authorized: 3/20  FOTO: 1/3    Cancelled Visits: 0  No Show Visits: 0    Time In: 1:50 PM   Time Out: 2:34 PM    Total Billable Time: 44  minutes    Precautions: Standard    Subjective     Pt reports: Pain is a little better than normal. "It is a pretty good day" Not much of a change in regards to urine stream. Trying to pee more often. Made it to 4 am without going urinate. Weaker stream and longer to start when bladder curtis.     She was compliant with home exercise program.  Response to previous treatment: improved pain and pelvic floor awareness  Functional change: improved pelvic floor awareness    Pain in:  2/10  Pain out: 4/10 after manual therapy  Location: pelvic pain       Objective     Kathy participated in neuromuscular re-education activities to develop Coordination, Control, Down training, and Proprioception for 10 minutes including: pelvic floor downtraining with assist of RUSI  and rehabilitative ultrasound imaging to help visualize pelvic floor muscle contraction and relaxation with kegels  VPR 112mL    Kathy participated in dynamic functional therapeutic activities to improve functional performance for 10 minutes, including:  Educated posture while in the car, educated on stretches to assist with PF mobility and pain.     Kathy received the following manual therapy techniques: to develop flexibility and " extensibility for 24 minutes including: trigger point/myofascial release of bilateral bulbocavernosus  with contract/relax.            Intervention Eval  01/25/2024 01/29/2024 02/08/2024            Neuro Re-Ed Rehabilitative ultrasound    493mL PVR 122mL   pelvic floor downtraining with assist of RUSI  and rehabilitative ultrasound imaging to help visualize pelvic floor muscle contraction and relaxation with kegels  PVR 163mL - went urinate in clinic still with 160mL - heard good urine stream  pelvic floor downtraining with assist of RUSI  and rehabilitative ultrasound imaging to help visualize pelvic floor muscle contraction and relaxation with kegels  High PVR PVR = 112mL - PF coordination   Manual Ther       trigger point/myofascial release of bilateral bulbocavernosus  trigger point/myofascial release of bilateral bulbocavernosus with contract/relax.   TherAct     Educated on double voiding before bed. Educated on urinating at least every 2 hours do decrease PF tension/burden.    Educated on double voiding before bed. Educated on leaning forward and giving pressure on the the bladder to better empty.    Educated posture while in the car, educated on stretches to assist with PF mobility and pain.          Home Exercises Provided and Patient Education Provided     Education provided:   - anatomy/physiology of pelvic floor, posture/body mechanices, diaphragmatic breathing, and double voiding techniques  Discussed progression of plan of care with patient; educated pt in activity modification; reviewed HEP with pt. Pt demonstrated and verbalized understanding of all instruction and was provided with a handout of HEP (see Patient Instructions).    Written Home Exercises Provided: yes.  Exercises were reviewed and Kathy was able to demonstrate them prior to the end of the session.  Kathy demonstrated good  understanding of the education provided.     See EMR under Patient Instructions for exercises provided 02/08/2024  .    Assessment     Patient with mild improvement with pain with treatment. Good understanding of physical therapist recommendations. Increased pain after manual therapy today.     Kathy Is progressing well towards her goals.   Pt prognosis is Excellent.     Pt will continue to benefit from skilled outpatient physical therapy to address the deficits listed in the problem list box on initial evaluation, provide pt/family education and to maximize pt's level of independence in the home and community environment.     Pt's spiritual, cultural and educational needs considered and pt agreeable to plan of care and goals.     Anticipated barriers to physical therapy: non    Short Term Goals: 6 weeks   Patient will be independent with pelvic floor relaxation to improve bowel and bladder evacuation.   Patient will be able to demonstrate improved pelvic floor relaxation and contraction on rehabilitative ultrasound.   Patient will report improved water intake.     Long Term Goals: 12 weeks   Patient will report urinating every 3-4 hours with strong urine stream.   Patient will deny pelvic pain with vaginal penetration.   Patient will deny pelvic pain at rest to allow for full tolerance for activities of choice.   Patient will demonstrate adequate pelvic floor coordination with contraction and relaxation on sEMG vs rehabilitative ultrasound.      Plan     Progressive PF mobility training, monitor PVR. Seeing Dr. Avila for further recommendations.   Keven Kirby, PT

## 2024-02-19 DIAGNOSIS — F50.81 BINGE EATING DISORDER: ICD-10-CM

## 2024-02-19 NOTE — TELEPHONE ENCOUNTER
No care due was identified.  [facility] Embedded Care Due Messages. Reference number: [id number].   2/19/2024 4:17:17 PM CST

## 2024-02-20 RX ORDER — LISDEXAMFETAMINE DIMESYLATE 30 MG/1
30 CAPSULE ORAL EVERY MORNING
Qty: 30 CAPSULE | Refills: 0 | Status: SHIPPED | OUTPATIENT
Start: 2024-02-20 | End: 2024-03-21 | Stop reason: SDUPTHER

## 2024-02-22 ENCOUNTER — OFFICE VISIT (OUTPATIENT)
Dept: OBSTETRICS AND GYNECOLOGY | Facility: CLINIC | Age: 29
End: 2024-02-22
Payer: COMMERCIAL

## 2024-02-22 ENCOUNTER — CLINICAL SUPPORT (OUTPATIENT)
Dept: REHABILITATION | Facility: HOSPITAL | Age: 29
End: 2024-02-22
Attending: OBSTETRICS & GYNECOLOGY
Payer: COMMERCIAL

## 2024-02-22 VITALS
SYSTOLIC BLOOD PRESSURE: 111 MMHG | WEIGHT: 244.5 LBS | BODY MASS INDEX: 37.05 KG/M2 | HEIGHT: 68 IN | DIASTOLIC BLOOD PRESSURE: 86 MMHG

## 2024-02-22 DIAGNOSIS — M62.838 MUSCLE SPASM: ICD-10-CM

## 2024-02-22 DIAGNOSIS — G89.29 CHRONIC PELVIC PAIN IN FEMALE: Primary | ICD-10-CM

## 2024-02-22 DIAGNOSIS — R10.2 CHRONIC PELVIC PAIN IN FEMALE: Primary | ICD-10-CM

## 2024-02-22 DIAGNOSIS — R27.8 COORDINATION ABNORMAL: ICD-10-CM

## 2024-02-22 DIAGNOSIS — R10.2 FEMALE PELVIC PAIN: Primary | ICD-10-CM

## 2024-02-22 PROCEDURE — 3074F SYST BP LT 130 MM HG: CPT | Mod: CPTII,S$GLB,, | Performed by: OBSTETRICS & GYNECOLOGY

## 2024-02-22 PROCEDURE — 97530 THERAPEUTIC ACTIVITIES: CPT | Mod: PN | Performed by: PHYSICAL THERAPIST

## 2024-02-22 PROCEDURE — 3008F BODY MASS INDEX DOCD: CPT | Mod: CPTII,S$GLB,, | Performed by: OBSTETRICS & GYNECOLOGY

## 2024-02-22 PROCEDURE — 1159F MED LIST DOCD IN RCRD: CPT | Mod: CPTII,S$GLB,, | Performed by: OBSTETRICS & GYNECOLOGY

## 2024-02-22 PROCEDURE — 97112 NEUROMUSCULAR REEDUCATION: CPT | Mod: PN | Performed by: PHYSICAL THERAPIST

## 2024-02-22 PROCEDURE — 99999 PR PBB SHADOW E&M-EST. PATIENT-LVL III: CPT | Mod: PBBFAC,,, | Performed by: OBSTETRICS & GYNECOLOGY

## 2024-02-22 PROCEDURE — 3079F DIAST BP 80-89 MM HG: CPT | Mod: CPTII,S$GLB,, | Performed by: OBSTETRICS & GYNECOLOGY

## 2024-02-22 PROCEDURE — 99214 OFFICE O/P EST MOD 30 MIN: CPT | Mod: S$GLB,,, | Performed by: OBSTETRICS & GYNECOLOGY

## 2024-02-22 PROCEDURE — 1160F RVW MEDS BY RX/DR IN RCRD: CPT | Mod: CPTII,S$GLB,, | Performed by: OBSTETRICS & GYNECOLOGY

## 2024-02-22 PROCEDURE — 97140 MANUAL THERAPY 1/> REGIONS: CPT | Mod: PN | Performed by: PHYSICAL THERAPIST

## 2024-02-22 RX ORDER — NORETHINDRONE 5 MG/1
5 TABLET ORAL DAILY
Qty: 30 TABLET | Refills: 11 | Status: SHIPPED | OUTPATIENT
Start: 2024-02-22 | End: 2025-05-22

## 2024-02-22 NOTE — PROGRESS NOTES
Subjective:      Patient ID: Kathy Beltrán is a 28 y.o. female.    Chief Complaint:  Consult      History of Present Illness  Pelvic Pain  The patient's primary symptoms include pelvic pain. The patient's pertinent negatives include no vaginal discharge. This is a chronic problem. The current episode started more than 1 year ago. The problem occurs daily. The problem has been gradually worsening. The pain is moderate. The problem affects both sides. She is not pregnant. Associated symptoms include abdominal pain, back pain, constipation and painful intercourse. Pertinent negatives include no anorexia, chills, diarrhea, discolored urine, dysuria, fever, flank pain, frequency, headaches, hematuria, nausea, rash, urgency or vomiting. The symptoms are aggravated by bowel movements, intercourse, tactile pressure and menstrual cycle. She has tried acetaminophen, antibiotics, heating pad, NSAIDs, oral narcotics and warm bath for the symptoms. The treatment provided mild relief. She is sexually active. No, her partner does not have an STD. She uses an IUD for contraception. Her menstrual history has been irregular. Her past medical history is significant for an abdominal surgery, a  section, menorrhagia, metrorrhagia, miscarriage and ovarian cysts. There is no history of an ectopic pregnancy, endometriosis, a gynecological surgery, herpes simplex, perineal abscess, PID, an STD, a terminated pregnancy or vaginosis.     Patient is a 28-year-old who is here in consultation for pelvic pain.  She has a long history of painful cycles.  She started having menstrual cycles at age 11.  She did miss work and school because of this.  She was on birth control pills for cycle regulation but it did not improve her pain at that time.  She does have pain with intercourse.  It is not consistent but she does have pain with deep penetration.  She also has pain with bowel movement and notices pain when she has been sitting for a  long time.  She does have episodes of pain with a full bladder.    She recently had laparoscopy at which time no endometriosis was seen.  She has been evaluated by Gastroenterology.  They have put her on a special diet and most recently she was started on Linzess.    She is scheduled to see pelvic floor physical therapy.  Here for better understanding of why she is having pain.    GYN & OB History  No LMP recorded (lmp unknown). (Menstrual status: Birth Control).   Date of Last Pap: 2023    OB History    Para Term  AB Living   3 1 1   2 1   SAB IAB Ectopic Multiple Live Births   2       1      # Outcome Date GA Lbr [place]/2nd Weight Sex Delivery Anes PTL Lv   3 Term     M CS-Unspec  N JOSE DE JESUS   2 2020           1 2017               Review of Systems  Review of Systems   Constitutional:  Negative for activity change, appetite change, chills, fatigue and fever.   HENT: Negative.  Negative for tinnitus.    Eyes: Negative.    Respiratory:  Negative for cough and shortness of breath.    Cardiovascular:  Negative for chest pain and palpitations.   Gastrointestinal:  Positive for abdominal pain and constipation. Negative for anorexia, blood in stool, diarrhea, nausea and vomiting.   Endocrine: Negative.  Negative for hot flashes.   Genitourinary:  Positive for menorrhagia and pelvic pain. Negative for dysmenorrhea, dyspareunia, dysuria, flank pain, frequency, hematuria, menstrual problem, urgency, vaginal discharge, urinary incontinence and postcoital bleeding.   Musculoskeletal:  Positive for back pain. Negative for joint swelling.   Integumentary:  Negative for rash, breast mass, nipple discharge and breast skin changes.   Neurological: Negative.  Negative for headaches.   Hematological: Negative.  Does not bruise/bleed easily.   Psychiatric/Behavioral:  The patient is not nervous/anxious.    Breast: negative.  Negative for mass, nipple discharge and skin changes         Objective:     Physical  Exam:   Constitutional: She is oriented to person, place, and time. She appears well-developed and well-nourished. No distress.    HENT:   Head: Normocephalic and atraumatic.    Eyes: Pupils are equal, round, and reactive to light. Conjunctivae and lids are normal.     Cardiovascular:  Normal rate and regular rhythm.      Exam reveals no edema.        Pulmonary/Chest: Effort normal.        Abdominal: Soft. Bowel sounds are normal. She exhibits no distension. There is no abdominal tenderness. There is no rebound and no guarding.     Genitourinary:    Vagina, uterus, right adnexa and left adnexa normal.      Pelvic exam was performed with patient supine.   The external female genitalia was normal.     Labial bartholins normal.There is no tenderness or lesion on the right labia. There is no tenderness or lesion on the left labia. Cervix is normal. Right adnexum displays no mass and no tenderness. Left adnexum displays no mass and no tenderness. No vaginal discharge, rectocele, cystocele or prolapse of vaginal walls in the vagina. Cervix exhibits no motion tenderness and no discharge. Uterus is not deviated, not fixed and not hosting fibroids. Normal urethral meatus.Urethra findings: no tendernessBladder findings: no bladder tenderness   Genitourinary Comments: General guarding of pelvis but no elevated tone on one side greater than another.             Musculoskeletal: Normal range of motion and moves all extremeties.       Neurological: She is alert and oriented to person, place, and time. She has normal strength.    Skin: Skin is warm and dry.    Psychiatric: She has a normal mood and affect. Her speech is normal and behavior is normal. Thought content normal. Her mood appears not anxious. She does not exhibit a depressed mood. She expresses no suicidal plans and no homicidal plans.         Assessment:     1. Female pelvic pain               Plan:     Kathy was seen today for consult.    Diagnoses and all orders for  this visit:    Female pelvic pain  -     norethindrone (AYGESTIN) 5 mg Tab; Take 1 tablet (5 mg total) by mouth once daily.  -     LIDOCAINE 2 %, VALIUM 5 MG, BACLOFEN 4 % SUPPOSITORY; Place 1 suppository vaginally 2 (two) times daily.      We had a long discussion today lasting approximately 45 minutes reviewing her past surgery and history.  If this is related to microscopic endometriosis, the addition of norethindrone would help.  There was also hypervascular areas around the uterus that are suspicious for pelvic congestion syndrome.  The medication will help with this as well.  I would also consider vaginal Valium to aid with any pelvic spasm that happens after pelvic floor physical therapy.  Patient will keep a pain diary and reassess in 3 months.  All questions were answered.    We did discuss how if her pain has not improved over the next 6-12 months, a repeat laparoscopy could be considered.

## 2024-02-22 NOTE — PROGRESS NOTES
Pelvic Health Physical Therapy   Treatment Note and Progress Note     Name: Kathy Palomo angelika  Clinic Number: [id number]    Therapy Diagnosis:   Encounter Diagnoses   Name Primary?    Chronic pelvic pain in female Yes    Coordination abnormal     Muscle spasm      Physician: Padmini Drummond,*    Visit Date: 2/22/2024    Physician Orders: PT Eval and Treat PF PT  Medical Diagnosis from Referral: R10.2,G89.29 (ICD-10-CM) - Chronic pelvic pain in female  Evaluation Date: 1/19/2024  Authorization Period Expiration: 1/10/2025  Plan of Care Expiration: 4/12/2024  Progress Note Due: 3/21/2024  Visit # 5/12 Visits authorized: 4/20  FOTO: 1/3    Cancelled Visits: 0  No Show Visits: 0    Time In: 11:51 AM   Time Out: 12:29 PM    Total Billable Time: 38   minutes    Precautions: Standard    Subjective     Pt reports: Saw Dr. Avila this morning. He did write her a script for vaginal suppositories. She will also be starting progesterone. Nocturia is going pretty well. Feels like she is urinating less. Frequency during the day is ok. Urine stream has been mostly normal lately. Has been able to relax when she is urinating - the last 3 days.       She was compliant with home exercise program.  Response to previous treatment: improved pain and pelvic floor awareness  Functional change: improved pelvic floor awareness    Pain in:  6/10  Pain out: 4-5 /10 after manual therapy  Location: pelvic pain       Objective     Kathy participated in neuromuscular re-education activities to develop Coordination, Control, Down training, and Proprioception for 10  minutes including: pelvic floor downtraining with assist of RUSI  and rehabilitative ultrasound imaging to help visualize pelvic floor muscle contraction and relaxation with kegels  Bladder volume of 319mL - urinated in the lobby. Urinated in clinic with much better PVR. PF down training education.     Kathy participated in dynamic functional therapeutic activities to improve  functional performance for 18 minutes, including:  Educated double voiding most of the time to assist with bladder evacuation. Encouraged use of vaginal dilator after use of suppository.     Kathy received the following manual therapy techniques: to develop flexibility and extensibility for 10 minutes including: lower extremity ROM, single knee to chest, hip internal rotation/external rotation, bilateral gastroc stretch       Intervention Eval  01/25/2024 01/29/2024 02/08/2024 02/22/2024             Neuro Re-Ed Rehabilitative ultrasound    493mL PVR 122mL   pelvic floor downtraining with assist of RUSI  and rehabilitative ultrasound imaging to help visualize pelvic floor muscle contraction and relaxation with kegels  PVR 163mL - went urinate in clinic still with 160mL - heard good urine stream  pelvic floor downtraining with assist of RUSI  and rehabilitative ultrasound imaging to help visualize pelvic floor muscle contraction and relaxation with kegels  High PVR PVR = 112mL - PF coordination 319mL - urinated in the lobby  pelvic floor downtraining with assist of RUSI  and rehabilitative ultrasound imaging to help visualize pelvic floor muscle contraction and relaxation with kegels  Bladder volume of 319mL - urinated in the lobby. Urinated in clinic with much better PVR. PF down training education.        Manual Ther       trigger point/myofascial release of bilateral bulbocavernosus  trigger point/myofascial release of bilateral bulbocavernosus with contract/relax. lower extremity ROM, single knee to chest, hip internal rotation/external rotation, bilateral gastroc stretch   TherAct     Educated on double voiding before bed. Educated on urinating at least every 2 hours do decrease PF tension/burden.    Educated on double voiding before bed. Educated on leaning forward and giving pressure on the the bladder to better empty.    Educated posture while in the car, educated on stretches to assist with PF mobility and  pain.    Educated double voiding most of the time to assist with bladder evacuation. Encouraged use of vaginal dilator after use of suppository.        Home Exercises Provided and Patient Education Provided     Education provided:   - anatomy/physiology of pelvic floor, posture/body mechanices, diaphragmatic breathing, and double voiding techniques  Discussed progression of plan of care with patient; educated pt in activity modification; reviewed HEP with pt. Pt demonstrated and verbalized understanding of all instruction and was provided with a handout of HEP (see Patient Instructions).    Written Home Exercises Provided: yes.  Exercises were reviewed and Kathy was able to demonstrate them prior to the end of the session.  Kathy demonstrated good  understanding of the education provided.     See EMR under Patient Instructions for exercises provided 02/22/2024 .    Assessment     Patient with mild improvement with pain with treatment. Good understanding of physical therapist recommendations. Increased pain after vaginal exam with Dr. Avila today.     Kathy Is progressing well towards her goals.   Pt prognosis is Excellent.     Pt will continue to benefit from skilled outpatient physical therapy to address the deficits listed in the problem list box on initial evaluation, provide pt/family education and to maximize pt's level of independence in the home and community environment.     Pt's spiritual, cultural and educational needs considered and pt agreeable to plan of care and goals.     Anticipated barriers to physical therapy: non    Short Term Goals: 6 weeks   Patient will be independent with pelvic floor relaxation to improve bowel and bladder evacuation. Goal not met - progressing   Patient will be able to demonstrate improved pelvic floor relaxation and contraction on rehabilitative ultrasound. Goal not met - progressing   Patient will report improved water intake. Goal met 02/22/2024      Long Term Goals: 12  weeks   Patient will report urinating every 3-4 hours with strong urine stream. Goal not met - progressing - urinating every 1-2 hours  Patient will deny pelvic pain with vaginal penetration. Goal not met - progressing   Patient will deny pelvic pain at rest to allow for full tolerance for activities of choice. Goal not met - progressing   Patient will demonstrate adequate pelvic floor coordination with contraction and relaxation on sEMG vs rehabilitative ultrasound.  Goal not met - progressing     Plan     Progressive PF mobility training, monitor PVR. Monitor tolerance for vaginal suppositories.   Keven Kirby, PT

## 2024-03-06 ENCOUNTER — CLINICAL SUPPORT (OUTPATIENT)
Dept: REHABILITATION | Facility: HOSPITAL | Age: 29
End: 2024-03-06
Payer: COMMERCIAL

## 2024-03-06 DIAGNOSIS — G89.29 CHRONIC PELVIC PAIN IN FEMALE: Primary | ICD-10-CM

## 2024-03-06 DIAGNOSIS — M62.838 MUSCLE SPASM: ICD-10-CM

## 2024-03-06 DIAGNOSIS — R10.2 CHRONIC PELVIC PAIN IN FEMALE: Primary | ICD-10-CM

## 2024-03-06 DIAGNOSIS — R27.8 COORDINATION ABNORMAL: ICD-10-CM

## 2024-03-06 PROCEDURE — 97140 MANUAL THERAPY 1/> REGIONS: CPT | Mod: PN | Performed by: PHYSICAL THERAPIST

## 2024-03-06 PROCEDURE — 97530 THERAPEUTIC ACTIVITIES: CPT | Mod: PN | Performed by: PHYSICAL THERAPIST

## 2024-03-06 PROCEDURE — 97112 NEUROMUSCULAR REEDUCATION: CPT | Mod: PN | Performed by: PHYSICAL THERAPIST

## 2024-03-06 NOTE — PROGRESS NOTES
Pelvic Health Physical Therapy   Treatment Note      [facility]  [id number]    Therapy Diagnosis:   Encounter Diagnoses   Name Primary?    Chronic pelvic pain in female Yes    Muscle spasm     Coordination abnormal      Physician: Padmini Drummond,*    Visit Date: 3/6/2024    Physician Orders: PT Eval and Treat PF PT  Medical Diagnosis from Referral: R10.2,G89.29 (ICD-10-CM) - Chronic pelvic pain in female  Evaluation Date: 1/19/2024  Authorization Period Expiration: 1/10/2025  Plan of Care Expiration: 4/12/2024  Progress Note Due: 3/21/2024  Visit # 6/12 Visits authorized: 5/20  FOTO: 1/3    Cancelled Visits: 0  No Show Visits: 0    Time In: 2:47 PM   Time Out: 3:24 PM    Total Billable Time: 37  minutes    Precautions: Standard    Subjective     Pt reports: Pain started Thursday night and over the weekend. 10/10. Trying to not use suppositories a lot. Was up a lot last night in the bathroom. Frequency of urination was better prior to this pain exacerbation. Pain has been more rectal on her left side. Bowel movements have been ok.     She was compliant with home exercise program.  Response to previous treatment: improved pain and pelvic floor awareness  Functional change: improved pelvic floor awareness    Pain in:  6/10  Pain out: 5  /10 after manual therapy  Location: pelvic pain       Objective     Kathy participated in neuromuscular re-education activities to develop Coordination, Control, Down training, and Proprioception for 8  minutes including: pelvic floor downtraining with assist of RUSI  and rehabilitative ultrasound imaging to help visualize pelvic floor muscle contraction and relaxation with kegels  PF down training with manual therapy.     Kathy participated in dynamic functional therapeutic activities to improve functional performance for 14 minutes, including:  Educated use of vaginal suppositories to assist with pain. Educated on the benefits of Botox in the future.  Patient will discuss with MD. Deweyily received the following manual therapy techniques: to develop flexibility and extensibility for 15 minutes including: PF manual therapy with trigger point release to bilateral bulbocavernosus and bilateral levator ani.         Intervention Eval  01/25/2024 01/29/2024 02/08/2024 02/22/2024 03/06/2024              Neuro Re-Ed Rehabilitative ultrasound    493mL PVR 122mL   pelvic floor downtraining with assist of RUSI  and rehabilitative ultrasound imaging to help visualize pelvic floor muscle contraction and relaxation with kegels  PVR 163mL - went urinate in clinic still with 160mL - heard good urine stream  pelvic floor downtraining with assist of RUSI  and rehabilitative ultrasound imaging to help visualize pelvic floor muscle contraction and relaxation with kegels  High PVR PVR = 112mL - PF coordination 319mL - urinated in the lobby  pelvic floor downtraining with assist of RUSI  and rehabilitative ultrasound imaging to help visualize pelvic floor muscle contraction and relaxation with kegels  Bladder volume of 319mL - urinated in the lobby. Urinated in clinic with much better PVR. PF down training education.       pelvic floor downtraining with assist of RUSI  and rehabilitative ultrasound imaging to help visualize pelvic floor muscle contraction and relaxation with kegels  PF down training with manual therapy.    Manual Ther       trigger point/myofascial release of bilateral bulbocavernosus  trigger point/myofascial release of bilateral bulbocavernosus with contract/relax. lower extremity ROM, single knee to chest, hip internal rotation/external rotation, bilateral gastroc stretch PF manual therapy with trigger point release to bilateral bulbocavernosus and bilateral levator ani.    TherAct     Educated on double voiding before bed. Educated on urinating at least every 2 hours do decrease PF tension/burden.    Educated on double voiding before bed. Educated on leaning  forward and giving pressure on the the bladder to better empty.    Educated posture while in the car, educated on stretches to assist with PF mobility and pain.    Educated double voiding most of the time to assist with bladder evacuation. Encouraged use of vaginal dilator after use of suppository.  Educated use of vaginal suppositories to assist with pain. Educated on the benefits of Botox in the future. Patient will discuss with MD.        Home Exercises Provided and Patient Education Provided     Education provided:   - anatomy/physiology of pelvic floor, posture/body mechanices, diaphragmatic breathing, and double voiding techniques  Discussed progression of plan of care with patient; educated pt in activity modification; reviewed HEP with pt. Pt demonstrated and verbalized understanding of all instruction and was provided with a handout of HEP (see Patient Instructions).    Written Home Exercises Provided: yes.  Exercises were reviewed and Kathy was able to demonstrate them prior to the end of the session.  Kathy demonstrated good  understanding of the education provided.     See EMR under Patient Instructions for exercises provided 03/06/2024 .    Assessment     Patient with mild improvement with pain with treatment. Good understanding of physical therapist recommendations.    Kathy Is progressing well towards her goals.   Pt prognosis is Excellent.     Pt will continue to benefit from skilled outpatient physical therapy to address the deficits listed in the problem list box on initial evaluation, provide pt/family education and to maximize pt's level of independence in the home and community environment.     Pt's spiritual, cultural and educational needs considered and pt agreeable to plan of care and goals.     Anticipated barriers to physical therapy: non    Short Term Goals: 6 weeks   Patient will be independent with pelvic floor relaxation to improve bowel and bladder evacuation. Goal not met -  progressing   Patient will be able to demonstrate improved pelvic floor relaxation and contraction on rehabilitative ultrasound. Goal not met - progressing   Patient will report improved water intake. Goal met 02/22/2024      Long Term Goals: 12 weeks   Patient will report urinating every 3-4 hours with strong urine stream. Goal not met - progressing - urinating every 1-2 hours  Patient will deny pelvic pain with vaginal penetration. Goal not met - progressing   Patient will deny pelvic pain at rest to allow for full tolerance for activities of choice. Goal not met - progressing   Patient will demonstrate adequate pelvic floor coordination with contraction and relaxation on sEMG vs rehabilitative ultrasound.  Goal not met - progressing     Plan     Progressive PF mobility training, monitor PVR. Monitor tolerance for vaginal suppositories and manual therapy. Seeing urogyn.   Keven Kirby, PT

## 2024-03-14 ENCOUNTER — CLINICAL SUPPORT (OUTPATIENT)
Dept: REHABILITATION | Facility: HOSPITAL | Age: 29
End: 2024-03-14
Payer: COMMERCIAL

## 2024-03-14 DIAGNOSIS — R10.2 CHRONIC PELVIC PAIN IN FEMALE: Primary | ICD-10-CM

## 2024-03-14 DIAGNOSIS — G89.29 CHRONIC PELVIC PAIN IN FEMALE: Primary | ICD-10-CM

## 2024-03-14 DIAGNOSIS — R27.8 COORDINATION ABNORMAL: ICD-10-CM

## 2024-03-14 DIAGNOSIS — M62.838 MUSCLE SPASM: ICD-10-CM

## 2024-03-14 PROCEDURE — 97530 THERAPEUTIC ACTIVITIES: CPT | Mod: PN | Performed by: PHYSICAL THERAPIST

## 2024-03-14 PROCEDURE — 97140 MANUAL THERAPY 1/> REGIONS: CPT | Mod: PN | Performed by: PHYSICAL THERAPIST

## 2024-03-14 NOTE — PROGRESS NOTES
Pelvic Health Physical Therapy   Treatment Note      Name: Kathy [facility]  Clinic Number: [id number]    Therapy Diagnosis:   Encounter Diagnoses   Name Primary?    Chronic pelvic pain in female Yes    Coordination abnormal     Muscle spasm      Physician: Padmini Drummond,*    Visit Date: 3/14/2024    Physician Orders: PT Eval and Treat PF PT  Medical Diagnosis from Referral: R10.2,G89.29 (ICD-10-CM) - Chronic pelvic pain in female  Evaluation Date: 1/19/2024  Authorization Period Expiration: 1/10/2025  Plan of Care Expiration: 4/12/2024  Progress Note Due: 3/21/2024  Visit # 7/12 Visits authorized: 6/20  FOTO: 1/3    Cancelled Visits: 0  No Show Visits: 0    Time In: 3:17 PM    Time Out: 4:07 PM   Total Billable Time: 50   minutes    Precautions: Standard    Subjective     Pt reports: She has had a lot of stress with family this week. She did try her wand - her pain from pain to soreness. Did not feel medicated with suppositories.     She was compliant with home exercise program.  Response to previous treatment: improved pain and pelvic floor awareness  Functional change: improved pelvic floor awareness    Pain in:  3/10  Pain out: 3/10 after manual therapy  Location: pelvic pain       Objective   Kathy participated in dynamic functional therapeutic activities to improve functional performance for 35 minutes, including:  Educated use of vaginal suppositories to assist with pain. Educated on continued PF down training with pain and stress. Provided lower extremity home exercise program.     Kathy received the following manual therapy techniques: to develop flexibility and extensibility for 15 minutes including: PF manual therapy with trigger point release to bilateral bulbocavernosus and bilateral levator ani, pudendal nerve mobility. Lower extremity ROM, psoas trigger point release.         Intervention Eval  01/25/2024 01/29/2024 02/08/2024 02/22/2024 03/06/2024 03/14/2024               Neuro Re-Ed  Rehabilitative ultrasound    493mL PVR 122mL   pelvic floor downtraining with assist of RUSI  and rehabilitative ultrasound imaging to help visualize pelvic floor muscle contraction and relaxation with kegels  PVR 163mL - went urinate in clinic still with 160mL - heard good urine stream  pelvic floor downtraining with assist of RUSI  and rehabilitative ultrasound imaging to help visualize pelvic floor muscle contraction and relaxation with kegels  High PVR PVR = 112mL - PF coordination 319mL - urinated in the lobby  pelvic floor downtraining with assist of RUSI  and rehabilitative ultrasound imaging to help visualize pelvic floor muscle contraction and relaxation with kegels  Bladder volume of 319mL - urinated in the lobby. Urinated in clinic with much better PVR. PF down training education.       pelvic floor downtraining with assist of RUSI  and rehabilitative ultrasound imaging to help visualize pelvic floor muscle contraction and relaxation with kegels  PF down training with manual therapy.     Manual Ther       trigger point/myofascial release of bilateral bulbocavernosus  trigger point/myofascial release of bilateral bulbocavernosus with contract/relax. lower extremity ROM, single knee to chest, hip internal rotation/external rotation, bilateral gastroc stretch PF manual therapy with trigger point release to bilateral bulbocavernosus and bilateral levator ani.  PF manual therapy with trigger point release to bilateral bulbocavernosus and bilateral levator ani, pudendal nerve mobility.    TherAct     Educated on double voiding before bed. Educated on urinating at least every 2 hours do decrease PF tension/burden.    Educated on double voiding before bed. Educated on leaning forward and giving pressure on the the bladder to better empty.    Educated posture while in the car, educated on stretches to assist with PF mobility and pain.    Educated double voiding most of the time to assist with bladder evacuation.  Encouraged use of vaginal dilator after use of suppository.  Educated use of vaginal suppositories to assist with pain. Educated on the benefits of Botox in the future. Patient will discuss with MD.  Educated use of vaginal suppositories to assist with pain. Educated on continued PF down training with pain and stress.          Home Exercises Provided and Patient Education Provided     Education provided:   - anatomy/physiology of pelvic floor, posture/body mechanices, diaphragmatic breathing, and double voiding techniques  Discussed progression of plan of care with patient; educated pt in activity modification; reviewed HEP with pt. Pt demonstrated and verbalized understanding of all instruction and was provided with a handout of HEP (see Patient Instructions).    Written Home Exercises Provided: yes.  Exercises were reviewed and Kathy was able to demonstrate them prior to the end of the session.  Kathy demonstrated good  understanding of the education provided.     See EMR under Patient Instructions for exercises provided 03/14/2024 .    Assessment     Patient with mild improvement with pain with treatment. Good understanding of physical therapist recommendations. Pain is improving overall.     Kathy Is progressing well towards her goals.   Pt prognosis is Excellent.     Pt will continue to benefit from skilled outpatient physical therapy to address the deficits listed in the problem list box on initial evaluation, provide pt/family education and to maximize pt's level of independence in the home and community environment.     Pt's spiritual, cultural and educational needs considered and pt agreeable to plan of care and goals.     Anticipated barriers to physical therapy: none    Short Term Goals: 6 weeks   Patient will be independent with pelvic floor relaxation to improve bowel and bladder evacuation. Goal not met - progressing   Patient will be able to demonstrate improved pelvic floor relaxation and contraction  on rehabilitative ultrasound. Goal not met - progressing   Patient will report improved water intake. Goal met 02/22/2024      Long Term Goals: 12 weeks   Patient will report urinating every 3-4 hours with strong urine stream. Goal not met - progressing - urinating every 1-2 hours  Patient will deny pelvic pain with vaginal penetration. Goal not met - progressing   Patient will deny pelvic pain at rest to allow for full tolerance for activities of choice. Goal not met - progressing   Patient will demonstrate adequate pelvic floor coordination with contraction and relaxation on sEMG vs rehabilitative ultrasound.  Goal not met - progressing     Plan     Progressive PF mobility training, monitor PVR. Monitor tolerance for vaginal suppositories and manual therapy. Seeing urogyn. Monitor wand for pudendal nerve.   Keven Kirby, PT

## 2024-03-18 ENCOUNTER — PATIENT MESSAGE (OUTPATIENT)
Dept: ADMINISTRATIVE | Facility: OTHER | Age: 29
End: 2024-03-18
Payer: COMMERCIAL

## 2024-03-19 DIAGNOSIS — K58.1 IRRITABLE BOWEL SYNDROME WITH CONSTIPATION: ICD-10-CM

## 2024-03-19 DIAGNOSIS — K59.09 CHRONIC CONSTIPATION: ICD-10-CM

## 2024-03-19 NOTE — TELEPHONE ENCOUNTER
No care due was identified.  [facility] Embedded Care Due Messages. Reference number: [id number].   3/19/2024 1:50:07 PM CDT

## 2024-03-19 NOTE — TELEPHONE ENCOUNTER
ED nurse-to-nurse bedside report    Chief Complaint   Patient presents with    Foot Pain    Leg Swelling      LOC: alert and orientated to name, place, date  Vital signs   Vitals:    11/02/21 1853 11/02/21 1936 11/02/21 1938 11/02/21 2020   BP: 134/78 134/77  (!) 145/73   Pulse: 82 81  90   Resp: 17 14  19   Temp:       TempSrc:       SpO2: 94% 95%  94%   Weight:       Height:   5' 9\" (1.753 m)       Pain:   Pain Interventions: See MAR  Oxygen: No  Telemetry: Yes  LDAs:   Peripheral IV 11/02/21 Right Antecubital (Active)   Site Assessment Clean;Dry; Intact 11/02/21 1938   Line Status Blood return noted; Flushed;Normal saline locked 11/02/21 1938   Dressing Status Clean;Dry; Intact 11/02/21 1938     Continuous Infusions:    heparin (PORCINE) Infusion 18 Units/kg/hr (11/02/21 1938)     Mobility: Requires assistance * [id number], RN  11/02/21 2133 Please see the attached refill request.

## 2024-03-20 ENCOUNTER — PATIENT MESSAGE (OUTPATIENT)
Dept: UROGYNECOLOGY | Facility: CLINIC | Age: 29
End: 2024-03-20
Payer: COMMERCIAL

## 2024-03-21 ENCOUNTER — CLINICAL SUPPORT (OUTPATIENT)
Dept: REHABILITATION | Facility: HOSPITAL | Age: 29
End: 2024-03-21
Payer: COMMERCIAL

## 2024-03-21 ENCOUNTER — OFFICE VISIT (OUTPATIENT)
Dept: UROGYNECOLOGY | Facility: CLINIC | Age: 29
End: 2024-03-21
Payer: COMMERCIAL

## 2024-03-21 VITALS
HEIGHT: 68 IN | WEIGHT: 247.38 LBS | BODY MASS INDEX: 37.49 KG/M2 | DIASTOLIC BLOOD PRESSURE: 78 MMHG | SYSTOLIC BLOOD PRESSURE: 128 MMHG | HEART RATE: 80 BPM

## 2024-03-21 DIAGNOSIS — G89.29 CHRONIC PELVIC PAIN IN FEMALE: Primary | ICD-10-CM

## 2024-03-21 DIAGNOSIS — F50.81 BINGE EATING DISORDER: ICD-10-CM

## 2024-03-21 DIAGNOSIS — R10.2 CHRONIC PELVIC PAIN IN FEMALE: Primary | ICD-10-CM

## 2024-03-21 DIAGNOSIS — M62.838 MUSCLE SPASM: ICD-10-CM

## 2024-03-21 DIAGNOSIS — R27.8 COORDINATION ABNORMAL: ICD-10-CM

## 2024-03-21 DIAGNOSIS — R35.0 URINARY FREQUENCY: ICD-10-CM

## 2024-03-21 DIAGNOSIS — R39.89 BLADDER PAIN: Primary | ICD-10-CM

## 2024-03-21 DIAGNOSIS — R39.15 URINARY URGENCY: ICD-10-CM

## 2024-03-21 LAB
BILIRUB SERPL-MCNC: NORMAL MG/DL
BLOOD URINE, POC: NORMAL
CLARITY, POC UA: CLEAR
COLOR, POC UA: NORMAL
GLUCOSE UR QL STRIP: NORMAL
KETONES UR QL STRIP: NORMAL
LEUKOCYTE ESTERASE URINE, POC: NORMAL
NITRITE, POC UA: NORMAL
PH, POC UA: 7
PROTEIN, POC: NORMAL
SPECIFIC GRAVITY, POC UA: NORMAL
UROBILINOGEN, POC UA: NORMAL

## 2024-03-21 PROCEDURE — 97530 THERAPEUTIC ACTIVITIES: CPT | Mod: PN | Performed by: PHYSICAL THERAPIST

## 2024-03-21 PROCEDURE — 99999 PR PBB SHADOW E&M-EST. PATIENT-LVL IV: CPT | Mod: PBBFAC,,, | Performed by: OBSTETRICS & GYNECOLOGY

## 2024-03-21 PROCEDURE — 87086 URINE CULTURE/COLONY COUNT: CPT | Performed by: OBSTETRICS & GYNECOLOGY

## 2024-03-21 PROCEDURE — 1159F MED LIST DOCD IN RCRD: CPT | Mod: CPTII,S$GLB,, | Performed by: OBSTETRICS & GYNECOLOGY

## 2024-03-21 PROCEDURE — 3078F DIAST BP <80 MM HG: CPT | Mod: CPTII,S$GLB,, | Performed by: OBSTETRICS & GYNECOLOGY

## 2024-03-21 PROCEDURE — 99214 OFFICE O/P EST MOD 30 MIN: CPT | Mod: S$GLB,,, | Performed by: OBSTETRICS & GYNECOLOGY

## 2024-03-21 PROCEDURE — 81002 URINALYSIS NONAUTO W/O SCOPE: CPT | Mod: S$GLB,,, | Performed by: OBSTETRICS & GYNECOLOGY

## 2024-03-21 PROCEDURE — 3074F SYST BP LT 130 MM HG: CPT | Mod: CPTII,S$GLB,, | Performed by: OBSTETRICS & GYNECOLOGY

## 2024-03-21 PROCEDURE — 3008F BODY MASS INDEX DOCD: CPT | Mod: CPTII,S$GLB,, | Performed by: OBSTETRICS & GYNECOLOGY

## 2024-03-21 PROCEDURE — 97140 MANUAL THERAPY 1/> REGIONS: CPT | Mod: PN | Performed by: PHYSICAL THERAPIST

## 2024-03-21 RX ORDER — SOLIFENACIN SUCCINATE 5 MG/1
5 TABLET, FILM COATED ORAL DAILY
Qty: 30 TABLET | Refills: 11 | Status: SHIPPED | OUTPATIENT
Start: 2024-03-21 | End: 2025-03-21

## 2024-03-21 RX ORDER — CONJUGATED ESTROGENS 0.62 MG/G
CREAM VAGINAL
Qty: 45 G | Refills: 12 | Status: SHIPPED | OUTPATIENT
Start: 2024-03-21

## 2024-03-21 NOTE — PROGRESS NOTES
Pelvic Health Physical Therapy   Treatment Note      [facility]  [facility] Number: [id number]    Therapy Diagnosis:   Encounter Diagnoses   Name Primary?    Chronic pelvic pain in female Yes    Coordination abnormal     Muscle spasm      Physician: Padmini Drummond,*    Visit Date: 3/21/2024    Physician Orders: PT Eval and Treat PF PT  Medical Diagnosis from Referral: R10.2,G89.29 (ICD-10-CM) - Chronic pelvic pain in female  Evaluation Date: 1/19/2024  Authorization Period Expiration: 1/10/2025  Plan of Care Expiration: 4/12/2024  Progress Note Due: 3/21/2024  Visit # 8/12 Visits authorized: 7/20  FOTO: 2/3    Cancelled Visits: 0  No Show Visits: 0    Time In: 3:22 PM    Time Out: 4:23 PM   Total Billable Time: 61 minutes    Precautions: Standard    Subjective     Pt reports: Saw Dr. Rajput this morning. She was catheterized and had a pelvic exam reports increased pain today with this visit. Feels as though she had a decent week before this visit. MD put her on Vesicare and vaginal estrogen.     She was compliant with home exercise program.  Response to previous treatment: improved pain and pelvic floor awareness  Functional change: improved pelvic floor awareness    Pain in:  7/10  Pain out: 5/10 after lower extremity ROM   Location: pelvic pain       Objective   Kathy participated in dynamic functional therapeutic activities to improve functional performance for 10 minutes, including:  Educated use of vaginal suppositories to assist with pain. Educated on continued PF down training with pain and stress. Provided lower extremity home exercise program. Can use wand if it assist with pain.     Kathy received the following manual therapy techniques: to develop flexibility and extensibility for 51 minutes including: Lower extremity ROM - single knee to chest, piriformis stretch, iliac depression, hip extension, psoas trigger point release.         Intervention Eval  01/25/2024 01/29/2024 02/08/2024   02/22/2024  03/06/2024  03/14/2024  3/21/2024                Neuro Re-Ed Rehabilitative ultrasound    493mL PVR 122mL   pelvic floor downtraining with assist of RUSI  and rehabilitative ultrasound imaging to help visualize pelvic floor muscle contraction and relaxation with kegels  PVR 163mL - went urinate in clinic still with 160mL - heard good urine stream  pelvic floor downtraining with assist of RUSI  and rehabilitative ultrasound imaging to help visualize pelvic floor muscle contraction and relaxation with kegels  High PVR PVR = 112mL - PF coordination 319mL - urinated in the lobby  pelvic floor downtraining with assist of RUSI  and rehabilitative ultrasound imaging to help visualize pelvic floor muscle contraction and relaxation with kegels  Bladder volume of 319mL - urinated in the lobby. Urinated in clinic with much better PVR. PF down training education.       pelvic floor downtraining with assist of RUSI  and rehabilitative ultrasound imaging to help visualize pelvic floor muscle contraction and relaxation with kegels  PF down training with manual therapy.      Manual Ther       trigger point/myofascial release of bilateral bulbocavernosus  trigger point/myofascial release of bilateral bulbocavernosus with contract/relax. lower extremity ROM, single knee to chest, hip internal rotation/external rotation, bilateral gastroc stretch PF manual therapy with trigger point release to bilateral bulbocavernosus and bilateral levator ani.  PF manual therapy with trigger point release to bilateral bulbocavernosus and bilateral levator ani, pudendal nerve mobility.  Lower extremity ROM - single knee to chest, piriformis stretch, iliac depression, hip extension, psoas trigger point release.      TherAct     Educated on double voiding before bed. Educated on urinating at least every 2 hours do decrease PF tension/burden.    Educated on double voiding before bed. Educated on leaning forward and giving pressure on the the  bladder to better empty.    Educated posture while in the car, educated on stretches to assist with PF mobility and pain.    Educated double voiding most of the time to assist with bladder evacuation. Encouraged use of vaginal dilator after use of suppository.  Educated use of vaginal suppositories to assist with pain. Educated on the benefits of Botox in the future. Patient will discuss with MD.  Educated use of vaginal suppositories to assist with pain. Educated on continued PF down training with pain and stress.    Educated use of vaginal suppositories to assist with pain. Educated on continued PF down training with pain and stress. Provided lower extremity home exercise program. Can use wand if it assist with pain.        Home Exercises Provided and Patient Education Provided     Education provided:   - anatomy/physiology of pelvic floor, posture/body mechanices, diaphragmatic breathing, and double voiding techniques  Discussed progression of plan of care with patient; educated pt in activity modification; reviewed HEP with pt. Pt demonstrated and verbalized understanding of all instruction and was provided with a handout of HEP (see Patient Instructions).    Written Home Exercises Provided: yes.  Exercises were reviewed and Kathy was able to demonstrate them prior to the end of the session.  Kathy demonstrated good  understanding of the education provided.     See EMR under Patient Instructions for exercises provided 03/21/2024 .    Assessment     Patient with mild improvement with pain with treatment. Good understanding of physical therapist recommendations. Regression today in regards to pain.    Kathy Is progressing well towards her goals.   Pt prognosis is Excellent.     Pt will continue to benefit from skilled outpatient physical therapy to address the deficits listed in the problem list box on initial evaluation, provide pt/family education and to maximize pt's level of independence in the home and  community environment.     Pt's spiritual, cultural and educational needs considered and pt agreeable to plan of care and goals.     Anticipated barriers to physical therapy: none    Short Term Goals: 6 weeks   Patient will be independent with pelvic floor relaxation to improve bowel and bladder evacuation. Goal not met - progressing   Patient will be able to demonstrate improved pelvic floor relaxation and contraction on rehabilitative ultrasound. Goal not met - progressing   Patient will report improved water intake. Goal met 02/22/2024      Long Term Goals: 12 weeks   Patient will report urinating every 3-4 hours with strong urine stream. Goal not met - progressing - urinating every 1-2 hours  Patient will deny pelvic pain with vaginal penetration. Goal not met - progressing   Patient will deny pelvic pain at rest to allow for full tolerance for activities of choice. Goal not met - progressing   Patient will demonstrate adequate pelvic floor coordination with contraction and relaxation on sEMG vs rehabilitative ultrasound.  Goal not met - progressing     Plan     Progressive PF mobility training, monitor PVR. Monitor tolerance for vaginal suppositories and manual therapy. Monitor wand for pudendal nerve.   Keven Kirby, PT

## 2024-03-21 NOTE — PATIENT INSTRUCTIONS
1.  Mixed urinary incontinence, urge > stress:   --Bladder diary for 3-7 days, write the number of times you go to the rest room and associated symptoms of urgency or leakage.   --Empty bladder every 3 hours.  Empty well: wait a minute, lean forward on toilet.    --Avoid dietary irritants (see sheet).  Keep diary x 3-5 days to determine your irritants.  --KEGELS: do 10 in AM and 10 in PM, holding each x 10 seconds.  When you feel urge to go, STOP, KEGEL, and when urge has passed, then go to bathroom.  Start pelvic floor PT.     --URGE: Vesicare 5 mg daily.  SE profile reviewed.    Takes 2-4 weeks to see if will have effect.  For dry mouth: get sour, sugar free lozenge or gum.    --STRESS:  Non surgical options: Pessary vs. Impressa vs Rivive - which represent urethral and bladder support devices; Surgical options including 1.  mid urethral sling; retropubic, transobturator vs single incision 2. Fascial slings 3. Shields procedure 4. Periurethral bulking     2. Bladder pain  Cysto

## 2024-03-21 NOTE — PROGRESS NOTES
Subjective:       Patient ID: Kathy Beltrán is a 29 y.o. female.    Chief Complaint:  Incomplete emptying , Bladder Pain, and Urinary Urgency        History of Present Illness  HPI 29 y.o.  female    has a past medical history of Hydradenitis.  Referred by Dr. Drummond for evalution of sensation of incomplete bladder emtying. She has a long history of chronic pelvic pain. She had a diagnostic laparoscopy that did not demonstrate endometriosis. She recall having enureses until she was 12 year she dilation and started on meds ( desmopressin ?) which helped her.       [facility]      Question 3/20/2024  6:27 PM CDT - Filed by Patient   General Urogynecology: Are you experiencing the following?    Dysuria (painful urination) Yes   Nocturia:  waking up at night to empty your bladder Yes   If you answered yes to the previous question, how many times does this happen per night? 2-4 times    Enuresis (urine loss during sleep) No   Dribbling urine after you urinate Yes   Hematuria (urine appears red) No   Type of stream Strong ?    Urinary frequency: How often a day are you going to the bathroom per day? Less than 10   Urinary Tract Infections: How many Urinary Tract Infections have you had in the past year? I have not had a UTI in the past year   If you have had a UTI in the past year, what treatments have you had so far? I have not had a UTI in the past year   Urinary Incontinence (General): Are you experiencing the following?    Past consultation for incontinence: Have you ever seen someone for the evaluation of incontinence? No   If you answered yes to the previous question, please select all the therapies you have tried. N/a- I answered no to the previous question   Please note the effectiveness of the therapies.    Need to wear protection to keep clothes dry No   If you answered yes to the previous question, please antionette the protection you use. N/a- I answered no to the previous question   If you  "wear protection, how much wetness is typically on each pad? N/a- I do not wear protection   If you wear protection, how often do you have to change per day, if applicable?    Stress Symptoms: Are you experiencing the following?    Leakage of urine with cough, laugh and/or sneeze Yes- not bothersome    If you answered yes to the previous question, what is the frequency in days, weeks and/or months? Never   Leakage of urine with sex No   Leakage of urine with bending/ lifting No   Leakage of urine with briskly walking or jogging No   If you lose urine for any other reason not previously mentioned, please note it below, if applicable.    Urge Symptoms: Are you experiencing the following?    Urgency ("got to go" feeling) Yes   Urge: How frequently do you feel an urge to urinate (feeling like you "gotta go" to the bathroom and can't wait) Several times a day   Do you experience a leakage of urine when you have a feeling of urgency? No   Leakage of urine when unaware No   Past use of anticholinergics (medications used to treat overactive bladder) Yes   If you answered yes to the previous question, please antionette the anticholinergics you have used: Ditropan   Have you ever used Mirbetriq (aka Mirabegron)? No   Prolapse Symptoms: Are you experiencing any of the following?    Falling out/ Bulging/ Heaviness in the vagina No   Vaginal/ Abdominal Pain/ Pressure Yes   Need to strain/ Push to void No   Need to wait on the toilet before you void Yes   Unusual position to urinate (using your hands to push back the vaginal bulge) No   Sensation of incomplete emptying Yes   Past use of pessary device No   If you answered yes to the previous question, please list the devices you have used below.    Bowel Symptoms: Are you experiencing any of the following?    Constipation Yes   Diarrhea Yes   Hematochezia (bloody stool) No   Incomplete evacuation of stool Yes   Involuntary loss of formed stool No   Fecal smearing/urgency Yes "   Involuntary loss of gas No   Vaginal Symptoms: Are you experiencing any of the following?    Abnormal vaginal bleeding Yes   Vaginal dryness Yes- spotting    Sexually active Yes   Dyspareunia (painful intercourse) Yes- initially and deep    Estrogen use No         GYN & OB History  No LMP recorded. (Menstrual status: Birth Control).   Date of Last Pap: 2023    OB History    Para Term  AB Living   3 1 1   2 1   SAB IAB Ectopic Multiple Live Births   2       1      # Outcome Date GA Lbr Tremayne/2nd Weight Sex Delivery Anes PTL Lv   3 Term     M CS-Unspec  N JOSE DE JESUS   2 SAB            1 2017             OB     x 0.  C/s x 1.    Largest: 7 # 12 oz       GYN  Menarche:  15  Menstrual cycle: IUD in place - not   Menopause: n/a  Hysterectomy:    Ovaries:  present   Tubal ligation: NO   Other abdominal surgeries: Keyonna      Past Medical History:   Diagnosis Date    Hydradenitis      Past Surgical History:   Procedure Laterality Date    ADENOIDECTOMY      BREAST BIOPSY       SECTION          CHOLECYSTECTOMY      CHOLESTEATOMA EXCISION      DIAGNOSTIC LAPAROSCOPY N/A 2023    Procedure: LAPAROSCOPY, DIAGNOSTIC;  Surgeon: Padmini Drummond MD;  Location: [facility];  Service: OB/GYN;  Laterality: N/A;    INTRAUTERINE DEVICE INSERTION N/A 2023    Procedure: INSERTION, INTRAUTERINE DEVICE (KYLEENA);  Surgeon: Padmini Drummond MD;  Location: [facility] OR;  Service: OB/GYN;  Laterality: N/A;    OPEN REDUCTION AND INTERNAL FIXATION (ORIF) OF INJURY OF ELBOW Left     ?    REMOVAL OF INTRAUTERINE DEVICE (IUD) N/A 2023    Procedure: REMOVAL, INTRAUTERINE DEVICE;  Surgeon: Padmini Drummond MD;  Location: [facility] OR;  Service: OB/GYN;  Laterality: N/A;    TONSILLECTOMY         Review of Systems  Review of Systems   Constitutional: Negative.  Negative for activity change, appetite change, chills, diaphoresis, fatigue, fever and unexpected weight change.   HENT:  Negative.     Eyes: Negative.    Respiratory: Negative.  Negative for apnea, cough and wheezing.    Cardiovascular: Negative.  Negative for chest pain and palpitations.   Gastrointestinal:  Positive for constipation and diarrhea. Negative for abdominal distention, abdominal pain, anal bleeding, blood in stool, nausea, rectal pain and vomiting.   Endocrine: Negative.    Genitourinary:  Positive for frequency and urgency. Negative for decreased urine volume, difficulty urinating, dyspareunia, dysuria, enuresis, flank pain, genital sores, hematuria, menstrual problem, pelvic pain, vaginal bleeding, vaginal discharge and vaginal pain.   Musculoskeletal:  Negative for back pain and gait problem.   Skin:  Negative for color change, pallor, rash and wound.   Allergic/Immunologic: Negative for immunocompromised state.   Neurological: Negative.  Negative for dizziness and speech difficulty.   Hematological:  Negative for adenopathy.   Psychiatric/Behavioral:  Negative for agitation, behavioral problems, confusion and sleep disturbance.            Objective:     Physical Exam   Constitutional: She is oriented to person, place, and time. She appears well-developed.   HENT:   Head: Normocephalic and atraumatic.   Eyes: Conjunctivae and EOM are normal.   Cardiovascular: Normal rate, regular rhythm, S1 normal, S2 normal, normal heart sounds and intact distal pulses.   Pulmonary/Chest: Effort normal and breath sounds normal. She exhibits no tenderness.   Abdominal: Soft. Bowel sounds are normal. She exhibits no distension and no mass. There is no splenomegaly or hepatomegaly. There is no abdominal tenderness. There is no rigidity, no rebound and no guarding. No hernia.   Genitourinary: Pelvic exam was performed with patient supine. Rectum normal, vagina normal, uterus normal, cervix normal, skenes normal and bartholins normal. Right labia normal and left labia normal. Urethra exhibits hypermobility. Urethra exhibits no urethral  caruncle, no urethral diverticulum and no urethral mass. Right bartholin is not enlarged and not tender. Left bartholin is not enlarged and not tender. Rectal exam shows resting tone normal and active tone normal. Rectal exam shows no external hemorrhoid, no fissure, no tenderness, anal tone normal and no dovetailing. Guaiac negative stool. There is atrophy in the vagina. No foreign body, tenderness, bleeding, unspecified prolapse of vaginal walls, fistula, mesh exposure or lavator tenderness in the vagina. Right adnexum displays no mass and no tenderness. Left adnexum displays no mass and no tenderness. Cervix exhibits no motion tenderness and no discharge. Uterus is not tender and not experiencing uterine prolapse.   PVR: 50 ML  Empty cough stress test: Negative.  Kegel: 3/5    POP-Q  Aa: -2 Ba: -2 C: -5   GH: 3 PB: 2 TVL: 8   Ap: -2 Bp: -2 D: -6                     Musculoskeletal:         General: Normal range of motion.      Cervical back: Normal range of motion and neck supple.   Neurological: She is alert and oriented to person, place, and time. She has normal strength, normal reflexes and intact cranial nerves (2-12). Cranial nerves II through XII intact. No cranial nerve deficit.   Skin: Skin is warm and dry.   Psychiatric: She has a normal mood and affect. Her speech is normal and behavior is normal. Judgment normal.                Assessment:        1. Bladder pain    2. Urinary urgency    3. Urinary frequency                Plan:    1.  Urinary urgency, frequency (OAB)  --Bladder diary for 3-7 days, write the number of times you go to the rest room and associated symptoms of urgency or leakage.   --Empty bladder every 3 hours.  Empty well: wait a minute, lean forward on toilet.    --Avoid dietary irritants (see sheet).  Keep diary x 3-5 days to determine your irritants.  --KEGELS: do 10 in AM and 10 in PM, holding each x 10 seconds.  When you feel urge to go, STOP, KEGEL, and when urge has passed, then  go to bathroom.  Start pelvic floor PT.     --URGE: Vesicare 5 mg daily.  SE profile reviewed.    Takes 2-4 weeks to see if will have effect.  For dry mouth: get sour, sugar free lozenge or gum.    --STRESS:  Non surgical options: Pessary vs. Impressa vs Rivive - which represent urethral and bladder support devices; Surgical options including 1.  mid urethral sling; retropubic, transobturator vs single incision 2. Fascial slings 3. Shields procedure 4. Periurethral bulking     2. Bladder pain  Cysto     Approximately 35 minutes of total time spent in consult, 75 % in discussion. This includes face to face time and non-face to face time preparing to see the patient, obtaining and/or reviewing separately obtained history, documenting clinical information in the electronic or other health record, independently interpreting results (not separately reported) and communicating results to the patient/family/caregiver, or care coordination (not separately reported).      Thank you for requesting consultation of your patient.  I look forward to participating in her care.    Raisa Dean DO  Female Pelvic Medicine and Reconstructive Surgery  [facility] [place], [place]

## 2024-03-21 NOTE — TELEPHONE ENCOUNTER
No care due was identified.  Health [facility] Embedded Care Due Messages. Reference number: [id number].   3/21/2024 10:15:58 AM CDT

## 2024-03-22 RX ORDER — LISDEXAMFETAMINE DIMESYLATE 30 MG/1
30 CAPSULE ORAL EVERY MORNING
Qty: 30 CAPSULE | Refills: 0 | Status: SHIPPED | OUTPATIENT
Start: 2024-03-22 | End: 2024-04-29 | Stop reason: SDUPTHER

## 2024-03-23 LAB — BACTERIA UR CULT: NO GROWTH

## 2024-03-28 ENCOUNTER — CLINICAL SUPPORT (OUTPATIENT)
Dept: REHABILITATION | Facility: HOSPITAL | Age: 29
End: 2024-03-28
Payer: COMMERCIAL

## 2024-03-28 DIAGNOSIS — M62.838 MUSCLE SPASM: ICD-10-CM

## 2024-03-28 DIAGNOSIS — R27.8 COORDINATION ABNORMAL: ICD-10-CM

## 2024-03-28 DIAGNOSIS — G89.29 CHRONIC PELVIC PAIN IN FEMALE: Primary | ICD-10-CM

## 2024-03-28 DIAGNOSIS — R10.2 CHRONIC PELVIC PAIN IN FEMALE: Primary | ICD-10-CM

## 2024-03-28 PROCEDURE — 97530 THERAPEUTIC ACTIVITIES: CPT | Mod: PN | Performed by: PHYSICAL THERAPIST

## 2024-03-28 PROCEDURE — 97140 MANUAL THERAPY 1/> REGIONS: CPT | Mod: PN | Performed by: PHYSICAL THERAPIST

## 2024-03-28 NOTE — PROGRESS NOTES
Pelvic Health Physical Therapy   Treatment Note and Progress Note     Name: Kathy Palomo [facility]  Clinic Number: [id number]    Therapy Diagnosis:   Encounter Diagnoses   Name Primary?    Chronic pelvic pain in female Yes    Coordination abnormal     Muscle spasm        Physician: Padmini Drummond,*    Visit Date: 3/28/2024    Physician Orders: PT Eval and Treat PF PT  Medical Diagnosis from Referral: R10.2,G89.29 (ICD-10-CM) - Chronic pelvic pain in female  Evaluation Date: 1/19/2024  Authorization Period Expiration: 1/10/2025  Plan of Care Expiration: 4/12/2024  Progress Note Due: 4/25/2024  Visit # 9/12 Visits authorized: 8/20  FOTO: 2/3    Cancelled Visits: 0  No Show Visits: 0    Time In: 3:12 PM   Time Out: 4:00  Total Billable Time: 48 minutes    Precautions: Standard    Subjective     Pt reports: Has been taking her 15 min breaks while at work. Feeling better with vaginal estrogen. Her legs are also not hurting as much. Started her Vesicare about a week ago. Still urinating a lot. Drinking a lot of water. Feels like she is retaining urine. Having some bladder pain. She did start running again. Has lost 70lbs in the last year. Used her pelvic wand the day after her last visit with me. Used suppositories Friday and Saturday.     She was compliant with home exercise program.  Response to previous treatment: improved pain and pelvic floor awareness  Functional change: improved pelvic floor awareness    Pain in: 1/10  Pain out: 1 /10 after lower extremity ROM   Location: pelvic pain       Objective   Kathy participated in dynamic functional therapeutic activities to improve functional performance for 23 minutes, including:  Educated on double voiding PRN when she does not empty well. Use of wand with limited movement for PF mobility educated on IC diet and being aware of bladder irritants.     Kathy received the following manual therapy techniques: to develop flexibility and extensibility for 25 minutes  including: trigger point release to the bilateral levator ani with cues for PF relaxation.       Intervention 02/22/2024  03/06/2024  03/14/2024  3/21/2024  03/28/2024             Neuro Re-Ed Rehabilitative ultrasound  319mL - urinated in the lobby  pelvic floor downtraining with assist of RUSI  and rehabilitative ultrasound imaging to help visualize pelvic floor muscle contraction and relaxation with kegels  Bladder volume of 319mL - urinated in the lobby. Urinated in clinic with much better PVR. PF down training education.       pelvic floor downtraining with assist of RUSI  and rehabilitative ultrasound imaging to help visualize pelvic floor muscle contraction and relaxation with kegels  PF down training with manual therapy.       Manual Ther  lower extremity ROM, single knee to chest, hip internal rotation/external rotation, bilateral gastroc stretch PF manual therapy with trigger point release to bilateral bulbocavernosus and bilateral levator ani.  PF manual therapy with trigger point release to bilateral bulbocavernosus and bilateral levator ani, pudendal nerve mobility.  Lower extremity ROM - single knee to chest, piriformis stretch, iliac depression, hip extension, psoas trigger point release.      Educated on double voiding PRN when she does not empty well. Use of wand with limited movement for PF mobility educated on IC diet and being aware of bladder irritants.   TherAct  Educated double voiding most of the time to assist with bladder evacuation. Encouraged use of vaginal dilator after use of suppository.  Educated use of vaginal suppositories to assist with pain. Educated on the benefits of Botox in the future. Patient will discuss with MD.  Educated use of vaginal suppositories to assist with pain. Educated on continued PF down training with pain and stress.    Educated use of vaginal suppositories to assist with pain. Educated on continued PF down training with pain and stress. Provided lower  extremity home exercise program. Can use wand if it assist with pain.  Educated on double voiding PRN when she does not empty well. Use of wand with limited movement for PF mobility educated on IC diet and being aware of bladder irritants.          Home Exercises Provided and Patient Education Provided     Education provided:   - anatomy/physiology of pelvic floor, posture/body mechanices, diaphragmatic breathing, and double voiding techniques  Discussed progression of plan of care with patient; educated pt in activity modification; reviewed HEP with pt. Pt demonstrated and verbalized understanding of all instruction and was provided with a handout of HEP (see Patient Instructions).    Written Home Exercises Provided: yes.  Exercises were reviewed and Kathy was able to demonstrate them prior to the end of the session.  Kathy demonstrated good  understanding of the education provided.     See EMR under Patient Instructions for exercises provided 03/28/2024 .    Assessment     Patient with mild improvement with pain with treatment. Good understanding of physical therapist recommendations. Regression today in regards to pain.    Kathy Is progressing well towards her goals.   Pt prognosis is Excellent.     Pt will continue to benefit from skilled outpatient physical therapy to address the deficits listed in the problem list box on initial evaluation, provide pt/family education and to maximize pt's level of independence in the home and community environment.     Pt's spiritual, cultural and educational needs considered and pt agreeable to plan of care and goals.     Anticipated barriers to physical therapy: none    Short Term Goals: 6 weeks   Patient will be independent with pelvic floor relaxation to improve bowel and bladder evacuation. Goal not met - progressing   Patient will be able to demonstrate improved pelvic floor relaxation and contraction on rehabilitative ultrasound. Goal not met - progressing   Patient  will report improved water intake. Goal met 02/22/2024      Long Term Goals: 12 weeks   Patient will report urinating every 3-4 hours with strong urine stream. Goal not met - progressing - urinating every 1-2 hours  Patient will deny pelvic pain with vaginal penetration. Goal not met - progressing   Patient will deny pelvic pain at rest to allow for full tolerance for activities of choice. Goal not met - progressing   Patient will demonstrate adequate pelvic floor coordination with contraction and relaxation on sEMG vs rehabilitative ultrasound.  Goal not met - progressing     Plan     Progressive PF mobility training, monitor PVR. Monitor tolerance for vaginal suppositories and manual therapy. Monitor wand for pudendal nerve.   Keven Kirby, PT

## 2024-04-04 ENCOUNTER — CLINICAL SUPPORT (OUTPATIENT)
Dept: REHABILITATION | Facility: HOSPITAL | Age: 29
End: 2024-04-04
Payer: COMMERCIAL

## 2024-04-04 ENCOUNTER — PATIENT MESSAGE (OUTPATIENT)
Dept: REHABILITATION | Facility: HOSPITAL | Age: 29
End: 2024-04-04

## 2024-04-04 DIAGNOSIS — G89.29 CHRONIC PELVIC PAIN IN FEMALE: Primary | ICD-10-CM

## 2024-04-04 DIAGNOSIS — M62.838 MUSCLE SPASM: ICD-10-CM

## 2024-04-04 DIAGNOSIS — R10.2 CHRONIC PELVIC PAIN IN FEMALE: Primary | ICD-10-CM

## 2024-04-04 DIAGNOSIS — R27.8 COORDINATION ABNORMAL: ICD-10-CM

## 2024-04-04 PROCEDURE — 97530 THERAPEUTIC ACTIVITIES: CPT | Mod: PN | Performed by: PHYSICAL THERAPIST

## 2024-04-04 PROCEDURE — 97140 MANUAL THERAPY 1/> REGIONS: CPT | Mod: PN | Performed by: PHYSICAL THERAPIST

## 2024-04-04 NOTE — PATIENT INSTRUCTIONS
Goal: to maintain pelvic floor mobility   Use dilators 3xs a week - 5-8 min, use a lubricant   At the very least use prior to intercourse.

## 2024-04-04 NOTE — PROGRESS NOTES
"  Pelvic Health Physical Therapy   Treatment Note      [facility]  [id number]    Therapy Diagnosis:   Encounter Diagnoses   Name Primary?    Chronic pelvic pain in female Yes    Coordination abnormal     Muscle spasm        Physician: Padmini Drummond,*    Visit Date: 4/4/2024    Physician Orders: PT Eval and Treat PF PT  Medical Diagnosis from Referral: R10.2,G89.29 (ICD-10-CM) - Chronic pelvic pain in female  Evaluation Date: 1/19/2024  Authorization Period Expiration: 1/10/2025  Plan of Care Expiration: 4/12/2024  Progress Note Due: 4/25/2024  Visit # 10/12 Visits authorized: 9/20  FOTO: 2/3    Cancelled Visits: 0  No Show Visits: 0    Time In: 3:00 PM   Time Out: 3:50 PM   Total Billable Time: 50 minutes    Precautions: Standard    Subjective     Pt reports: Been on meds for 2 weeks now. Feels the same. Urinated before leaving to come here and urinated when she got here. Has been making it longer at night. Still using vaginal estrogen.     She was compliant with home exercise program.  Response to previous treatment: improved pain and pelvic floor awareness  Functional change: improved pelvic floor awareness    Pain in: 3/10 "normal"  Pain out: 4/10 after pelvic floor manual therapy  Location: pelvic pain       Objective   Kathy participated in dynamic functional therapeutic activities to improve functional performance for 20  minutes, including:  Educated on use of vaginal dilators vs pelvic wand on a regular basis to assist with maintaining pelvic floor mobility. Educated on use of TENs unit to assist with pelvic pain as well.     Kathy received the following manual therapy techniques: to develop flexibility and extensibility for 30 minutes including: trigger point release to the bilateral levator ani with cues for PF relaxation. Contract relax to the obturator internus.       Intervention 02/22/2024  03/06/2024  03/14/2024  3/21/2024  03/28/2024  04/04/2024              Neuro " Re-Ed Rehabilitative ultrasound  319mL - urinated in the lobby  pelvic floor downtraining with assist of RUSI  and rehabilitative ultrasound imaging to help visualize pelvic floor muscle contraction and relaxation with kegels  Bladder volume of 319mL - urinated in the lobby. Urinated in clinic with much better PVR. PF down training education.       pelvic floor downtraining with assist of RUSI  and rehabilitative ultrasound imaging to help visualize pelvic floor muscle contraction and relaxation with kegels  PF down training with manual therapy.        Manual Ther  lower extremity ROM, single knee to chest, hip internal rotation/external rotation, bilateral gastroc stretch PF manual therapy with trigger point release to bilateral bulbocavernosus and bilateral levator ani.  PF manual therapy with trigger point release to bilateral bulbocavernosus and bilateral levator ani, pudendal nerve mobility.  Lower extremity ROM - single knee to chest, piriformis stretch, iliac depression, hip extension, psoas trigger point release.      Educated on double voiding PRN when she does not empty well. Use of wand with limited movement for PF mobility educated on IC diet and being aware of bladder irritants. trigger point release to the bilateral levator ani with cues for PF relaxation. Contract relax to the obturator internus.    TherAct  Educated double voiding most of the time to assist with bladder evacuation. Encouraged use of vaginal dilator after use of suppository.  Educated use of vaginal suppositories to assist with pain. Educated on the benefits of Botox in the future. Patient will discuss with MD.  Educated use of vaginal suppositories to assist with pain. Educated on continued PF down training with pain and stress.    Educated use of vaginal suppositories to assist with pain. Educated on continued PF down training with pain and stress. Provided lower extremity home exercise program. Can use wand if it assist with pain.   Educated on double voiding PRN when she does not empty well. Use of wand with limited movement for PF mobility educated on IC diet and being aware of bladder irritants.    Educated on use of vaginal dilators vs pelvic wand on a regular basis to assist with maintaining pelvic floor mobility. Educated on use of TENs unit to assist with pelvic pain as well.        Home Exercises Provided and Patient Education Provided     Education provided:   - anatomy/physiology of pelvic floor, posture/body mechanices, diaphragmatic breathing, and double voiding techniques  Discussed progression of plan of care with patient; educated pt in activity modification; reviewed HEP with pt. Pt demonstrated and verbalized understanding of all instruction and was provided with a handout of HEP (see Patient Instructions).    Written Home Exercises Provided: yes.  Exercises were reviewed and Kathy was able to demonstrate them prior to the end of the session.  Kathy demonstrated good  understanding of the education provided.     See EMR under Patient Instructions for exercises provided 04/04/2024 .    Assessment     Patient with mild improvement with pain with treatment. Good understanding of physical therapist recommendations.     Kathy Is progressing well towards her goals.   Pt prognosis is Excellent.     Pt will continue to benefit from skilled outpatient physical therapy to address the deficits listed in the problem list box on initial evaluation, provide pt/family education and to maximize pt's level of independence in the home and community environment.     Pt's spiritual, cultural and educational needs considered and pt agreeable to plan of care and goals.     Anticipated barriers to physical therapy: none    Short Term Goals: 6 weeks   Patient will be independent with pelvic floor relaxation to improve bowel and bladder evacuation. Goal not met - progressing   Patient will be able to demonstrate improved pelvic floor relaxation and  contraction on rehabilitative ultrasound. Goal not met - progressing   Patient will report improved water intake. Goal met 02/22/2024      Long Term Goals: 12 weeks   Patient will report urinating every 3-4 hours with strong urine stream. Goal not met - progressing - urinating every 1-2 hours  Patient will deny pelvic pain with vaginal penetration. Goal not met - progressing   Patient will deny pelvic pain at rest to allow for full tolerance for activities of choice. Goal not met - progressing   Patient will demonstrate adequate pelvic floor coordination with contraction and relaxation on sEMG vs rehabilitative ultrasound.  Goal not met - progressing     Plan     Progressive PF mobility training, monitor PVR. Monitor tolerance for vaginal suppositories and manual therapy. Monitor wand for pudendal nerve.   Keven Kirby, PT

## 2024-04-11 ENCOUNTER — CLINICAL SUPPORT (OUTPATIENT)
Dept: REHABILITATION | Facility: HOSPITAL | Age: 29
End: 2024-04-11
Payer: COMMERCIAL

## 2024-04-11 DIAGNOSIS — G89.29 CHRONIC PELVIC PAIN IN FEMALE: Primary | ICD-10-CM

## 2024-04-11 DIAGNOSIS — M62.838 MUSCLE SPASM: ICD-10-CM

## 2024-04-11 DIAGNOSIS — R10.2 CHRONIC PELVIC PAIN IN FEMALE: Primary | ICD-10-CM

## 2024-04-11 DIAGNOSIS — R27.8 COORDINATION ABNORMAL: ICD-10-CM

## 2024-04-11 PROCEDURE — 97140 MANUAL THERAPY 1/> REGIONS: CPT | Mod: PN | Performed by: PHYSICAL THERAPIST

## 2024-04-11 PROCEDURE — 97530 THERAPEUTIC ACTIVITIES: CPT | Mod: PN | Performed by: PHYSICAL THERAPIST

## 2024-04-11 NOTE — PROGRESS NOTES
"  Pelvic Health Physical Therapy   Treatment Note      [facility]  [facility] [id number]    Therapy Diagnosis:   Encounter Diagnoses   Name Primary?    Chronic pelvic pain in female Yes    Coordination abnormal     Muscle spasm        Physician: Padmini Drummond,*    Visit Date: 4/11/2024    Physician Orders: PT Eval and Treat PF PT  Medical Diagnosis from Referral: R10.2,G89.29 (ICD-10-CM) - Chronic pelvic pain in female  Evaluation Date: 1/19/2024  Authorization Period Expiration: 1/10/2025  Plan of Care Expiration: 4/12/2024  Progress Note Due: 4/25/2024  Visit # 11/12 Visits authorized: 10/20  FOTO: 2/3    Cancelled Visits: 0  No Show Visits: 0    Time In: 3:35 PM   Time Out: 4:12 PM    Total Billable Time: 37 minutes    Precautions: Standard    Subjective     Pt reports: Not using suppositories. Has been using her pelvic wand. Feels like she hurts herself with the wand. Nights have been going good. Nocturia is better.     She was compliant with home exercise program.  Response to previous treatment: improved pain and pelvic floor awareness  Functional change: improved pelvic floor awareness    Pain in: 3/10 "normal" "it is not bad" hip pain is 2/10  Pain out: 2/10 after pelvic floor manual therapy  Location: pelvic pain       Objective   Kathy participated in dynamic functional therapeutic activities to improve functional performance for 10  minutes, including:  Educated on increased use of the gluts with ambulation, regular mobility of the hip flexors.     Kathy received the following manual therapy techniques: to develop flexibility and extensibility for 27 minutes including: trigger point release to the bilateral psoas, sidelying iliac depression, passive hip extension.      Intervention 02/22/2024  03/06/2024  03/14/2024  3/21/2024  03/28/2024  04/04/2024  04/11/2024    Neuro Re-Ed Rehabilitative ultrasound  319mL - urinated in the lobby  pelvic floor downtraining with assist of LEONCIO  " and rehabilitative ultrasound imaging to help visualize pelvic floor muscle contraction and relaxation with kegels  Bladder volume of 319mL - urinated in the lobby. Urinated in clinic with much better PVR. PF down training education.       pelvic floor downtraining with assist of RUSI  and rehabilitative ultrasound imaging to help visualize pelvic floor muscle contraction and relaxation with kegels  PF down training with manual therapy.         Manual Ther  lower extremity ROM, single knee to chest, hip internal rotation/external rotation, bilateral gastroc stretch PF manual therapy with trigger point release to bilateral bulbocavernosus and bilateral levator ani.  PF manual therapy with trigger point release to bilateral bulbocavernosus and bilateral levator ani, pudendal nerve mobility.  Lower extremity ROM - single knee to chest, piriformis stretch, iliac depression, hip extension, psoas trigger point release.      Educated on double voiding PRN when she does not empty well. Use of wand with limited movement for PF mobility educated on IC diet and being aware of bladder irritants. trigger point release to the bilateral levator ani with cues for PF relaxation. Contract relax to the obturator internus.  trigger point release to the bilateral psoas, sidelying iliac depression, passive hip extension.   TherAct  Educated double voiding most of the time to assist with bladder evacuation. Encouraged use of vaginal dilator after use of suppository.  Educated use of vaginal suppositories to assist with pain. Educated on the benefits of Botox in the future. Patient will discuss with MD.  Educated use of vaginal suppositories to assist with pain. Educated on continued PF down training with pain and stress.    Educated use of vaginal suppositories to assist with pain. Educated on continued PF down training with pain and stress. Provided lower extremity home exercise program. Can use wand if it assist with pain.  Educated on  double voiding PRN when she does not empty well. Use of wand with limited movement for PF mobility educated on IC diet and being aware of bladder irritants.    Educated on use of vaginal dilators vs pelvic wand on a regular basis to assist with maintaining pelvic floor mobility. Educated on use of TENs unit to assist with pelvic pain as well.  Educated on increased use of the gluts with ambulation, regular mobility of the hip flexors.          Home Exercises Provided and Patient Education Provided     Education provided:   - anatomy/physiology of pelvic floor, posture/body mechanices, diaphragmatic breathing, and double voiding techniques  Discussed progression of plan of care with patient; educated pt in activity modification; reviewed HEP with pt. Pt demonstrated and verbalized understanding of all instruction and was provided with a handout of HEP (see Patient Instructions).    Written Home Exercises Provided: yes.  Exercises were reviewed and Kathy was able to demonstrate them prior to the end of the session.  Kathy demonstrated good  understanding of the education provided.     See EMR under Patient Instructions for exercises provided 04/11/2024 .    Assessment     Patient with mild improvement with pain with treatment. Good understanding of physical therapist recommendations.     Kathy Is progressing well towards her goals.   Pt prognosis is Excellent.     Pt will continue to benefit from skilled outpatient physical therapy to address the deficits listed in the problem list box on initial evaluation, provide pt/family education and to maximize pt's level of independence in the home and community environment.     Pt's spiritual, cultural and educational needs considered and pt agreeable to plan of care and goals.     Anticipated barriers to physical therapy: none    Short Term Goals: 6 weeks   Patient will be independent with pelvic floor relaxation to improve bowel and bladder evacuation. Goal not met -  progressing   Patient will be able to demonstrate improved pelvic floor relaxation and contraction on rehabilitative ultrasound. Goal not met - progressing   Patient will report improved water intake. Goal met 02/22/2024      Long Term Goals: 12 weeks   Patient will report urinating every 3-4 hours with strong urine stream. Goal not met - progressing - urinating every 1-2 hours  Patient will deny pelvic pain with vaginal penetration. Goal not met - progressing   Patient will deny pelvic pain at rest to allow for full tolerance for activities of choice. Goal not met - progressing   Patient will demonstrate adequate pelvic floor coordination with contraction and relaxation on sEMG vs rehabilitative ultrasound.  Goal not met - progressing     Plan     Progressive PF mobility training, monitor PVR. Monitor tolerance for vaginal suppositories and manual therapy. Monitor wand for pudendal nerve.   Keven Kirby, PT

## 2024-04-29 DIAGNOSIS — F50.81 BINGE EATING DISORDER: ICD-10-CM

## 2024-04-29 RX ORDER — LISDEXAMFETAMINE DIMESYLATE 30 MG/1
30 CAPSULE ORAL EVERY MORNING
Qty: 30 CAPSULE | Refills: 0 | Status: SHIPPED | OUTPATIENT
Start: 2024-04-29 | End: 2024-05-29 | Stop reason: SDUPTHER

## 2024-04-29 NOTE — TELEPHONE ENCOUNTER
No care due was identified.  [facility] Embedded Care Due Messages. Reference number: [id number].   4/29/2024 6:23:31 AM CDT

## 2024-05-13 ENCOUNTER — CLINICAL SUPPORT (OUTPATIENT)
Dept: REHABILITATION | Facility: HOSPITAL | Age: 29
End: 2024-05-13
Payer: COMMERCIAL

## 2024-05-13 DIAGNOSIS — R27.8 COORDINATION ABNORMAL: ICD-10-CM

## 2024-05-13 DIAGNOSIS — M62.838 MUSCLE SPASM: ICD-10-CM

## 2024-05-13 DIAGNOSIS — R10.2 CHRONIC PELVIC PAIN IN FEMALE: Primary | ICD-10-CM

## 2024-05-13 DIAGNOSIS — G89.29 CHRONIC PELVIC PAIN IN FEMALE: Primary | ICD-10-CM

## 2024-05-13 PROCEDURE — 97530 THERAPEUTIC ACTIVITIES: CPT | Mod: PN | Performed by: PHYSICAL THERAPIST

## 2024-05-13 PROCEDURE — 97112 NEUROMUSCULAR REEDUCATION: CPT | Mod: PN | Performed by: PHYSICAL THERAPIST

## 2024-05-13 NOTE — PATIENT INSTRUCTIONS
Gastro colic reflex: morning routine  Wake up - drink some water   Go urinate if you need  Return to bed or a chair - comfortable   Abdominal massage using 3 flat fingers - ILU - 2-3 min as often as you need  Do not suppress the urge for a bowel movement - ever  15 min after you are awake - NOTICE the urge for a bowel movement  Attempt to have BM - Put your feet on a stool  Belly breathing and pelvic floor relaxation   Relax the pelvic floor do not strain - imagine the rectum like a flower blooming           [email or web address]

## 2024-05-13 NOTE — PROGRESS NOTES
"  Pelvic Health Physical Therapy   Treatment Note and Updated POC     Name: Kathy Palomo [facility]  Clinic Number: [id number]    Therapy Diagnosis:   Encounter Diagnoses   Name Primary?    Chronic pelvic pain in female Yes    Coordination abnormal     Muscle spasm        Physician: Padmini Drummond,*    Visit Date: 5/13/2024    Physician Orders: PT Eval and Treat PF PT  Medical Diagnosis from Referral: R10.2,G89.29 (ICD-10-CM) - Chronic pelvic pain in female  Evaluation Date: 1/19/2024  Authorization Period Expiration: 1/10/2025  Plan of Care Expiration: 8/5/2024  Progress Note Due: 6/10/2024  Visit # 12/12 Visits authorized: 11/20  FOTO: 3/3    Cancelled Visits: 0  No Show Visits: 0    Time In: 3:20 PM   Time Out: 4:12 PM    Total Billable Time: 52 minutes    Precautions: Standard    Subjective     Pt reports: Using the dilators more then the wand. Having her cystoscope next week. Feels like she has IC. Triggered with Tomato sauce. Nocturia is a little worse - making it till 4-5am. Not using suppositories. Has been compliant with hip flexor mobility. She has been running about 3 miles every other day. Still on vaginal estrogen. Has not noticed much difference with Vesicare.     Still leaking after voiding. Leaks when she stands.     Doing well with water. Feels like things cut off with bowel evacuation.     She was compliant with home exercise program.  Response to previous treatment: improved pain and pelvic floor awareness  Functional change: improved pelvic floor awareness    Pain in: 2/10 "normal" "it is not bad"  Pain out: 2/10 after pelvic floor manual therapy  Location: pelvic pain       Objective     Kathy participated in dynamic functional therapeutic activities to improve functional performance for 27  minutes, including:  Educated on gastro colic reflex and improved bowel habits.     Kathy participated in neuromuscular re-education activities to develop Coordination, Down training, and Proprioception for " 25 minutes including: RUSI for breathing, drops, contractions of the PFM to help pt visualize PFM motion and function. and rehabilitative ultrasound imaging to help visualize pelvic floor muscle contraction and relaxation with kegels  Perineal view and lower abdominal view. PVR WNL. Educated on PF down training and mobility to assist with bowel habits.        Intervention 03/28/2024 04/04/2024 04/11/2024 05/13/2024    Neuro Re-Ed Rehabilitative ultrasound     RUSI for breathing, drops, contractions of the PFM to help pt visualize PFM motion and function. and rehabilitative ultrasound imaging to help visualize pelvic floor muscle contraction and relaxation with kegels  Perineal view and lower abdominal view. PVR WNL. Educated on PF down training and mobility to assist with bowel habits.     Manual Ther    Educated on double voiding PRN when she does not empty well. Use of wand with limited movement for PF mobility educated on IC diet and being aware of bladder irritants. trigger point release to the bilateral levator ani with cues for PF relaxation. Contract relax to the obturator internus.  trigger point release to the bilateral psoas, sidelying iliac depression, passive hip extension.    TherAct  Educated on double voiding PRN when she does not empty well. Use of wand with limited movement for PF mobility educated on IC diet and being aware of bladder irritants.    Educated on use of vaginal dilators vs pelvic wand on a regular basis to assist with maintaining pelvic floor mobility. Educated on use of TENs unit to assist with pelvic pain as well.  Educated on increased use of the gluts with ambulation, regular mobility of the hip flexors.    Educated on gastro colic reflex and improved bowel habits.          Home Exercises Provided and Patient Education Provided     Education provided:   - anatomy/physiology of pelvic floor, posture/body mechanices, diaphragmatic breathing, and double voiding  techniques  Discussed progression of plan of care with patient; educated pt in activity modification; reviewed HEP with pt. Pt demonstrated and verbalized understanding of all instruction and was provided with a handout of HEP (see Patient Instructions).    Written Home Exercises Provided: yes.  Exercises were reviewed and Kathy was able to demonstrate them prior to the end of the session.  Kathy demonstrated good  understanding of the education provided.     See EMR under Patient Instructions for exercises provided 05/13/2024 .    Assessment     Pain has improved overall. Good carryover with physical therapist recommendations.     Kathy Is progressing well towards her goals.   Pt prognosis is Excellent.     Pt will continue to benefit from skilled outpatient physical therapy to address the deficits listed in the problem list box on initial evaluation, provide pt/family education and to maximize pt's level of independence in the home and community environment.     Pt's spiritual, cultural and educational needs considered and pt agreeable to plan of care and goals.     Anticipated barriers to physical therapy: none    Short Term Goals: 6 weeks   Patient will be independent with pelvic floor relaxation to improve bowel and bladder evacuation. Goal not met - progressing   Patient will be able to demonstrate improved pelvic floor relaxation and contraction on rehabilitative ultrasound. Goal met - 05/13/2024   Patient will report improved water intake. Goal met 02/22/2024      Long Term Goals: 12 weeks   Patient will report urinating every 3-4 hours with strong urine stream. Goal not met - progressing - urinating every 1-2 hours  Patient will deny pelvic pain with vaginal penetration. Goal not met - progressing   Patient will deny pelvic pain at rest to allow for full tolerance for activities of choice. Goal met - 05/13/2024   Patient will demonstrate adequate pelvic floor coordination with contraction and relaxation on  sEMG vs rehabilitative ultrasound.  Goal met - 05/13/2024      Plan     Assess cystoscope results. Monitor bowel evacuation.   Continue with plan of care 1x a week for 12 weeks.     Keven Kirby, PT

## 2024-05-22 ENCOUNTER — OFFICE VISIT (OUTPATIENT)
Dept: UROGYNECOLOGY | Facility: CLINIC | Age: 29
End: 2024-05-22
Payer: COMMERCIAL

## 2024-05-22 VITALS
BODY MASS INDEX: 38.32 KG/M2 | SYSTOLIC BLOOD PRESSURE: 124 MMHG | WEIGHT: 252.88 LBS | HEIGHT: 68 IN | DIASTOLIC BLOOD PRESSURE: 80 MMHG

## 2024-05-22 DIAGNOSIS — R39.89 BLADDER PAIN: ICD-10-CM

## 2024-05-22 DIAGNOSIS — R39.15 URINARY URGENCY: Primary | ICD-10-CM

## 2024-05-22 DIAGNOSIS — R35.0 URINARY FREQUENCY: ICD-10-CM

## 2024-05-22 LAB
BILIRUB SERPL-MCNC: NORMAL MG/DL
BLOOD URINE, POC: NORMAL
CLARITY, POC UA: CLEAR
COLOR, POC UA: NORMAL
GLUCOSE UR QL STRIP: NORMAL
KETONES UR QL STRIP: NORMAL
LEUKOCYTE ESTERASE URINE, POC: NORMAL
NITRITE, POC UA: NORMAL
PH, POC UA: 6
PROTEIN, POC: NORMAL
SPECIFIC GRAVITY, POC UA: 1
UROBILINOGEN, POC UA: NORMAL

## 2024-05-22 PROCEDURE — 52000 CYSTOURETHROSCOPY: CPT | Mod: S$GLB,,, | Performed by: OBSTETRICS & GYNECOLOGY

## 2024-05-22 PROCEDURE — 99499 UNLISTED E&M SERVICE: CPT | Mod: S$GLB,,, | Performed by: OBSTETRICS & GYNECOLOGY

## 2024-05-22 PROCEDURE — 99999 PR PBB SHADOW E&M-EST. PATIENT-LVL IV: CPT | Mod: PBBFAC,,,

## 2024-05-22 PROCEDURE — 81002 URINALYSIS NONAUTO W/O SCOPE: CPT | Mod: S$GLB,,, | Performed by: OBSTETRICS & GYNECOLOGY

## 2024-05-22 RX ORDER — LIDOCAINE HYDROCHLORIDE 20 MG/ML
JELLY TOPICAL ONCE
Status: COMPLETED | OUTPATIENT
Start: 2024-05-22 | End: 2024-05-22

## 2024-05-22 RX ORDER — CIPROFLOXACIN 500 MG/1
500 TABLET ORAL
Status: COMPLETED | OUTPATIENT
Start: 2024-05-22 | End: 2024-05-22

## 2024-05-22 RX ORDER — SPIRONOLACTONE 100 MG/1
150 TABLET, FILM COATED ORAL DAILY
COMMUNITY

## 2024-05-22 RX ADMIN — LIDOCAINE HYDROCHLORIDE 5 ML: 20 JELLY TOPICAL at 08:05

## 2024-05-22 RX ADMIN — CIPROFLOXACIN 500 MG: 500 TABLET ORAL at 08:05

## 2024-05-23 NOTE — PROGRESS NOTES
Title of Operation:   Cystourethroscopy.     INDICATIONS:  29 y.o. Y O F  has a past medical history of Hydradenitis.    PREOPERATIVE DIAGNOSIS  Urinary urgency   Urinary frequency     POSTOPERATIVE DIAGNOSIS:   Urinary urgency   Urinary frequency   OAB    Anesthesia:   2% Xylocaine gel.    Specimen (Bacteriological, Pathological or other):   None.     Prosthetic Device/Implant:   None.     Surgeons Narrative:     After informed consent was obtained, the patient was placed in the lithotomy position. The urethral meatus was prepped with Betadine and 10 cubic centimeters of 2% Xylocaine gel were introduced into the urethra. A flexible cystourethroscope was introduced into the bladder. The bladder was distended with approximately 300 cubic centimeters of sterile water. A systematic survey was performed in which the bladder was surveyed using multiple sequential passes in a clockwise fashion from the bladder dome to the bladder base to the urethrovesical junction generalized hypervascularity and deep trabeculations. There was no glomeruli,  terminal hematuria or hunner ulcerations.  The trigone and ureteral orifices were observed. The scope was then flipped back on itself, and the urethrovesical junction was viewed. A vaginal examining finger was then placed with pressure suburethrally at the urethrovesical junction as the telescope was withdrawn in order to perform positive pressure urethroscopy.  Standard maneuvers of cough, squeeze and Valsalva were performed which were negative. The telescope was then completely withdrawn.     Findings: Urethroscopy:  Normal.  Cystoscopy:  Normal bladder mucosa, bilateral ureteral flow was noted.      Assessment: Essentially normal cystourethroscopy, OAB.      Plan:   Dietary restrictions reviewed  Due to significant constipation recommend discontinuing the vesciare,   Trial [id number] mg, ( BP's diastolic's have been elevated in the past )

## 2024-05-28 ENCOUNTER — PATIENT MESSAGE (OUTPATIENT)
Dept: UROGYNECOLOGY | Facility: CLINIC | Age: 29
End: 2024-05-28
Payer: COMMERCIAL

## 2024-05-28 DIAGNOSIS — R39.15 URINARY URGENCY: Primary | ICD-10-CM

## 2024-05-28 DIAGNOSIS — R39.89 BLADDER PAIN: ICD-10-CM

## 2024-05-28 DIAGNOSIS — R35.0 URINARY FREQUENCY: ICD-10-CM

## 2024-05-29 DIAGNOSIS — F50.81 BINGE EATING DISORDER: ICD-10-CM

## 2024-05-29 NOTE — TELEPHONE ENCOUNTER
No care due was identified.  [facility] Embedded Care Due Messages. Reference number: [id number].   5/29/2024 2:57:15 PM CDT

## 2024-05-31 DIAGNOSIS — R39.89 BLADDER PAIN: Primary | ICD-10-CM

## 2024-05-31 DIAGNOSIS — R39.15 URINARY URGENCY: ICD-10-CM

## 2024-05-31 DIAGNOSIS — R35.0 URINARY FREQUENCY: ICD-10-CM

## 2024-05-31 RX ORDER — LISDEXAMFETAMINE DIMESYLATE 30 MG/1
30 CAPSULE ORAL EVERY MORNING
Qty: 30 CAPSULE | Refills: 0 | Status: SHIPPED | OUTPATIENT
Start: 2024-05-31

## 2024-05-31 RX ORDER — MIRABEGRON 50 MG/1
1 TABLET, EXTENDED RELEASE ORAL DAILY
Qty: 30 TABLET | Refills: 11 | Status: SHIPPED | OUTPATIENT
Start: 2024-05-31 | End: 2025-05-31

## 2024-06-06 ENCOUNTER — CLINICAL SUPPORT (OUTPATIENT)
Dept: REHABILITATION | Facility: HOSPITAL | Age: 29
End: 2024-06-06
Payer: COMMERCIAL

## 2024-06-06 DIAGNOSIS — R10.2 CHRONIC PELVIC PAIN IN FEMALE: Primary | ICD-10-CM

## 2024-06-06 DIAGNOSIS — G89.29 CHRONIC PELVIC PAIN IN FEMALE: Primary | ICD-10-CM

## 2024-06-06 DIAGNOSIS — R27.8 COORDINATION ABNORMAL: ICD-10-CM

## 2024-06-06 DIAGNOSIS — M62.838 MUSCLE SPASM: ICD-10-CM

## 2024-06-06 PROCEDURE — 97530 THERAPEUTIC ACTIVITIES: CPT | Mod: PN | Performed by: PHYSICAL THERAPIST

## 2024-06-06 PROCEDURE — 97140 MANUAL THERAPY 1/> REGIONS: CPT | Mod: PN | Performed by: PHYSICAL THERAPIST

## 2024-06-06 NOTE — PROGRESS NOTES
"  Pelvic Health Physical Therapy   Treatment Note      [facility]  [facility] Number: [id number]    Therapy Diagnosis:   Encounter Diagnoses   Name Primary?    Chronic pelvic pain in female Yes    Coordination abnormal     Muscle spasm        Physician: Padmini Drummond,*    Visit Date: 6/6/2024    Physician Orders: PT Eval and Treat PF PT  Medical Diagnosis from Referral: R10.2,G89.29 (ICD-10-CM) - Chronic pelvic pain in female  Evaluation Date: 1/19/2024  Authorization Period Expiration: 1/10/2025  Plan of Care Expiration: 8/5/2024  Progress Note Due: 6/10/2024  Visit # 13/24 Visits authorized: 12/20  FOTO: 3/3    Cancelled Visits: 0  No Show Visits: 0    Time In: 3:06 PM   Time Out:3:57 PM   Total Billable Time: 51 minutes    Precautions: Standard    Subjective     Pt reports: She is taking Mybetriq. Pain was really good until she had her cystoscope. Still irritated. Stopped running due to lack of time.  Nocturia was good for about 2 weeks. Meds have helped the nocturia. Using vaginal estrogen cream sporadically. Still leaking when she stands after urination - very frustrated with this. No change. Occurs 98% of the time. Bowels are better now that she is off of Vesicare. "Cut off" feeling does not happen every time, but does happen. Using her dilators more than the wand. Not needed the suppositories.       She was compliant with home exercise program.  Response to previous treatment: improved pain and pelvic floor awareness  Functional change: improved pelvic floor awareness    Pain in: 5/10 back left area all the time   Pain out: 5/10 after pelvic floor manual therapy  Location: pelvic pain       Objective     Kathy participated in dynamic functional therapeutic activities to improve functional performance for 21  minutes, including:  Educated on continued management of pelvic pain with body awareness and medication PRN.     Kathy received the following manual therapy techniques: to develop " flexibility, extensibility, and desensitization for 30 minutes including: trigger point/myofascial release of bilateral bulbocavernosus, contract relax to the left obturator internus.          Intervention 03/28/2024 04/04/2024 04/11/2024 05/13/2024 06/06/2024    Neuro Re-Ed Rehabilitative ultrasound     RUSI for breathing, drops, contractions of the PFM to help pt visualize PFM motion and function. and rehabilitative ultrasound imaging to help visualize pelvic floor muscle contraction and relaxation with kegels  Perineal view and lower abdominal view. PVR WNL. Educated on PF down training and mobility to assist with bowel habits.      Manual Ther    Educated on double voiding PRN when she does not empty well. Use of wand with limited movement for PF mobility educated on IC diet and being aware of bladder irritants. trigger point release to the bilateral levator ani with cues for PF relaxation. Contract relax to the obturator internus.  trigger point release to the bilateral psoas, sidelying iliac depression, passive hip extension.   trigger point/myofascial release of bilateral bulbocavernosus, contract relax to the left obturator internus.       TherAct  Educated on double voiding PRN when she does not empty well. Use of wand with limited movement for PF mobility educated on IC diet and being aware of bladder irritants.    Educated on use of vaginal dilators vs pelvic wand on a regular basis to assist with maintaining pelvic floor mobility. Educated on use of TENs unit to assist with pelvic pain as well.  Educated on increased use of the gluts with ambulation, regular mobility of the hip flexors.    Educated on gastro colic reflex and improved bowel habits.    Educated on continued management of pelvic pain with body awareness and medication PRN.        Home Exercises Provided and Patient Education Provided     Education provided:   - anatomy/physiology of pelvic floor, posture/body mechanices, diaphragmatic  breathing, and double voiding techniques  Discussed progression of plan of care with patient; educated pt in activity modification; reviewed HEP with pt. Pt demonstrated and verbalized understanding of all instruction and was provided with a handout of HEP (see Patient Instructions).    Written Home Exercises Provided: yes.  Exercises were reviewed and Kathy was able to demonstrate them prior to the end of the session.  Kathy demonstrated good  understanding of the education provided.     See EMR under Patient Instructions for exercises provided 06/06/2024 .    Assessment     Pain has improved overall. Good carryover with physical therapist recommendations. Pain exacerbation after cystoscope.     Kathy Is progressing well towards her goals.   Pt prognosis is Excellent.     Pt will continue to benefit from skilled outpatient physical therapy to address the deficits listed in the problem list box on initial evaluation, provide pt/family education and to maximize pt's level of independence in the home and community environment.     Pt's spiritual, cultural and educational needs considered and pt agreeable to plan of care and goals.     Anticipated barriers to physical therapy: none    Short Term Goals: 6 weeks   Patient will be independent with pelvic floor relaxation to improve bowel and bladder evacuation. Goal not met - progressing   Patient will be able to demonstrate improved pelvic floor relaxation and contraction on rehabilitative ultrasound. Goal met - 05/13/2024   Patient will report improved water intake. Goal met 02/22/2024      Long Term Goals: 12 weeks   Patient will report urinating every 3-4 hours with strong urine stream. Goal not met - progressing - urinating every 1-2 hours  Patient will deny pelvic pain with vaginal penetration. Goal not met - progressing   Patient will deny pelvic pain at rest to allow for full tolerance for activities of choice. Goal met - 05/13/2024   Patient will demonstrate  adequate pelvic floor coordination with contraction and relaxation on sEMG vs rehabilitative ultrasound.  Goal met - 05/13/2024      Plan     Still with UI after urinating. Rehabilitative ultrasound, pain management. Will have virtual visit with NP for further med recommendations- can discuss vaginal suppositories vs. PF botox with them at that time.       Continue with plan of care 1x a week for 12 weeks.     Keven Kirby, PT

## 2024-06-11 ENCOUNTER — TELEPHONE (OUTPATIENT)
Dept: INTERNAL MEDICINE | Facility: CLINIC | Age: 29
End: 2024-06-11
Payer: COMMERCIAL

## 2024-06-11 ENCOUNTER — PATIENT MESSAGE (OUTPATIENT)
Dept: INTERNAL MEDICINE | Facility: CLINIC | Age: 29
End: 2024-06-11
Payer: COMMERCIAL

## 2024-06-11 DIAGNOSIS — K59.09 CHRONIC CONSTIPATION: ICD-10-CM

## 2024-06-11 DIAGNOSIS — K58.1 IRRITABLE BOWEL SYNDROME WITH CONSTIPATION: ICD-10-CM

## 2024-06-11 NOTE — TELEPHONE ENCOUNTER
No care due was identified.  [facility] Embedded Care Due Messages. Reference number: [id number].   6/11/2024 12:41:25 PM CDT

## 2024-06-19 ENCOUNTER — PATIENT MESSAGE (OUTPATIENT)
Dept: INTERNAL MEDICINE | Facility: CLINIC | Age: 29
End: 2024-06-19
Payer: COMMERCIAL

## 2024-06-19 ENCOUNTER — DOCUMENTATION ONLY (OUTPATIENT)
Dept: BARIATRICS | Facility: CLINIC | Age: 29
End: 2024-06-19
Payer: COMMERCIAL

## 2024-06-27 ENCOUNTER — OFFICE VISIT (OUTPATIENT)
Dept: INTERNAL MEDICINE | Facility: CLINIC | Age: 29
End: 2024-06-27
Payer: COMMERCIAL

## 2024-06-27 ENCOUNTER — CLINICAL SUPPORT (OUTPATIENT)
Dept: REHABILITATION | Facility: HOSPITAL | Age: 29
End: 2024-06-27
Payer: COMMERCIAL

## 2024-06-27 ENCOUNTER — PATIENT MESSAGE (OUTPATIENT)
Dept: INTERNAL MEDICINE | Facility: CLINIC | Age: 29
End: 2024-06-27

## 2024-06-27 DIAGNOSIS — R27.8 COORDINATION ABNORMAL: ICD-10-CM

## 2024-06-27 DIAGNOSIS — G89.29 CHRONIC PELVIC PAIN IN FEMALE: Primary | ICD-10-CM

## 2024-06-27 DIAGNOSIS — M62.838 MUSCLE SPASM: ICD-10-CM

## 2024-06-27 DIAGNOSIS — R10.2 CHRONIC PELVIC PAIN IN FEMALE: Primary | ICD-10-CM

## 2024-06-27 DIAGNOSIS — E66.9 OBESITY, UNSPECIFIED CLASSIFICATION, UNSPECIFIED OBESITY TYPE, UNSPECIFIED WHETHER SERIOUS COMORBIDITY PRESENT: Primary | ICD-10-CM

## 2024-06-27 PROCEDURE — 97112 NEUROMUSCULAR REEDUCATION: CPT | Mod: PN | Performed by: PHYSICAL THERAPIST

## 2024-06-27 PROCEDURE — 97530 THERAPEUTIC ACTIVITIES: CPT | Mod: PN | Performed by: PHYSICAL THERAPIST

## 2024-06-27 RX ORDER — SEMAGLUTIDE 1 MG/.5ML
1 INJECTION, SOLUTION SUBCUTANEOUS
Qty: 2 ML | Refills: 0 | Status: ACTIVE | OUTPATIENT
Start: 2024-08-27

## 2024-06-27 RX ORDER — SEMAGLUTIDE 0.5 MG/.5ML
0.5 INJECTION, SOLUTION SUBCUTANEOUS
Qty: 2 ML | Refills: 0 | Status: ACTIVE | OUTPATIENT
Start: 2024-07-27

## 2024-06-27 RX ORDER — SEMAGLUTIDE 0.25 MG/.5ML
0.25 INJECTION, SOLUTION SUBCUTANEOUS
Qty: 2 ML | Refills: 0 | Status: ACTIVE | OUTPATIENT
Start: 2024-06-27

## 2024-06-27 NOTE — PATIENT INSTRUCTIONS
WEGOVY® (wee-JANAE-vee), (semaglutide) injection, for subcutaneous use  Read this Medication Guide and Instructions for Use before you start using WEGOVY and each time you get a refill. There may be new information. This information does not take the place of talking to your healthcare provider about your medical condition or your treatment.  What is the most important information I should know about WEGOVY?   WEGOVY may cause serious side effects, including:   Possible thyroid tumors, including cancer. Tell your healthcare provider if you get a lump or swelling in your neck, hoarseness, trouble swallowing, or shortness of breath. These may be symptoms of thyroid cancer. In studies with rodents, WEGOVY and medicines that work like WEGOVY caused thyroid tumors, including thyroid cancer. It is not known if WEGOVY will cause thyroid tumors or a type of thyroid cancer called medullary thyroid carcinoma (MTC) in people.   Do not use WEGOVY if you or any of your family have ever had a type of thyroid cancer called medullary thyroid carcinoma (MTC), or if you have an endocrine system condition called Multiple Endocrine Neoplasia syndrome type 2 (MEN 2).  What is WEGOVY?   WEGOVY is an injectable prescription medicine used with a reduced calorie diet and increased physical activity:   to reduce the risk of major cardiovascular events such as death, heart attack, or stroke in adults with known heart disease and with either obesity or overweight.   that may help adults and children aged 12 years and older with obesity, or some adults with overweight who also have weight-related medical problems, to help them lose excess body weight and keep the weight off.   WEGOVY contains semaglutide and should not be used with other semaglutide-containing products or other GLP-1 receptor agonist medicines.  It is not known if WEGOVY is safe and effective for use in children under 12 years of age.  Do not use WEGOVY if:   you or any of your  family have ever had a type of thyroid cancer called medullary thyroid carcinoma (MTC) or if you have an endocrine system condition called Multiple Endocrine Neoplasia syndrome type 2 (MEN 2).   you have had a serious allergic reaction to semaglutide or any of the ingredients in WEGOVY. See the end of this Medication Guide for a complete list of ingredients in WEGOVY. Symptoms of a serious allergic reaction include:   swelling of your face, lips, tongue or throat   fainting or feeling dizzy   problems breathing or swallowing   very rapid heartbeat   severe rash or itching  Before using WEGOVY, tell your healthcare provider if you have any other medical conditions, including if you:   have or have had problems with your pancreas or kidneys.   have type 2 diabetes and a history of diabetic retinopathy.   have or have had depression or suicidal thoughts, or mental health issues.   are pregnant or plan to become pregnant. WEGOVY may harm your unborn baby. You should stop using WEGOVY 2 months before you plan to become pregnant.   Pregnancy Exposure Registry: There is a pregnancy exposure registry for women who use WEGOVY during pregnancy. The purpose of this registry is to collect information about the health of you and your baby. Talk to your healthcare provider about how you can take part in this registry or you may contact [employer] at 1-232.850.1321.   are breastfeeding or plan to breastfeed. It is not known if WEGOVY passes into your breast milk. You should talk with your healthcare provider about the best way to feed your baby while using WEGOVY.   Tell your healthcare provider about all the medicines you take, including prescription and over-the-counter medicines, vitamins, and herbal supplements. WEGOVY may affect the way some medicines work and some medicines may affect the way WEGOVY works. Tell your healthcare provider if you are taking other medicines to treat diabetes, including sulfonylureas or insulin.  WEGOVY slows stomach emptying and can affect medicines that need to pass through the stomach quickly.   Know the medicines you take. Keep a list of them to show your healthcare provider and pharmacist when you get a new medicine.  How should I use WEGOVY?   Read the Instructions for Use that comes with WEGOVY.   Use WEGOVY exactly as your healthcare provider tells you to.   Your healthcare provider should show you how to use WEGOVY before you use it for the first time.   WEGOVY is injected under the skin (subcutaneously) of your stomach (abdomen), thigh, or upper arm. Do not inject WEGOVY into a muscle (intramuscularly) or vein (intravenously).   Change (rotate) your injection site with each injection. Do not use the same site for each injection.   Use WEGOVY 1 time each week, on the same day each week, at any time of the day.   Start WEGOVY with 0.25 mg per week in your first month. In your second month, increase your weekly dose to 0.5 mg. In the third month, increase your weekly dose to 1 mg. In the fourth month, increase your weekly dose to 1.7 mg. In the fifth month onwards, your healthcare provider may either maintain your dose at 1.7 mg weekly or increase your weekly dose to 2.4 mg.   If you need to change the day of the week, you may do so as long as your last dose of WEGOVY was given 2 or more days before.   If you miss a dose of WEGOVY and the next scheduled dose is more than 2 days away (48 hours), take the missed dose as soon as possible. If you miss a dose of WEGOVY and the next schedule dose is less than 2 days away (48 hours), do not administer the dose. Take your next dose on the regularly scheduled day.   If you miss doses of WEGOVY for more than 2 weeks, take your next dose on the regularly scheduled day or call your healthcare provider to talk about how to restart your treatment.   You can take WEGOVY with or without food.   If you take too much WEGOVY, you may have severe nausea, severe vomiting  and severe low blood sugar. Call your healthcare provider or go to the nearest hospital emergency room right away if you experience any of these symptoms.  What are the possible side effects of WEGOVY?   WEGOVY may cause serious side effects, including:   See What is the most important information I should know about WEGOVY?   inflammation of your pancreas (pancreatitis). Stop using WEGOVY and call your healthcare provider right away if you have severe pain in your stomach area (abdomen) that will not go away, with or without vomiting. You may feel the pain from your abdomen to your back.   gallbladder problems. WEGOVY may cause gallbladder problems including gallstones. Some gallbladder problems need surgery. Call your healthcare provider if you have any of the following symptoms:   pain in your upper stomach (abdomen)   yellowing of skin or eyes (jaundice)   fever   agatha-colored stools   increased risk of low blood sugar (hypoglycemia), especially those who also take medicines to treat diabetes mellitus such as insulin or sulfonylureas. Low blood sugar in patients with diabetes who receive WEGOVY can be a serious side effect. Talk to your healthcare provider about how to recognize and treat low blood sugar. You should check your blood sugar before you start taking WEGOVY and while you take WEGOVY. Signs and symptoms of low blood sugar may include:   dizziness or light-headedness   sweating   shakiness   blurred vision   slurred speech   weakness   anxiety   hunger   headache   irritability or mood changes   confusion or drowsiness   fast heartbeat   feeling jittery  kidney problems (kidney failure). In people who have kidney problems, diarrhea, nausea, and vomiting may cause a loss of fluids (dehydration) which may cause kidney problems to get worse. It is important for you to drink fluids to help reduce your chance of dehydration.   serious allergic reactions. Stop using WEGOVY and get medical help right away,  if you have any symptoms of a serious allergic reaction including:   swelling of your face, lips, tongue   severe rash or itching o very rapid heartbeat or throat   problems breathing or swallowing fainting or feeling dizzy   change in vision in people with type 2 diabetes. Tell your healthcare provider if you have changes in vision during treatment with WEGOVY.   increased heart rate. WEGOVY can increase your heart rate while you are at rest. Your healthcare provider should check your heart rate while you take WEGOVY. Tell your healthcare provider if you feel your heart racing or pounding in your chest and it lasts for several minutes.   depression or thoughts of suicide. You should pay attention to any mental changes, especially sudden changes in your mood, behaviors, thoughts, or feelings. Call your healthcare provider right away if you have any mental changes that are new, worse, or worry you.   The most common side effects of WEGOVY in adults or children aged 12 years and older may include:  nausea   stomach (abdomen) pain   dizziness   gas   diarrhea   headache   feeling bloated   stomach flu   vomiting   tiredness (fatigue)   belching   heartburn   constipation   upset stomach   low blood sugar in people   runny nose or sore throat with type 2 diabetes   Talk to your healthcare provider about any side effect that bothers you or does not go away. These are not all the possible side effects of WEGOVY. Call your doctor for medical advice about side effects. You may report side effects to FDA at 1-523-FDA-5409.  General information about the safe and effective use of WEGOVY.  Medicines are sometimes prescribed for purposes other than those listed in a Medication Guide. Do not use WEGOVY for a condition for which it was not prescribed. Do not give WEGOVY to other people, even if they have the same symptoms that you have. It may harm them. You can ask your pharmacist or healthcare provider for information about  WEGOVY that is written for health professionals.  What are the ingredients in WEGOVY?   Active Ingredient: semaglutide   Inactive Ingredients: disodium phosphate dihydrate, 1.42 mg; sodium chloride, 8.25 mg; water for injection; and hydrochloric acid or sodium hydroxide may be added to adjust pH.  For more information, go to [employer] or call 9-330-Uwybkn-5.

## 2024-06-27 NOTE — PROGRESS NOTES
Patient ID: Kathy Beltrán is a 29 y.o. White female    Subjective  Chief Complaint: patient presents for medical weight loss management.    Contraindications to GLP-1 receptor agonist therapy:   Denies personal or family history of MTC, personal history of MEN2, history of allergic reaction while taking a GLP-1 receptor agonist, and history of pancreatitis while taking a GLP-1 receptor agonist     Hx of BED noted in chart. Pt reviewed by Dr. Sabina Barton with approval for therapy.    History of weight loss therapy:  Reports previously on Mounjaro - has been off since Jan 2024. Reports tolerated it well. Would like to restart therapy with Wegovy today.     Weight loss history:  Starting weight:    6/17/2024   Recent Readings    Weight (lbs) 243 lb    BMI 36.94 BMI          Objective  Lab Results   Component Value Date     11/24/2023     06/23/2023     02/07/2023     Lab Results   Component Value Date    K 4.1 11/24/2023    K 4.5 06/23/2023    K 4.9 02/07/2023     Lab Results   Component Value Date     11/24/2023     06/23/2023     02/07/2023     Lab Results   Component Value Date    CO2 27 11/24/2023    CO2 23 06/23/2023    CO2 22 (L) 02/07/2023     Lab Results   Component Value Date    BUN 10 11/24/2023    BUN 12 06/23/2023    BUN 12 02/07/2023     Lab Results   Component Value Date    GLU 80 11/24/2023    GLU 94 06/23/2023    GLU 87 02/07/2023     Lab Results   Component Value Date    CALCIUM 9.4 11/24/2023    CALCIUM 9.7 06/23/2023    CALCIUM 9.5 02/07/2023     Lab Results   Component Value Date    PROT 7.6 11/24/2023    PROT 7.8 06/23/2023    PROT 7.6 02/07/2023     Lab Results   Component Value Date    ALBUMIN 4.0 11/24/2023    ALBUMIN 4.0 06/23/2023    ALBUMIN 4.0 02/07/2023     Lab Results   Component Value Date    BILITOT 0.3 11/24/2023    BILITOT 0.4 06/23/2023    BILITOT 0.5 02/07/2023     Lab Results   Component Value Date    AST 13 11/24/2023    AST 16  06/23/2023    AST 15 02/07/2023     Lab Results   Component Value Date    ALT 12 11/24/2023    ALT 12 06/23/2023    ALT 12 02/07/2023     Lab Results   Component Value Date    ANIONGAP 7 (L) 11/24/2023    ANIONGAP 11 06/23/2023    ANIONGAP 11 02/07/2023     Lab Results   Component Value Date    CREATININE 0.8 11/24/2023    CREATININE 0.8 06/23/2023    CREATININE 0.8 02/07/2023     Lab Results   Component Value Date    EGFRNORACEVR >60 11/24/2023    EGFRNORACEVR >60 06/23/2023    EGFRNORACEVR >60 02/07/2023         Assessment/Plan  -Pt qualifies for GLP-1 RA therapy based on BMI greater than or equal to 30 kg/m2  -Initiate Wegovy 0.25 mg once weekly for 1 month  -Then increase to Wegovy 0.5 mg once weekly for 1 month  -Then increase to Wegovy 1 mg once weekly  -RTC in 3 months        Patient consented to pharmacist management via collaborative practice.

## 2024-06-27 NOTE — PROGRESS NOTES
"  Pelvic Health Physical Therapy   Treatment Note and Progress Note     Name: Kathy Palomo [facility]  Clinic Number: [id number]    Therapy Diagnosis:   Encounter Diagnoses   Name Primary?    Chronic pelvic pain in female Yes    Coordination abnormal     Muscle spasm        Physician: Padmini Drummond,*    Visit Date: 6/27/2024    Physician Orders: PT Eval and Treat PF PT  Medical Diagnosis from Referral: R10.2,G89.29 (ICD-10-CM) - Chronic pelvic pain in female  Evaluation Date: 1/19/2024  Authorization Period Expiration: 1/10/2025  Plan of Care Expiration: 8/5/2024  Progress Note Due: 7/25/2024  Visit # 14/24 Visits authorized: 13/20  FOTO: 3/3    Cancelled Visits: 0  No Show Visits: 0    Time In: 3:30 PM   Time Out: 4:18 PM    Total Billable Time: 48 minutes    Precautions: Standard    Subjective     Pt reports: Has not used vaginal suppositories since her cystoscope. Still with urgency/frequency of urine. Flares up after intercourse with pain and burning - "nerves in the back area". This has gotten better, but still flares. Happens within 10-15 min after. Getting up at night to urinate 1-2 times - not every night. Not sure if she is sleeping poorly. Still using vaginal estrogen 2xs a week. Still with leakage of urine with standing. Still with cutting off feeling. Still compliant with dilators - easier than wand. Using dilators 3xs a week. Denies leakage with sneezing, laughing, and exercise.       She was compliant with home exercise program.  Response to previous treatment: improved pain and pelvic floor awareness  Functional change: improved pelvic floor awareness    Pain in: 1/10 bladder pain   Pain out: 1/10 after pelvic floor manual therapy  Location: pelvic pain       Objective     Kathy participated in dynamic functional therapeutic activities to improve functional performance for 25 minutes, including:  More wand use, Educated on Oh Nut for deep penetration pain   Kathy participated in neuromuscular " re-education activities to develop Coordination, Down training, and Proprioception for 18 minutes including: RUSI for breathing, drops, contractions of the PFM to help pt visualize PFM motion and function.  Did well with pelvic floor coordination and awareness.         Intervention 03/28/2024 04/04/2024 04/11/2024 05/13/2024 06/06/2024 06/27/2024    Neuro Re-Ed Rehabilitative ultrasound     RUSI for breathing, drops, contractions of the PFM to help pt visualize PFM motion and function. and rehabilitative ultrasound imaging to help visualize pelvic floor muscle contraction and relaxation with kegels  Perineal view and lower abdominal view. PVR WNL. Educated on PF down training and mobility to assist with bowel habits.     RUSI for breathing, drops, contractions of the PFM to help pt visualize PFM motion and function.  Did well with pelvic floor coordination and awareness.        Manual Ther    Educated on double voiding PRN when she does not empty well. Use of wand with limited movement for PF mobility educated on IC diet and being aware of bladder irritants. trigger point release to the bilateral levator ani with cues for PF relaxation. Contract relax to the obturator internus.  trigger point release to the bilateral psoas, sidelying iliac depression, passive hip extension.   trigger point/myofascial release of bilateral bulbocavernosus, contract relax to the left obturator internus.        TherAct  Educated on double voiding PRN when she does not empty well. Use of wand with limited movement for PF mobility educated on IC diet and being aware of bladder irritants.    Educated on use of vaginal dilators vs pelvic wand on a regular basis to assist with maintaining pelvic floor mobility. Educated on use of TENs unit to assist with pelvic pain as well.  Educated on increased use of the gluts with ambulation, regular mobility of the hip flexors.    Educated on gastro colic reflex and improved bowel habits.     Educated on continued management of pelvic pain with body awareness and medication PRN.    More wand use, Educated on Oh Nut for deep penetration pain        Home Exercises Provided and Patient Education Provided     Education provided:   - anatomy/physiology of pelvic floor, posture/body mechanices, diaphragmatic breathing, and double voiding techniques  Discussed progression of plan of care with patient; educated pt in activity modification; reviewed HEP with pt. Pt demonstrated and verbalized understanding of all instruction and was provided with a handout of HEP (see Patient Instructions).    Written Home Exercises Provided: yes.  Exercises were reviewed and Kathy was able to demonstrate them prior to the end of the session.  Kathy demonstrated good  understanding of the education provided.     See EMR under Patient Instructions for exercises provided 06/27/2024 .    Assessment     Pain has improved overall. Good carryover with physical therapist recommendations. Pain exacerbation after cystoscope.     Kathy Is progressing well towards her goals.   Pt prognosis is Excellent.     Pt will continue to benefit from skilled outpatient physical therapy to address the deficits listed in the problem list box on initial evaluation, provide pt/family education and to maximize pt's level of independence in the home and community environment.     Pt's spiritual, cultural and educational needs considered and pt agreeable to plan of care and goals.     Anticipated barriers to physical therapy: none    Short Term Goals: 6 weeks   Patient will be independent with pelvic floor relaxation to improve bowel and bladder evacuation. Goal met - 06/27/2024   Patient will be able to demonstrate improved pelvic floor relaxation and contraction on rehabilitative ultrasound. Goal met - 05/13/2024   Patient will report improved water intake. Goal met 02/22/2024      Long Term Goals: 12 weeks   Patient will report urinating every 3-4 hours  with strong urine stream. Goal met - 06/27/2024   Patient will deny pelvic pain with vaginal penetration. Goal met - 06/27/2024   Patient will deny pelvic pain at rest to allow for full tolerance for activities of choice. Goal met - 05/13/2024   Patient will demonstrate adequate pelvic floor coordination with contraction and relaxation on sEMG vs rehabilitative ultrasound.  Goal met - 05/13/2024      New goals:   Patient will report improved urinary incontinence.     Plan     Still with UI after urinating. Rehabilitative ultrasound, pain management.   Continue with plan of care 1x a month. Consider discharge. Follow up after urogyn visit.     Keven Kirby, PT

## 2024-07-01 ENCOUNTER — PATIENT MESSAGE (OUTPATIENT)
Dept: INTERNAL MEDICINE | Facility: CLINIC | Age: 29
End: 2024-07-01
Payer: COMMERCIAL

## 2024-07-01 DIAGNOSIS — E66.09 CLASS 2 OBESITY DUE TO EXCESS CALORIES WITHOUT SERIOUS COMORBIDITY WITH BODY MASS INDEX (BMI) OF 38.0 TO 38.9 IN ADULT: ICD-10-CM

## 2024-07-01 DIAGNOSIS — F50.81 BINGE EATING DISORDER: ICD-10-CM

## 2024-07-01 DIAGNOSIS — F50.81 BINGE EATING DISORDER: Primary | ICD-10-CM

## 2024-07-01 RX ORDER — LISDEXAMFETAMINE DIMESYLATE 30 MG/1
30 CAPSULE ORAL EVERY MORNING
Qty: 30 CAPSULE | Refills: 0 | Status: SHIPPED | OUTPATIENT
Start: 2024-07-01

## 2024-07-01 RX ORDER — LISDEXAMFETAMINE DIMESYLATE 30 MG/1
30 CAPSULE ORAL EVERY MORNING
Qty: 30 CAPSULE | Refills: 0 | Status: SHIPPED | OUTPATIENT
Start: 2024-07-01 | End: 2024-07-01 | Stop reason: SDUPTHER

## 2024-07-01 NOTE — TELEPHONE ENCOUNTER
No care due was identified.  [facility] Embedded Care Due Messages. Reference number: [id number].   7/01/2024 2:55:18 PM CDT

## 2024-07-01 NOTE — TELEPHONE ENCOUNTER
Medication was sent to wrong pharmacy earlier. Please resend.     Requested Prescriptions     Pending Prescriptions Disp Refills    lisdexamfetamine (VYVANSE) 30 MG capsule 30 capsule 0     Sig: Take 1 capsule (30 mg total) by mouth every morning.

## 2024-07-03 ENCOUNTER — OFFICE VISIT (OUTPATIENT)
Dept: UROGYNECOLOGY | Facility: CLINIC | Age: 29
End: 2024-07-03
Payer: COMMERCIAL

## 2024-07-03 ENCOUNTER — TELEPHONE (OUTPATIENT)
Dept: UROGYNECOLOGY | Facility: CLINIC | Age: 29
End: 2024-07-03
Payer: COMMERCIAL

## 2024-07-03 DIAGNOSIS — R39.15 URINARY URGENCY: ICD-10-CM

## 2024-07-03 DIAGNOSIS — R35.0 URINARY FREQUENCY: ICD-10-CM

## 2024-07-03 DIAGNOSIS — R39.89 BLADDER PAIN: Primary | ICD-10-CM

## 2024-07-03 PROCEDURE — 99213 OFFICE O/P EST LOW 20 MIN: CPT | Mod: 95,,, | Performed by: PHYSICIAN ASSISTANT

## 2024-07-03 RX ORDER — METHENAMINE, SODIUM PHOSPHATE, MONOBASIC, MONOHYDRATE, PHENYL SALICYLATE, METHYLENE BLUE, AND HYOSCYAMINE SULFATE 118; 40.8; 36; 10; .12 MG/1; MG/1; MG/1; MG/1; MG/1
1 CAPSULE ORAL 4 TIMES DAILY
Qty: 20 CAPSULE | Refills: 11 | Status: SHIPPED | OUTPATIENT
Start: 2024-07-03

## 2024-07-03 RX ORDER — MIRABEGRON 50 MG/1
1 TABLET, EXTENDED RELEASE ORAL DAILY
Qty: 30 TABLET | Refills: 11 | Status: SHIPPED | OUTPATIENT
Start: 2024-07-03 | End: 2025-07-03

## 2024-07-03 NOTE — PATIENT INSTRUCTIONS
OAB vs Painful bladder syndrome   --cysto 5/22/24 normal  --Empty bladder every 2-3 hours even if you don't have the urge.  Empty well: wait a minute, lean forward on toilet.    --Try not to go more frequently than once an hour. When you get the urge to urinate after you have just gone, distract yourself until the urge passes.   --Avoid dietary irritants (see sheet).  Keep diary x 3-5 days to determine your irritants.  --KEGELS: do 10 in AM and 10 in PM, holding each x 10 seconds.  When you feel urge to go, STOP, KEGEL, and when urge has passed, then go to bathroom.    --Continue Pelvic Floor Physical Therapy (PT) exercises.    --URGE LEAKAGE: Continue Myrbetriq 50 mg daily. Takes 4 weeks to see if will have effect.   --STRESS LEAKAGE (leak with laugh/cough/sneeze):  Pessary vs. Mid-urethral sling surgery vs Impressa.   --Start Uribel up to 4 times daily as needed for urinary discomfort/bladder pain  --Start the IC diet:  https://www.ichelp.org/wp-content/uploads/2015/07/food-list.pdf   --Start Prelief to help alkinize the urine:   --Prelief Tablets : Each tablet contains 345 mg calcium glycerophosphate (65 mg of elemental calcium). The tablets also contain 0.25% magnesium  stearate as a processing aid. Two tablets are equivalent to 690 mg calcium glycerophosphate (130mg of elemental calcium).   --Prelief Powder : Each ¼ teaspoon usage of powder is comparable to two tablets. The powder dissolves rapidly in food or non-alcoholic beverages.  Tablets are recommended for taking with alcoholic beverages   --Usage:     --Tablets: 2-3 tablets, 2 times a day.    --Powder: ¼ teaspoon of powder 2 times a day. More can be used when needed  --continue to use vaginal estrogen inside vagina 2 nights per week    Follow up in 6-8 weeks (can be virtual)

## 2024-07-03 NOTE — PROGRESS NOTES
The patient location is: [place], [place]  The chief complaint leading to consultation is: bladder pain    Visit type: audio only, 11 minute phone conversation    Face to Face time with patient: 0  17 minutes of total time spent on the encounter, which includes face to face time and non-face to face time preparing to see the patient (eg, review of tests), Obtaining and/or reviewing separately obtained history, Documenting clinical information in the electronic or other health record, Independently interpreting results (not separately reported) and communicating results to the patient/family/caregiver, or Care coordination (not separately reported).         Each patient to whom he or she provides medical services by telemedicine is:  (1) informed of the relationship between the physician and patient and the respective role of any other health care provider with respect to management of the patient; and (2) notified that he or she may decline to receive medical services by telemedicine and may withdraw from such care at any time.    Notes:     [facility] - UROGYNECOLOGY  [address] [place] [id number]  July 3, 2024     Kathy Beltrán  [id number]  1995    UROGYN FOLLOW-UP  7/3/2024     29 y.o. female,   presents for urogyn follow up.    .     2024:  29 y.o.  female  has a past medical history of Hydradenitis.  Referred by Dr. Drummond for evalution of sensation of incomplete bladder emtying. She has a long history of chronic pelvic pain. She had a diagnostic laparoscopy that did not demonstrate endometriosis. She recall having enureses until she was 12 year she dilation and started on meds ( desmopressin ?) which helped her     Changes from last visit:  Patient has been taking Myrbetriq for 3 weeks because Gemtesa was denied. She is still having urinary urgency and lower abdominal pain, unsure if myrbetriq is helping. She thinks pain is worse after intercourse and after  eating certain foods. She feels frustrated that she isn't getting better. She is using vaginal estrogen cream 2 nights per week.     Past Medical History:   Diagnosis Date    Hydradenitis        Past Surgical History:   Procedure Laterality Date    ADENOIDECTOMY      BREAST BIOPSY       SECTION          CHOLECYSTECTOMY      CHOLESTEATOMA EXCISION      DIAGNOSTIC LAPAROSCOPY N/A 2023    Procedure: LAPAROSCOPY, DIAGNOSTIC;  Surgeon: Padmini Drummond MD;  Location: [facility];  Service: OB/GYN;  Laterality: N/A;    INTRAUTERINE DEVICE INSERTION N/A 2023    Procedure: INSERTION, INTRAUTERINE DEVICE (KYLEENA);  Surgeon: Padmini Drummond MD;  Location: [facility];  Service: OB/GYN;  Laterality: N/A;    OPEN REDUCTION AND INTERNAL FIXATION (ORIF) OF INJURY OF ELBOW Left     ?    REMOVAL OF INTRAUTERINE DEVICE (IUD) N/A 2023    Procedure: REMOVAL, INTRAUTERINE DEVICE;  Surgeon: Padmini Drummond MD;  Location: [facility];  Service: OB/GYN;  Laterality: N/A;    TONSILLECTOMY         Family History   Problem Relation Name Age of Onset    Hypertension Maternal Grandmother Katelynn     Heart disease Maternal Grandmother Katelynn     Thyroid disease Maternal Grandmother Katelynn     Hypertension Maternal Grandfather Wilber     Heart disease Maternal Grandfather Wilber     Hypertension Father Rafael     Heart disease Father Rafael     Hypertension Mother Ajit     Heart disease Mother Ajit     Asthma Mother Ajit     Asthma Sister Jennyfer     Breast cancer Neg Hx         Social History     Socioeconomic History    Marital status:    Tobacco Use    Smoking status: Never     Passive exposure: Never    Smokeless tobacco: Never   Substance and Sexual Activity    Alcohol use: Yes     Comment: Social drinker    Drug use: Never    Sexual activity: Yes     Partners: Male     Birth control/protection: I.U.D.     Comment: Dennis     Social Determinants of Health     Financial Resource Strain:  Patient Declined (11/27/2023)    Overall Financial Resource Strain (CARDIA)     Difficulty of Paying Living Expenses: Patient declined   Food Insecurity: Patient Declined (11/27/2023)    Hunger Vital Sign     Worried About Running Out of Food in the Last Year: Patient declined     Ran Out of Food in the Last Year: Patient declined   Transportation Needs: No Transportation Needs (11/27/2023)    PRAPARE - Transportation     Lack of Transportation (Medical): No     Lack of Transportation (Non-Medical): No   Physical Activity: Unknown (11/27/2023)    Exercise Vital Sign     Days of Exercise per Week: Patient declined   Stress: Patient Declined (11/27/2023)    [employer] - Occupational Stress Questionnaire     Feeling of Stress : Patient declined   Housing Stability: Low Risk  (11/27/2023)    Housing Stability Vital Sign     Unable to Pay for Housing in the Last Year: No     Number of Places Lived in the Last Year: 1     Unstable Housing in the Last Year: No       Current Outpatient Medications   Medication Sig Dispense Refill    buPROPion (WELLBUTRIN XL) 150 MG TB24 tablet Take 1 tablet (150 mg total) by mouth once daily. 30 tablet 11    clindamycin (CLEOCIN T) 1 % external solution Apply topically as needed (hydradenitis). (Patient not taking: Reported on 5/22/2024)      conjugated estrogens (PREMARIN) vaginal cream 1 g with applicator or dime-sized amt with finger in vagina nightly x 2 weeks, then twice a week thereafter 45 g 12    doxycycline (VIBRAMYCIN) 100 MG Cap Take 100 mg by mouth once daily.      lactulose (CHRONULAC) 10 gram/15 mL solution Take 30 mLs (20 g total) by mouth 2 (two) times daily as needed (as needed for consipation , if no BM in 2-3 days). (Patient not taking: Reported on 5/22/2024) 240 mL 0    lamoTRIgine (LAMICTAL) 200 MG tablet Take 200 mg by mouth once daily.      levonorgestreL (KYLEENA) 17.5 mcg/24 hr (5 yrs) 19.5 mg IUD 1 each by Intrauterine route once.       LIDOCAINE 2 %, VALIUM 5 MG, BACLOFEN 4 % SUPPOSITORY Place 1 suppository vaginally 2 (two) times daily. (Patient not taking: Reported on 2024) 12 each 0    linaCLOtide (LINZESS) 145 mcg Cap capsule Take 1 capsule (145 mcg total) by mouth daily 30 minutes before food in AM 90 capsule 0    lisdexamfetamine (VYVANSE) 30 MG capsule Take 1 capsule (30 mg total) by mouth every morning. 30 capsule 0    mirabegron (MYRBETRIQ) 50 mg Tb24 Take 1 tablet (50 mg total) by mouth Daily. 30 tablet 11    norethindrone (AYGESTIN) 5 mg Tab Take 1 tablet (5 mg total) by mouth once daily. 30 tablet 11    semaglutide, weight loss, (WEGOVY) 0.25 mg/0.5 mL PnIj Inject 0.25 mg into the skin every 7 days. 2 mL 0    [START ON 2024] semaglutide, weight loss, (WEGOVY) 0.5 mg/0.5 mL PnIj Inject 0.5 mg into the skin every 7 days. 2 mL 0    [START ON 2024] semaglutide, weight loss, (WEGOVY) 1 mg/0.5 mL PnIj Inject 1 mg into the skin every 7 days. 2 mL 0    solifenacin (VESICARE) 5 MG tablet Take 1 tablet (5 mg total) by mouth once daily. (Patient not taking: Reported on 2024) 30 tablet 11    spironolactone (ALDACTONE) 100 MG tablet Take 150 mg by mouth once daily.       No current facility-administered medications for this visit.       Review of patient's allergies indicates:   Allergen Reactions    Pcn [penicillins]      Was told had swelling as a baby    Sulfa (sulfonamide antibiotics)      hives       OB History          3    Para   1    Term   1            AB   2    Living   1         SAB   2    IAB        Ectopic        Multiple        Live Births   1                  ROS  As per HPI.      Physical Exam  There were no vitals taken for this visit.  General: alert and oriented, no acute distress  Respiratory: normal respiratory effort  Pelvic:  deferred    Impression  No diagnosis found.  We reviewed the above issues and discussed options for short-term versus long-term management of her problems.     Plan:    OAB vs Painful bladder syndrome   --cysto 5/22/24 normal  --Empty bladder every 2-3 hours even if you don't have the urge.  Empty well: wait a minute, lean forward on toilet.    --Try not to go more frequently than once an hour. When you get the urge to urinate after you have just gone, distract yourself until the urge passes.   --Avoid dietary irritants (see sheet).  Keep diary x 3-5 days to determine your irritants.  --KEGELS: do 10 in AM and 10 in PM, holding each x 10 seconds.  When you feel urge to go, STOP, KEGEL, and when urge has passed, then go to bathroom.    --Continue Pelvic Floor Physical Therapy (PT) exercises.    --URGE LEAKAGE: Continue Myrbetriq 50 mg daily. Takes 4 weeks to see if will have effect.   --STRESS LEAKAGE (leak with laugh/cough/sneeze):  Pessary vs. Mid-urethral sling surgery vs Impressa.   --Start Uribel up to 4 times daily as needed for urinary discomfort/bladder pain  --Start the IC diet:  https://www.ichelp.org/wp-content/uploads/2015/07/food-list.pdf   --Start Prelief to help alkinize the urine:   --Prelief Tablets : Each tablet contains 345 mg calcium glycerophosphate (65 mg of elemental calcium). The tablets also contain 0.25% magnesium  stearate as a processing aid. Two tablets are equivalent to 690 mg calcium glycerophosphate (130mg of elemental calcium).   --Prelief Powder : Each ¼ teaspoon usage of powder is comparable to two tablets. The powder dissolves rapidly in food or non-alcoholic beverages.  Tablets are recommended for taking with alcoholic beverages   --Usage:     --Tablets: 2-3 tablets, 2 times a day.    --Powder: ¼ teaspoon of powder 2 times a day. More can be used when needed  --continue to use vaginal estrogen inside vagina 2 nights per week    Follow up in 6-8 weeks (can be virtual)    11 minutes were spent  with this patient  100 % of this time was spent in counseling and/or coordination of care    Myron Golden PA-C  Division of Female Pelvic Medicine and  Reconstructive Surgery  Department of Obstetrics & Gynecology  [facility] [place], [place]

## 2024-07-03 NOTE — TELEPHONE ENCOUNTER
----- Message from Angeles Montalvo sent at 7/3/2024  4:16 PM CDT -----  Regarding: appt  Name of Who is Calling:Pt         What is the request in detail: Requesting callback in regards to reschedule   Pt had virtual appt that provider didn't show up for after an hour.   Pt wants the office to not charge her           Can the clinic reply by [facility]: yes         What Number to Call Back if not in [facility]:Telephone Information:  Mobile          [phone]

## 2024-07-16 ENCOUNTER — PATIENT MESSAGE (OUTPATIENT)
Dept: INTERNAL MEDICINE | Facility: CLINIC | Age: 29
End: 2024-07-16
Payer: COMMERCIAL

## 2024-07-16 DIAGNOSIS — F32.5 MAJOR DEPRESSIVE DISORDER WITH SINGLE EPISODE, IN FULL REMISSION: ICD-10-CM

## 2024-07-16 DIAGNOSIS — F50.81 BINGE EATING DISORDER: ICD-10-CM

## 2024-07-16 DIAGNOSIS — E66.09 CLASS 2 OBESITY DUE TO EXCESS CALORIES WITHOUT SERIOUS COMORBIDITY WITH BODY MASS INDEX (BMI) OF 38.0 TO 38.9 IN ADULT: Primary | ICD-10-CM

## 2024-07-24 DIAGNOSIS — E66.09 CLASS 2 OBESITY DUE TO EXCESS CALORIES WITHOUT SERIOUS COMORBIDITY WITH BODY MASS INDEX (BMI) OF 39.0 TO 39.9 IN ADULT: ICD-10-CM

## 2024-07-24 DIAGNOSIS — F32.5 MAJOR DEPRESSIVE DISORDER WITH SINGLE EPISODE, IN FULL REMISSION: ICD-10-CM

## 2024-07-24 RX ORDER — BUPROPION HYDROCHLORIDE 150 MG/1
150 TABLET ORAL
Qty: 90 TABLET | Refills: 1 | Status: SHIPPED | OUTPATIENT
Start: 2024-07-24

## 2024-07-24 NOTE — TELEPHONE ENCOUNTER
Refill Decision Note   Kathy Beltrán  is requesting a refill authorization.  Brief Assessment and Rationale for Refill:  Approve     Medication Therapy Plan:        Comments:     Note composed:10:44 AM 07/24/2024

## 2024-07-24 NOTE — TELEPHONE ENCOUNTER
No care due was identified.  [facility] Embedded Care Due Messages. Reference number: [id number].   7/24/2024 7:03:26 AM CDT

## 2024-07-25 ENCOUNTER — LAB VISIT (OUTPATIENT)
Dept: LAB | Facility: HOSPITAL | Age: 29
End: 2024-07-25
Attending: OBSTETRICS & GYNECOLOGY
Payer: COMMERCIAL

## 2024-07-25 ENCOUNTER — CLINICAL SUPPORT (OUTPATIENT)
Dept: REHABILITATION | Facility: HOSPITAL | Age: 29
End: 2024-07-25
Payer: COMMERCIAL

## 2024-07-25 DIAGNOSIS — F50.81 BINGE EATING DISORDER: ICD-10-CM

## 2024-07-25 DIAGNOSIS — E66.09 CLASS 2 OBESITY DUE TO EXCESS CALORIES WITHOUT SERIOUS COMORBIDITY WITH BODY MASS INDEX (BMI) OF 38.0 TO 38.9 IN ADULT: ICD-10-CM

## 2024-07-25 DIAGNOSIS — F32.5 MAJOR DEPRESSIVE DISORDER WITH SINGLE EPISODE, IN FULL REMISSION: ICD-10-CM

## 2024-07-25 DIAGNOSIS — R27.8 COORDINATION ABNORMAL: ICD-10-CM

## 2024-07-25 DIAGNOSIS — G89.29 CHRONIC PELVIC PAIN IN FEMALE: Primary | ICD-10-CM

## 2024-07-25 DIAGNOSIS — R10.2 CHRONIC PELVIC PAIN IN FEMALE: Primary | ICD-10-CM

## 2024-07-25 LAB
25(OH)D3+25(OH)D2 SERPL-MCNC: 78 NG/ML (ref 30–96)
ALBUMIN SERPL BCP-MCNC: 4.2 G/DL (ref 3.5–5.2)
ALP SERPL-CCNC: 63 U/L (ref 55–135)
ALT SERPL W/O P-5'-P-CCNC: 12 U/L (ref 10–44)
ANION GAP SERPL CALC-SCNC: 10 MMOL/L (ref 8–16)
AST SERPL-CCNC: 14 U/L (ref 10–40)
BASOPHILS # BLD AUTO: 0.04 K/UL (ref 0–0.2)
BASOPHILS NFR BLD: 0.6 % (ref 0–1.9)
BILIRUB SERPL-MCNC: 0.5 MG/DL (ref 0.1–1)
BUN SERPL-MCNC: 10 MG/DL (ref 6–20)
CALCIUM SERPL-MCNC: 9.8 MG/DL (ref 8.7–10.5)
CHLORIDE SERPL-SCNC: 102 MMOL/L (ref 95–110)
CHOLEST SERPL-MCNC: 126 MG/DL (ref 120–199)
CHOLEST/HDLC SERPL: 2.5 {RATIO} (ref 2–5)
CO2 SERPL-SCNC: 26 MMOL/L (ref 23–29)
CREAT SERPL-MCNC: 0.9 MG/DL (ref 0.5–1.4)
DIFFERENTIAL METHOD BLD: NORMAL
EOSINOPHIL # BLD AUTO: 0.1 K/UL (ref 0–0.5)
EOSINOPHIL NFR BLD: 1 % (ref 0–8)
ERYTHROCYTE [DISTWIDTH] IN BLOOD BY AUTOMATED COUNT: 12.4 % (ref 11.5–14.5)
EST. GFR  (NO RACE VARIABLE): >60 ML/MIN/1.73 M^2
GLUCOSE SERPL-MCNC: 87 MG/DL (ref 70–110)
HCT VFR BLD AUTO: 42.5 % (ref 37–48.5)
HDLC SERPL-MCNC: 50 MG/DL (ref 40–75)
HDLC SERPL: 39.7 % (ref 20–50)
HGB BLD-MCNC: 14.7 G/DL (ref 12–16)
IMM GRANULOCYTES # BLD AUTO: 0.01 K/UL (ref 0–0.04)
IMM GRANULOCYTES NFR BLD AUTO: 0.1 % (ref 0–0.5)
LDLC SERPL CALC-MCNC: 59.4 MG/DL (ref 63–159)
LYMPHOCYTES # BLD AUTO: 2.4 K/UL (ref 1–4.8)
LYMPHOCYTES NFR BLD: 33.3 % (ref 18–48)
MCH RBC QN AUTO: 29.9 PG (ref 27–31)
MCHC RBC AUTO-ENTMCNC: 34.6 G/DL (ref 32–36)
MCV RBC AUTO: 86 FL (ref 82–98)
MONOCYTES # BLD AUTO: 0.5 K/UL (ref 0.3–1)
MONOCYTES NFR BLD: 6.7 % (ref 4–15)
NEUTROPHILS # BLD AUTO: 4.2 K/UL (ref 1.8–7.7)
NEUTROPHILS NFR BLD: 58.3 % (ref 38–73)
NONHDLC SERPL-MCNC: 76 MG/DL
NRBC BLD-RTO: 0 /100 WBC
PLATELET # BLD AUTO: 284 K/UL (ref 150–450)
PMV BLD AUTO: 9.9 FL (ref 9.2–12.9)
POTASSIUM SERPL-SCNC: 3.8 MMOL/L (ref 3.5–5.1)
PROT SERPL-MCNC: 7.7 G/DL (ref 6–8.4)
RBC # BLD AUTO: 4.92 M/UL (ref 4–5.4)
SODIUM SERPL-SCNC: 138 MMOL/L (ref 136–145)
TRIGL SERPL-MCNC: 83 MG/DL (ref 30–150)
WBC # BLD AUTO: 7.21 K/UL (ref 3.9–12.7)

## 2024-07-25 PROCEDURE — 82306 VITAMIN D 25 HYDROXY: CPT | Performed by: INTERNAL MEDICINE

## 2024-07-25 PROCEDURE — 80053 COMPREHEN METABOLIC PANEL: CPT | Performed by: INTERNAL MEDICINE

## 2024-07-25 PROCEDURE — 36415 COLL VENOUS BLD VENIPUNCTURE: CPT | Performed by: INTERNAL MEDICINE

## 2024-07-25 PROCEDURE — 97530 THERAPEUTIC ACTIVITIES: CPT | Mod: PN | Performed by: PHYSICAL THERAPIST

## 2024-07-25 PROCEDURE — 80061 LIPID PANEL: CPT | Performed by: INTERNAL MEDICINE

## 2024-07-25 PROCEDURE — 85025 COMPLETE CBC W/AUTO DIFF WBC: CPT | Performed by: INTERNAL MEDICINE

## 2024-07-25 PROCEDURE — 97112 NEUROMUSCULAR REEDUCATION: CPT | Mod: PN | Performed by: PHYSICAL THERAPIST

## 2024-07-25 NOTE — PATIENT INSTRUCTIONS
5 Kegels, 2xs a day, hold for 10 seconds   DO NOT increase pain!     Can also do quick Kegels - reps of 3 - 3-5 times     Continue to use the core and pelvic floor when standing from the toilet.     Use wand or dilator to assist with pain - before it gets bad.

## 2024-07-25 NOTE — PROGRESS NOTES
Pelvic Health Physical Therapy   Treatment Note and Discharge Summary     Name: Kathy Palomo [facility]  Clinic Number: [id number]    Therapy Diagnosis:   No diagnosis found.      Physician: Padmini Drummond,*    Visit Date: 7/25/2024    Physician Orders: PT Eval and Treat PF PT  Medical Diagnosis from Referral: R10.2,G89.29 (ICD-10-CM) - Chronic pelvic pain in female  Evaluation Date: 1/19/2024  Authorization Period Expiration: 1/10/2025  Plan of Care Expiration: 8/5/2024  Progress Note Due: 7/25/2024  Visit # 14/24 Visits authorized: 13/20  FOTO: 3/3    Cancelled Visits: 0  No Show Visits: 0    Time In: 3:25 PM   Time Out: 4:06 PM     Total Billable Time: 41 minutes    Precautions: Standard    Subjective     Pt reports: Doing Uribel for her bladder. Doing the same treatment. [place] was fine last time without a flare up. Sleep is ok. Waking up at 3am - with urge to pee. Doing estrogen still 2xs a week. Started weight loss meds. Having more frequent bowel movements and more volume. Doing dilators, not crazy about the wand. Wand helps when she is really. Running at times.     She was compliant with home exercise program.  Response to previous treatment: improved pain and pelvic floor awareness  Functional change: improved pelvic floor awareness    Pain in: 0-1/10 bladder pain   Pain out: 0/10 after pelvic floor manual therapy  Location: pelvic pain       Objective     Kathy participated in dynamic functional therapeutic activities to improve functional performance for 16 minutes, including:  Continue with pelvic wand use to assist with PF pain before it increases. Continue with vaginal dilator use.     Kathy participated in neuromuscular re-education activities to develop Coordination, Down training, and Proprioception for 25 minutes including: RUSI for breathing, drops, contractions of the PFM to help pt visualize PFM motion and function.  Did well with pelvic floor coordination and awareness.  Provided home  exercise program for slowly progressive Kegels without increasing pain to assist with post void dribble.       Intervention 03/28/2024 04/04/2024 04/11/2024 05/13/2024 06/06/2024 06/27/2024 7/25/2024   Neuro Re-Ed Rehabilitative ultrasound     RUSI for breathing, drops, contractions of the PFM to help pt visualize PFM motion and function. and rehabilitative ultrasound imaging to help visualize pelvic floor muscle contraction and relaxation with kegels  Perineal view and lower abdominal view. PVR WNL. Educated on PF down training and mobility to assist with bowel habits.     RUSI for breathing, drops, contractions of the PFM to help pt visualize PFM motion and function.  Did well with pelvic floor coordination and awareness.       RUSI for breathing, drops, contractions of the PFM to help pt visualize PFM motion and function.  Did well with pelvic floor coordination and awareness.  Provided home exercise program for slowly progressive Kegels without increasing pain to assist with post void dribble.    Manual Ther    Educated on double voiding PRN when she does not empty well. Use of wand with limited movement for PF mobility educated on IC diet and being aware of bladder irritants. trigger point release to the bilateral levator ani with cues for PF relaxation. Contract relax to the obturator internus.  trigger point release to the bilateral psoas, sidelying iliac depression, passive hip extension.   trigger point/myofascial release of bilateral bulbocavernosus, contract relax to the left obturator internus.         TherAct  Educated on double voiding PRN when she does not empty well. Use of wand with limited movement for PF mobility educated on IC diet and being aware of bladder irritants.    Educated on use of vaginal dilators vs pelvic wand on a regular basis to assist with maintaining pelvic floor mobility. Educated on use of TENs unit to assist with pelvic pain as well.  Educated on increased use of the  gluts with ambulation, regular mobility of the hip flexors.    Educated on gastro colic reflex and improved bowel habits.    Educated on continued management of pelvic pain with body awareness and medication PRN.    More wand use, Educated on Oh Nut for deep penetration pain  Continue with pelvic wand use to assist with PF pain before it increases. Continue with vaginal dilator use.          Home Exercises Provided and Patient Education Provided     Education provided:   - anatomy/physiology of pelvic floor, posture/body mechanices, diaphragmatic breathing, and double voiding techniques  Discussed progression of plan of care with patient; educated pt in activity modification; reviewed HEP with pt. Pt demonstrated and verbalized understanding of all instruction and was provided with a handout of HEP (see Patient Instructions).    Written Home Exercises Provided: yes.  Exercises were reviewed and Kathy was able to demonstrate them prior to the end of the session.  Kathy demonstrated good  understanding of the education provided.     See EMR under Patient Instructions for exercises provided 07/25/2024 .    Assessment     Pain has improved overall. Good carryover with physical therapist recommendations. Pain has been manageable.     Kathy Is progressing well towards her goals.   Pt prognosis is Excellent.     Pt will continue to benefit from skilled outpatient physical therapy to address the deficits listed in the problem list box on initial evaluation, provide pt/family education and to maximize pt's level of independence in the home and community environment.     Pt's spiritual, cultural and educational needs considered and pt agreeable to plan of care and goals.     Anticipated barriers to physical therapy: none    Short Term Goals: 6 weeks   Patient will be independent with pelvic floor relaxation to improve bowel and bladder evacuation. Goal met - 06/27/2024   Patient will be able to demonstrate improved pelvic  floor relaxation and contraction on rehabilitative ultrasound. Goal met - 05/13/2024   Patient will report improved water intake. Goal met 02/22/2024      Long Term Goals: 12 weeks   Patient will report urinating every 3-4 hours with strong urine stream. Goal met - 06/27/2024   Patient will deny pelvic pain with vaginal penetration. Goal met - 06/27/2024   Patient will deny pelvic pain at rest to allow for full tolerance for activities of choice. Goal met - 05/13/2024   Patient will demonstrate adequate pelvic floor coordination with contraction and relaxation on sEMG vs rehabilitative ultrasound.  Goal met - 05/13/2024      New goals:   Patient will report improved urinary incontinence.     Plan     Still with UI after urinating. Patient is agreeable to discharge from physical therapy at this time.     Keven Kirby, PT

## 2024-07-31 ENCOUNTER — OFFICE VISIT (OUTPATIENT)
Dept: INTERNAL MEDICINE | Facility: CLINIC | Age: 29
End: 2024-07-31
Payer: COMMERCIAL

## 2024-07-31 VITALS
BODY MASS INDEX: 37.08 KG/M2 | HEART RATE: 70 BPM | DIASTOLIC BLOOD PRESSURE: 84 MMHG | WEIGHT: 244.69 LBS | SYSTOLIC BLOOD PRESSURE: 102 MMHG | RESPIRATION RATE: 16 BRPM | OXYGEN SATURATION: 98 % | HEIGHT: 68 IN

## 2024-07-31 DIAGNOSIS — F32.5 MAJOR DEPRESSIVE DISORDER WITH SINGLE EPISODE, IN FULL REMISSION: ICD-10-CM

## 2024-07-31 DIAGNOSIS — F50.81 BINGE EATING DISORDER: ICD-10-CM

## 2024-07-31 DIAGNOSIS — E66.09 CLASS 2 OBESITY DUE TO EXCESS CALORIES WITHOUT SERIOUS COMORBIDITY WITH BODY MASS INDEX (BMI) OF 37.0 TO 37.9 IN ADULT: Primary | ICD-10-CM

## 2024-07-31 DIAGNOSIS — K59.09 CHRONIC CONSTIPATION: ICD-10-CM

## 2024-07-31 PROCEDURE — 3008F BODY MASS INDEX DOCD: CPT | Mod: CPTII,S$GLB,, | Performed by: INTERNAL MEDICINE

## 2024-07-31 PROCEDURE — 99214 OFFICE O/P EST MOD 30 MIN: CPT | Mod: S$GLB,,, | Performed by: INTERNAL MEDICINE

## 2024-07-31 PROCEDURE — 99999 PR PBB SHADOW E&M-EST. PATIENT-LVL V: CPT | Mod: PBBFAC,,, | Performed by: INTERNAL MEDICINE

## 2024-07-31 PROCEDURE — 1159F MED LIST DOCD IN RCRD: CPT | Mod: CPTII,S$GLB,, | Performed by: INTERNAL MEDICINE

## 2024-07-31 PROCEDURE — 1160F RVW MEDS BY RX/DR IN RCRD: CPT | Mod: CPTII,S$GLB,, | Performed by: INTERNAL MEDICINE

## 2024-07-31 PROCEDURE — 3074F SYST BP LT 130 MM HG: CPT | Mod: CPTII,S$GLB,, | Performed by: INTERNAL MEDICINE

## 2024-07-31 PROCEDURE — 3079F DIAST BP 80-89 MM HG: CPT | Mod: CPTII,S$GLB,, | Performed by: INTERNAL MEDICINE

## 2024-07-31 PROCEDURE — G2211 COMPLEX E/M VISIT ADD ON: HCPCS | Mod: S$GLB,,, | Performed by: INTERNAL MEDICINE

## 2024-07-31 NOTE — PROGRESS NOTES
Subjective:       Patient ID: Kathy Beltrán is a 29 y.o. female.    Chief Complaint: Follow-up      HPI:  Patient is known to me and presents for follow up weight management. She has obesity, MDD and irritable bowel with chronic constipation. Labs from 7/25/24 personally reviewed, interperted and discussed today.     A1C 4.8%, normal  LDL 59, normal  GFR > 60, normal  TSH 1.5, normal  Vit D 78, normal     Obesity: On Vyvanse for binge-eating disorder. She is doing well on current dose.   Since last visit she was able to establish on [facility] weigh tloss program and started wegovy. Just titrated up to 0.5mg dosing. Tolerating well thus far. Not much weigh tloss so feeling frustrated.   She was on Mounjaro in the past but had to stop due to finances.         Feb 2023 started on protonix. Saw GI and sx are improved and has weaned off since last visit.      Depresison: on lamictal and wellbutrin. Working well. Follows with psychiatry.      Irritable bowel with constipation: on linzess. Working ok.  Previously BM every 7-10 day  Now BM twice a week     PAP-establishedwith Dr. Kelly     Tobacco use: denies use  EtOH use: juaquin    Past Medical History:   Diagnosis Date    Hydradenitis        Family History   Problem Relation Name Age of Onset    Hypertension Maternal Grandmother Katelynn     Heart disease Maternal Grandmother Katelynn     Thyroid disease Maternal Grandmother Katelynn     Hypertension Maternal Grandfather Wilber     Heart disease Maternal Grandfather Wilber     Hypertension Father Rafael     Heart disease Father Rafael     Hypertension Mother Ajit     Heart disease Mother Ajit     Asthma Mother Ajit     Asthma Sister Jennyfer     Breast cancer Neg Hx         Social History     Socioeconomic History    Marital status:    Tobacco Use    Smoking status: Never     Passive exposure: Never    Smokeless tobacco: Never   Substance and Sexual Activity    Alcohol use: Yes     Comment: Social drinker    Drug use:  Never    Sexual activity: Yes     Partners: Male     Birth control/protection: I.U.D.     Comment: Dennis     Social Determinants of Health     Financial Resource Strain: Patient Declined (11/27/2023)    Overall Financial Resource Strain (CARDIA)     Difficulty of Paying Living Expenses: Patient declined   Food Insecurity: Patient Declined (11/27/2023)    Hunger Vital Sign     Worried About Running Out of Food in the Last Year: Patient declined     Ran Out of Food in the Last Year: Patient declined   Transportation Needs: No Transportation Needs (11/27/2023)    PRAPARE - Transportation     Lack of Transportation (Medical): No     Lack of Transportation (Non-Medical): No   Physical Activity: Unknown (11/27/2023)    Exercise Vital Sign     Days of Exercise per Week: Patient declined   Stress: Patient Declined (11/27/2023)    [employer] - Occupational Stress Questionnaire     Feeling of Stress : Patient declined   Housing Stability: Low Risk  (11/27/2023)    Housing Stability Vital Sign     Unable to Pay for Housing in the Last Year: No     Number of Places Lived in the Last Year: 1     Unstable Housing in the Last Year: No       Review of Systems   Constitutional:  Negative for activity change, fatigue, fever and unexpected weight change.   HENT:  Negative for congestion, ear pain, hearing loss, rhinorrhea and sore throat.    Eyes:  Negative for redness and visual disturbance.   Respiratory:  Negative for cough, shortness of breath and wheezing.    Cardiovascular:  Positive for leg swelling. Negative for chest pain and palpitations.   Gastrointestinal:  Negative for abdominal pain, constipation, diarrhea, nausea and vomiting.   Genitourinary:  Negative for dysuria, frequency and urgency.   Musculoskeletal:  Negative for back pain, joint swelling and neck pain.   Skin:  Negative for color change, rash and wound.   Neurological:  Negative for dizziness, light-headedness and headaches.          Objective:      Physical Exam  Vitals reviewed.   Constitutional:       General: She is not in acute distress.     Appearance: She is well-developed. She is obese.   HENT:      Head: Normocephalic and atraumatic.      Right Ear: External ear normal.      Left Ear: External ear normal.      Nose: Nose normal.   Eyes:      General:         Right eye: No discharge.         Left eye: No discharge.      Conjunctiva/sclera: Conjunctivae normal.   Neck:      Thyroid: No thyromegaly.   Cardiovascular:      Rate and Rhythm: Normal rate and regular rhythm.   Pulmonary:      Effort: Pulmonary effort is normal. No respiratory distress.   Skin:     General: Skin is warm and dry.   Neurological:      Mental Status: She is alert and oriented to person, place, and time.   Psychiatric:         Behavior: Behavior normal.         Thought Content: Thought content normal.         Assessment:       1. Class 2 obesity due to excess calories without serious comorbidity with body mass index (BMI) of 37.0 to 37.9 in adult    2. Binge eating disorder    3. Major depressive disorder with single episode, in full remission    4. Chronic constipation        Plan:       1. Class 2 obesity due to excess calories without serious comorbidity with body mass index (BMI) of 37.0 to 37.9 in adult  Chronic stable  On GLP-1 and titrating up to effective dose  She is feeling a bit frustrated as not seeing much weigh tloss but offered reassurance. We start seeing more significant weigh tloss at 1mg dose so continue with plan  Cont vyvanse for now--may wean off pending response to wegovy  Diet, exercise    2. Binge eating disorder  Chronic stable  Cont vyvanse for now  We may wean off pending response to wegovy   reviewed, no discrepancies    3. Major depressive disorder with single episode, in full remission  Chronic stable  Cont meds same dose    4. Chronic constipation  Chronic stable  Cont meds same dose       RTC 6 months and PRN

## 2024-08-01 NOTE — TELEPHONE ENCOUNTER
No care due was identified.  [facility] Embedded Care Due Messages. Reference number: [id number].   7/31/2024 8:29:40 PM CDT

## 2024-08-02 RX ORDER — LISDEXAMFETAMINE DIMESYLATE 30 MG/1
30 CAPSULE ORAL EVERY MORNING
Qty: 30 CAPSULE | Refills: 0 | Status: SHIPPED | OUTPATIENT
Start: 2024-08-02

## 2024-08-06 ENCOUNTER — TELEPHONE (OUTPATIENT)
Dept: OBSTETRICS AND GYNECOLOGY | Facility: CLINIC | Age: 29
End: 2024-08-06
Payer: COMMERCIAL

## 2024-08-08 ENCOUNTER — PROCEDURE VISIT (OUTPATIENT)
Dept: OBSTETRICS AND GYNECOLOGY | Facility: CLINIC | Age: 29
End: 2024-08-08
Payer: COMMERCIAL

## 2024-08-08 VITALS
BODY MASS INDEX: 36.64 KG/M2 | OXYGEN SATURATION: 98 % | WEIGHT: 241.75 LBS | RESPIRATION RATE: 16 BRPM | SYSTOLIC BLOOD PRESSURE: 130 MMHG | DIASTOLIC BLOOD PRESSURE: 82 MMHG | HEIGHT: 68 IN | HEART RATE: 90 BPM

## 2024-08-08 DIAGNOSIS — Z30.432 ENCOUNTER FOR IUD REMOVAL: Primary | ICD-10-CM

## 2024-08-08 PROCEDURE — 58301 REMOVE INTRAUTERINE DEVICE: CPT | Mod: S$GLB,,, | Performed by: OBSTETRICS & GYNECOLOGY

## 2024-08-15 ENCOUNTER — PATIENT MESSAGE (OUTPATIENT)
Dept: INTERNAL MEDICINE | Facility: CLINIC | Age: 29
End: 2024-08-15
Payer: COMMERCIAL

## 2024-08-22 ENCOUNTER — OFFICE VISIT (OUTPATIENT)
Dept: NEUROLOGY | Facility: CLINIC | Age: 29
End: 2024-08-22
Payer: COMMERCIAL

## 2024-08-22 VITALS
SYSTOLIC BLOOD PRESSURE: 132 MMHG | DIASTOLIC BLOOD PRESSURE: 94 MMHG | HEART RATE: 94 BPM | HEIGHT: 68 IN | WEIGHT: 243.81 LBS | BODY MASS INDEX: 36.95 KG/M2 | RESPIRATION RATE: 14 BRPM

## 2024-08-22 DIAGNOSIS — G43.709 CHRONIC MIGRAINE WITHOUT AURA WITHOUT STATUS MIGRAINOSUS, NOT INTRACTABLE: Primary | ICD-10-CM

## 2024-08-22 PROBLEM — G43.711 CHRONIC MIGRAINE WITHOUT AURA, WITH INTRACTABLE MIGRAINE, SO STATED, WITH STATUS MIGRAINOSUS: Status: ACTIVE | Noted: 2024-08-22

## 2024-08-22 PROCEDURE — 99204 OFFICE O/P NEW MOD 45 MIN: CPT | Mod: S$GLB,,, | Performed by: STUDENT IN AN ORGANIZED HEALTH CARE EDUCATION/TRAINING PROGRAM

## 2024-08-22 PROCEDURE — 1159F MED LIST DOCD IN RCRD: CPT | Mod: CPTII,S$GLB,, | Performed by: STUDENT IN AN ORGANIZED HEALTH CARE EDUCATION/TRAINING PROGRAM

## 2024-08-22 PROCEDURE — 3080F DIAST BP >= 90 MM HG: CPT | Mod: CPTII,S$GLB,, | Performed by: STUDENT IN AN ORGANIZED HEALTH CARE EDUCATION/TRAINING PROGRAM

## 2024-08-22 PROCEDURE — 3008F BODY MASS INDEX DOCD: CPT | Mod: CPTII,S$GLB,, | Performed by: STUDENT IN AN ORGANIZED HEALTH CARE EDUCATION/TRAINING PROGRAM

## 2024-08-22 PROCEDURE — G2211 COMPLEX E/M VISIT ADD ON: HCPCS | Mod: S$GLB,,, | Performed by: STUDENT IN AN ORGANIZED HEALTH CARE EDUCATION/TRAINING PROGRAM

## 2024-08-22 PROCEDURE — 99999 PR PBB SHADOW E&M-EST. PATIENT-LVL IV: CPT | Mod: PBBFAC,,, | Performed by: STUDENT IN AN ORGANIZED HEALTH CARE EDUCATION/TRAINING PROGRAM

## 2024-08-22 PROCEDURE — 3075F SYST BP GE 130 - 139MM HG: CPT | Mod: CPTII,S$GLB,, | Performed by: STUDENT IN AN ORGANIZED HEALTH CARE EDUCATION/TRAINING PROGRAM

## 2024-08-22 RX ORDER — RIZATRIPTAN BENZOATE 10 MG/1
10 TABLET ORAL
Qty: 9 TABLET | Refills: 11 | Status: SHIPPED | OUTPATIENT
Start: 2024-08-22 | End: 2024-09-21

## 2024-08-22 NOTE — PROGRESS NOTES
Name: Kathy Beltrán  MRN: [id number]   CSN: [id number]      Date: 2024    Referring physician:  Self     Chief Complaint / Interval History: Neurologic Problem (Migraines and passing out)      History of Present Illness (HPI):  Ms. Beltrán is a 30 yo woman who presents for evaluation of migraines that have recently worsened. She states that she has always had headaches. At age 12-13 she was started on some injection for migraines and was prescribed Fiorcet. Her headaches had settled down but in the last few months they have worsened significantly. They are side locked on the right side of her head (temporal). Its throbbing sensation. No aura. They will last all day. Sometimes they will respond to Excedrin. 8/10 in pain. No N/V. Some light sensitivity. Currently having these a couple of times/month.  Maybe some whooshing in the ears that is new. She also feels this is worse when she bends over.   In the last year she has begun passing out occasionally. Occurs mostly when she stands up quickly. Some heart palpitations. Sounds orthostatic.   No recent weight gain.   Doesn't drink caffeine regularly.     Current Headache Medications:  Lamictal     Prior Headache Medication Trials:    Headache ROS:  OTC Medication Use: none   Caffeine: not daily   Sleep/WOODROW: no snoring, sometimes wakes up with the headaches   Hydration: gallon water/day  Exercise: had been running every other day and felt that this triggered her headaches   Stress:OK   Eye Exam: a couple of months ago - unsure if dilated     Past Medical History: The patient  has a past medical history of Constipation, Hydradenitis, and Mental disorder.    Relevant Surgical History:   Past Surgical History:   Procedure Laterality Date    ADENOIDECTOMY      BREAST BIOPSY       SECTION          CHOLECYSTECTOMY      CHOLESTEATOMA EXCISION      cystoscope  2024    DIAGNOSTIC LAPAROSCOPY N/A 2023    Procedure: LAPAROSCOPY, DIAGNOSTIC;  Surgeon:  Padmini Drummond MD;  Location: [facility];  Service: OB/GYN;  Laterality: N/A;    INTRAUTERINE DEVICE INSERTION N/A 12/26/2023    Procedure: INSERTION, INTRAUTERINE DEVICE (KYLEENA);  Surgeon: Padmini Drummond MD;  Location: [facility];  Service: OB/GYN;  Laterality: N/A;    OPEN REDUCTION AND INTERNAL FIXATION (ORIF) OF INJURY OF ELBOW Left 2006    ?    REMOVAL OF INTRAUTERINE DEVICE (IUD) N/A 12/26/2023    Procedure: REMOVAL, INTRAUTERINE DEVICE;  Surgeon: Padmini Drummond MD;  Location: [facility];  Service: OB/GYN;  Laterality: N/A;    TONSILLECTOMY         Social History: The patient  reports that she has never smoked. She has never been exposed to tobacco smoke. She has never used smokeless tobacco. She reports current alcohol use. She reports that she does not use drugs.    Family History: Their family history includes Asthma in her mother and sister; Heart disease in her father, maternal grandfather, maternal grandmother, and mother; Hypertension in her father, maternal grandfather, maternal grandmother, and mother; Thyroid disease in her maternal grandmother. Mom and maternal grandmother as well as her sister has migraines.     Allergies: Pcn [penicillins] and Sulfa (sulfonamide antibiotics)     Meds:   Current Outpatient Medications on File Prior to Visit   Medication Sig Dispense Refill    clindamycin (CLEOCIN T) 1 % external solution Apply topically as needed (hydradenitis).      conjugated estrogens (PREMARIN) vaginal cream 1 g with applicator or dime-sized amt with finger in vagina nightly x 2 weeks, then twice a week thereafter 45 g 12    doxycycline (VIBRAMYCIN) 100 MG Cap Take 100 mg by mouth once daily.      lactulose (CHRONULAC) 10 gram/15 mL solution Take 30 mLs (20 g total) by mouth 2 (two) times daily as needed (as needed for consipation , if no BM in 2-3 days). 240 mL 0    lamoTRIgine (LAMICTAL) 200 MG tablet Take 200 mg by mouth once daily.      linaCLOtide (LINZESS) 145 mcg Cap  "capsule Take 1 capsule (145 mcg total) by mouth daily 30 minutes before food in AM 90 capsule 0    lisdexamfetamine (VYVANSE) 30 MG capsule Take 1 capsule (30 mg total) by mouth every morning. 30 capsule 0    methen-m.blue-s.phos-phsal-hyo (URIBEL) 118-10-40.8-36 mg Cap Take 1 capsule by mouth 4 (four) times daily. 20 capsule 11    mirabegron (MYRBETRIQ) 50 mg Tb24 Take 1 tablet (50 mg total) by mouth daily. 30 tablet 11    semaglutide, weight loss, (WEGOVY) 0.5 mg/0.5 mL PnIj Inject 0.5 mg into the skin every 7 days. 2 mL 0    spironolactone (ALDACTONE) 100 MG tablet Take 150 mg by mouth once daily.      LIDOCAINE 2 %, VALIUM 5 MG, BACLOFEN 4 % SUPPOSITORY Place 1 suppository vaginally 2 (two) times daily. (Patient not taking: Reported on 8/22/2024) 12 each 0    [START ON 8/27/2024] semaglutide, weight loss, (WEGOVY) 1 mg/0.5 mL PnIj Inject 1 mg into the skin every 7 days. (Patient not taking: Reported on 8/22/2024) 2 mL 0    [DISCONTINUED] buPROPion (WELLBUTRIN XL) 150 MG TB24 tablet Take 1 tablet by mouth once daily 90 tablet 1    [DISCONTINUED] levonorgestreL (KYLEENA) 17.5 mcg/24 hr (5 yrs) 19.5 mg IUD 1 each by Intrauterine route once.      [DISCONTINUED] norethindrone (AYGESTIN) 5 mg Tab Take 1 tablet (5 mg total) by mouth once daily. 30 tablet 11    [DISCONTINUED] semaglutide, weight loss, (WEGOVY) 0.25 mg/0.5 mL PnIj Inject 0.25 mg into the skin every 7 days. (Patient not taking: Reported on 7/31/2024) 2 mL 0    [DISCONTINUED] vilazodone (VIIBRYD) 20 mg Tab Take 1 tablet (20 mg total) by mouth once daily with food. 30 tablet 0     No current facility-administered medications on file prior to visit.       Exam:  BP (!) 132/94 (BP Location: Left arm, Patient Position: Sitting, BP Method: Medium (Manual))   Pulse 94   Resp 14   Ht 5' 8" (1.727 m)   Wt 110.6 kg (243 lb 13.3 oz)   LMP  (LMP Unknown)   BMI 37.07 kg/m²     Constitutional  Well-developed, well-nourished, appears stated age   Cardiovascular  " No LE edema bilaterally   Neurological    * Mental status  MOCA = not done during today's visit     - Orientation  Oriented to conversation     - Memory   Intact recent and remote     - Attention/concentration  Attentive, vigilant during exam     - Language  Intact to conversation.     - Fund of knowledge  Aware of current events     - Executive  Well-organized thoughts     - Other     * Cranial nerves       - CN II  Pupils equal, visual fields full to confrontation     - CN III, IV, VI  Extraocular movements full, normal pursuits and saccades         - CN VII  Face strong and symmetric bilaterally     - CN VIII  Hearing intact bilaterally     - CN IX, X  Palate raises midline and symmetric     - CN XI  SCM and trapezius 5/5 bilaterally     - CN XII  Tongue midline   * Motor  Muscle bulk normal, strength 5/5 throughout   * Sensory   Intact to light touch throughout   * Coordination  No dysmetria with finger-to-nose    * Gait  Normal gait.   * Deep tendon reflexes  2+ and symmetric throughout   Babinski downgoing bilaterally     Medical Record Review:  Labs, imaging and prior notes reviewed independently.     Diagnoses:          1. Chronic migraine without aura, with intractable migraine, so stated, with status migrainosus  MRI Brain Without Contrast    rizatriptan (MAXALT) 10 MG tablet          Assessment:  Ms. Beltrán is a 30 yo woman with migraines who has been experiencing more frequent and severe migraines as of the last month or so. She describes unilateral, throbbing pain that is associated with sensitivity to light and worsened with bending over. She has also had a few syncopal episodes which sound orthostatic. She describes so tinnitus. No visual changes. No recent weight gain. She was started on Doxycyline in Nov. We agreed to the following:     Plan:  - MRI brain  - Dilated eye exam to r/o papilledema  - Maxalt PRN for severe headaches. Should she be interested in ppx treatment we would use TPM  - Discussed  the use of compression for her dizziness upon standing. She stays very hydrated. Wondering if there is a component of dysautonomia.     RTC in 4-6 months to see me virtually.     Georgina Aguilar MD  Division of Movement and Memory Disorders  [facility]  [phone]

## 2024-08-22 NOTE — PATIENT INSTRUCTIONS
1) Call your eye doctor to see if you've ever had an exam to document if there was any swelling of the optic nerve (papilledema).

## 2024-08-30 ENCOUNTER — HOSPITAL ENCOUNTER (OUTPATIENT)
Dept: RADIOLOGY | Facility: HOSPITAL | Age: 29
Discharge: HOME | End: 2024-08-30
Attending: STUDENT IN AN ORGANIZED HEALTH CARE EDUCATION/TRAINING PROGRAM
Payer: COMMERCIAL

## 2024-08-30 DIAGNOSIS — G43.709 CHRONIC MIGRAINE WITHOUT AURA WITHOUT STATUS MIGRAINOSUS, NOT INTRACTABLE: ICD-10-CM

## 2024-08-30 PROCEDURE — 70551 MRI BRAIN STEM W/O DYE: CPT | Mod: 26,,, | Performed by: RADIOLOGY

## 2024-08-30 PROCEDURE — 70551 MRI BRAIN STEM W/O DYE: CPT | Mod: TC

## 2024-09-02 DIAGNOSIS — F50.81 BINGE EATING DISORDER: ICD-10-CM

## 2024-09-02 NOTE — TELEPHONE ENCOUNTER
No care due was identified.  Health [facility] Embedded Care Due Messages. Reference number: [id number].   9/02/2024 10:15:49 AM CDT

## 2024-09-03 RX ORDER — LISDEXAMFETAMINE DIMESYLATE 30 MG/1
30 CAPSULE ORAL EVERY MORNING
Qty: 30 CAPSULE | Refills: 0 | Status: SHIPPED | OUTPATIENT
Start: 2024-09-03

## 2024-09-05 NOTE — PROGRESS NOTES
Patient ID: Kathy Beltrán is a 29 y.o. White female    Subjective  Chief Complaint: patient presents for medical weight loss management.    Co-morbidities: none    HPI: Patient [facility] in June 2024 and is currently managed on Wegovy 1 mg. Hx of BED noted in chart. Pt reviewed by Dr. Sabina Barton with approval for therapy. Pt has just started the 1 mg strength.    Tolerance to current therapy:  Denies vomiting, diarrhea, abdominal pain  Endorses nausea, constipation both are manageable    Weight loss history:  Starting weight:    6/17/2024   Recent Readings    Weight (lbs) 243 lb    BMI 36.94 BMI    Current weight: pt reported 236 lbs (luis m not transmitting data)  % weight loss since GLP-1 initiation: 2.9%    Objective  Lab Results   Component Value Date     07/25/2024     11/24/2023     06/23/2023     Lab Results   Component Value Date    K 3.8 07/25/2024    K 4.1 11/24/2023    K 4.5 06/23/2023     Lab Results   Component Value Date     07/25/2024     11/24/2023     06/23/2023     Lab Results   Component Value Date    CO2 26 07/25/2024    CO2 27 11/24/2023    CO2 23 06/23/2023     Lab Results   Component Value Date    BUN 10 07/25/2024    BUN 10 11/24/2023    BUN 12 06/23/2023     Lab Results   Component Value Date    GLU 87 07/25/2024    GLU 80 11/24/2023    GLU 94 06/23/2023     Lab Results   Component Value Date    CALCIUM 9.8 07/25/2024    CALCIUM 9.4 11/24/2023    CALCIUM 9.7 06/23/2023     Lab Results   Component Value Date    PROT 7.7 07/25/2024    PROT 7.6 11/24/2023    PROT 7.8 06/23/2023     Lab Results   Component Value Date    ALBUMIN 4.2 07/25/2024    ALBUMIN 4.0 11/24/2023    ALBUMIN 4.0 06/23/2023     Lab Results   Component Value Date    BILITOT 0.5 07/25/2024    BILITOT 0.3 11/24/2023    BILITOT 0.4 06/23/2023     Lab Results   Component Value Date    AST 14 07/25/2024    AST 13 11/24/2023    AST 16 06/23/2023     Lab  Results   Component Value Date    ALT 12 07/25/2024    ALT 12 11/24/2023    ALT 12 06/23/2023     Lab Results   Component Value Date    ANIONGAP 10 07/25/2024    ANIONGAP 7 (L) 11/24/2023    ANIONGAP 11 06/23/2023     Lab Results   Component Value Date    CREATININE 0.9 07/25/2024    CREATININE 0.8 11/24/2023    CREATININE 0.8 06/23/2023     Lab Results   Component Value Date    EGFRNORACEVR >60 07/25/2024    EGFRNORACEVR >60 11/24/2023    EGFRNORACEVR >60 06/23/2023     Assessment/Plan  - Increase to Wegovy 1.7 mg x 4 weeks once completed with Wegovy 1 mg  - Then increase to Wegovy 2.4 mg SQ weekly  - RTC in 3 months for follow-up evaluation    Patient consented to pharmacist management via collaborative practice.

## 2024-09-06 ENCOUNTER — PATIENT MESSAGE (OUTPATIENT)
Dept: INTERNAL MEDICINE | Facility: CLINIC | Age: 29
End: 2024-09-06

## 2024-09-06 ENCOUNTER — OFFICE VISIT (OUTPATIENT)
Dept: INTERNAL MEDICINE | Facility: CLINIC | Age: 29
End: 2024-09-06
Payer: COMMERCIAL

## 2024-09-06 DIAGNOSIS — E66.9 OBESITY, UNSPECIFIED CLASSIFICATION, UNSPECIFIED OBESITY TYPE, UNSPECIFIED WHETHER SERIOUS COMORBIDITY PRESENT: Primary | ICD-10-CM

## 2024-09-06 RX ORDER — SEMAGLUTIDE 1.7 MG/.75ML
1.7 INJECTION, SOLUTION SUBCUTANEOUS
Qty: 3 ML | Refills: 0 | Status: ACTIVE | OUTPATIENT
Start: 2024-09-20

## 2024-09-06 RX ORDER — SEMAGLUTIDE 2.4 MG/.75ML
2.4 INJECTION, SOLUTION SUBCUTANEOUS
Qty: 3 ML | Refills: 1 | Status: ACTIVE | OUTPATIENT
Start: 2024-10-18

## 2024-09-10 DIAGNOSIS — F32.5 MAJOR DEPRESSIVE DISORDER WITH SINGLE EPISODE, IN FULL REMISSION: ICD-10-CM

## 2024-09-10 DIAGNOSIS — E66.09 CLASS 2 OBESITY DUE TO EXCESS CALORIES WITHOUT SERIOUS COMORBIDITY WITH BODY MASS INDEX (BMI) OF 39.0 TO 39.9 IN ADULT: ICD-10-CM

## 2024-09-10 RX ORDER — BUPROPION HYDROCHLORIDE 150 MG/1
150 TABLET ORAL
Qty: 30 TABLET | Refills: 0 | OUTPATIENT
Start: 2024-09-10

## 2024-09-10 NOTE — TELEPHONE ENCOUNTER
No care due was identified.  [facility] Embedded Care Due Messages. Reference number: [id number].   9/10/2024 4:17:35 PM CDT

## 2024-09-10 NOTE — TELEPHONE ENCOUNTER
Refill Decision Note   Kathy Beltrán  is requesting a refill authorization.  Brief Assessment and Rationale for Refill:  Quick Discontinue     Medication Therapy Plan:  Discontinued8/22/24 by Juli Fields MA.Reason: Patient no longer taking      Comments:     Note composed:5:18 PM 09/10/2024

## 2024-09-19 ENCOUNTER — PATIENT MESSAGE (OUTPATIENT)
Dept: NEUROLOGY | Facility: CLINIC | Age: 29
End: 2024-09-19
Payer: COMMERCIAL

## 2024-09-19 DIAGNOSIS — G43.709 CHRONIC MIGRAINE WITHOUT AURA WITHOUT STATUS MIGRAINOSUS, NOT INTRACTABLE: Primary | ICD-10-CM

## 2024-09-20 RX ORDER — AMITRIPTYLINE HYDROCHLORIDE 25 MG/1
25 TABLET, FILM COATED ORAL NIGHTLY
Qty: 30 TABLET | Refills: 11 | Status: SHIPPED | OUTPATIENT
Start: 2024-09-20 | End: 2025-09-20

## 2024-09-30 DIAGNOSIS — K59.09 CHRONIC CONSTIPATION: ICD-10-CM

## 2024-09-30 DIAGNOSIS — K58.1 IRRITABLE BOWEL SYNDROME WITH CONSTIPATION: ICD-10-CM

## 2024-09-30 DIAGNOSIS — F50.819 BINGE EATING DISORDER: ICD-10-CM

## 2024-10-01 NOTE — TELEPHONE ENCOUNTER
No care due was identified.  [facility] Embedded Care Due Messages. Reference number: [id number].   9/30/2024 8:48:18 PM CDT

## 2024-10-04 RX ORDER — LISDEXAMFETAMINE DIMESYLATE 30 MG/1
30 CAPSULE ORAL EVERY MORNING
Qty: 30 CAPSULE | Refills: 0 | Status: SHIPPED | OUTPATIENT
Start: 2024-10-04

## 2024-10-11 ENCOUNTER — HOSPITAL ENCOUNTER (OUTPATIENT)
Dept: SLEEP MEDICINE | Facility: HOSPITAL | Age: 29
Discharge: HOME | End: 2024-10-11
Payer: COMMERCIAL

## 2024-10-11 DIAGNOSIS — G47.30 SLEEP APNEA, UNSPECIFIED TYPE: ICD-10-CM

## 2024-10-11 DIAGNOSIS — G47.10 HYPERSOMNIA: ICD-10-CM

## 2024-10-11 DIAGNOSIS — G43.709 CHRONIC MIGRAINE WITHOUT AURA WITHOUT STATUS MIGRAINOSUS, NOT INTRACTABLE: ICD-10-CM

## 2024-10-22 DIAGNOSIS — G47.30 SLEEP APNEA, UNSPECIFIED: Primary | ICD-10-CM

## 2024-11-03 DIAGNOSIS — F50.819 BINGE EATING DISORDER: ICD-10-CM

## 2024-11-04 RX ORDER — LISDEXAMFETAMINE DIMESYLATE 30 MG/1
30 CAPSULE ORAL EVERY MORNING
Qty: 30 CAPSULE | Refills: 0 | Status: SHIPPED | OUTPATIENT
Start: 2024-11-04

## 2024-11-04 NOTE — TELEPHONE ENCOUNTER
No care due was identified.  [facility] Embedded Care Due Messages. Reference number: [id number].   11/03/2024 6:51:15 PM CST

## 2024-11-06 ENCOUNTER — PATIENT MESSAGE (OUTPATIENT)
Dept: OBSTETRICS AND GYNECOLOGY | Facility: CLINIC | Age: 29
End: 2024-11-06
Payer: COMMERCIAL

## 2024-12-02 ENCOUNTER — OFFICE VISIT (OUTPATIENT)
Dept: INTERNAL MEDICINE | Facility: CLINIC | Age: 29
End: 2024-12-02
Payer: COMMERCIAL

## 2024-12-02 ENCOUNTER — PATIENT MESSAGE (OUTPATIENT)
Dept: INTERNAL MEDICINE | Facility: CLINIC | Age: 29
End: 2024-12-02

## 2024-12-02 DIAGNOSIS — E66.812 OBESITY, CLASS II, BMI 35-39.9: Primary | ICD-10-CM

## 2024-12-02 PROCEDURE — 99499 UNLISTED E&M SERVICE: CPT | Mod: 95,,,

## 2024-12-02 RX ORDER — SEMAGLUTIDE 2.4 MG/.75ML
2.4 INJECTION, SOLUTION SUBCUTANEOUS
Qty: 3 ML | Refills: 3 | Status: ACTIVE | OUTPATIENT
Start: 2024-12-02

## 2024-12-02 NOTE — PROGRESS NOTES
Patient ID: Kathy Beltrán is a 29 y.o. White female    Subjective  Chief Complaint: patient presents for medical weight loss management.    Co-morbidities: none    HPI: Patient started [facility] with [facility] in June 2024 and is currently managed on Wegovy 2.4 mg. Hx of BED noted in chart. Pt reviewed by Dr. Sabina Barton with approval for therapy. Pt has completed 1 month on this strength.    Tolerance to current therapy:  Denies nausea, vomiting, diarrhea, constipation, abdominal pain    Weight loss history:  Starting weight:    6/17/2024   Recent Readings    Weight (lbs) 243 lb    BMI 36.94 BMI    Current weight: pt reported 233 lbs   % weight loss since GLP-1 initiation: 4.1 %    Objective  Lab Results   Component Value Date     07/25/2024     11/24/2023     06/23/2023     Lab Results   Component Value Date    K 3.8 07/25/2024    K 4.1 11/24/2023    K 4.5 06/23/2023     Lab Results   Component Value Date     07/25/2024     11/24/2023     06/23/2023     Lab Results   Component Value Date    CO2 26 07/25/2024    CO2 27 11/24/2023    CO2 23 06/23/2023     Lab Results   Component Value Date    BUN 10 07/25/2024    BUN 10 11/24/2023    BUN 12 06/23/2023     Lab Results   Component Value Date    GLU 87 07/25/2024    GLU 80 11/24/2023    GLU 94 06/23/2023     Lab Results   Component Value Date    CALCIUM 9.8 07/25/2024    CALCIUM 9.4 11/24/2023    CALCIUM 9.7 06/23/2023     Lab Results   Component Value Date    PROT 7.7 07/25/2024    PROT 7.6 11/24/2023    PROT 7.8 06/23/2023     Lab Results   Component Value Date    ALBUMIN 4.2 07/25/2024    ALBUMIN 4.0 11/24/2023    ALBUMIN 4.0 06/23/2023     Lab Results   Component Value Date    BILITOT 0.5 07/25/2024    BILITOT 0.3 11/24/2023    BILITOT 0.4 06/23/2023     Lab Results   Component Value Date    AST 14 07/25/2024    AST 13 11/24/2023    AST 16 06/23/2023     Lab Results   Component Value Date    ALT 12 07/25/2024     ALT 12 11/24/2023    ALT 12 06/23/2023     Lab Results   Component Value Date    ANIONGAP 10 07/25/2024    ANIONGAP 7 (L) 11/24/2023    ANIONGAP 11 06/23/2023     Lab Results   Component Value Date    CREATININE 0.9 07/25/2024    CREATININE 0.8 11/24/2023    CREATININE 0.8 06/23/2023     Lab Results   Component Value Date    EGFRNORACEVR >60 07/25/2024    EGFRNORACEVR >60 11/24/2023    EGFRNORACEVR >60 06/23/2023     Assessment/Plan  - Continue Wegovy 2.4 mg SQ weekly  - Will reassess in 3 months to determine appropriateness of continuation of therapy, may consider transition to Zepbound at next visit  - RTC in 3 months for follow-up evaluation    Patient consented to pharmacist management via collaborative practice.

## 2024-12-06 ENCOUNTER — HOSPITAL ENCOUNTER (OUTPATIENT)
Dept: SLEEP MEDICINE | Facility: HOSPITAL | Age: 29
Discharge: HOME | End: 2024-12-06
Attending: STUDENT IN AN ORGANIZED HEALTH CARE EDUCATION/TRAINING PROGRAM
Payer: COMMERCIAL

## 2024-12-06 DIAGNOSIS — G47.30 SLEEP APNEA, UNSPECIFIED: ICD-10-CM

## 2024-12-06 PROCEDURE — 95806 SLEEP STUDY UNATT&RESP EFFT: CPT

## 2024-12-08 DIAGNOSIS — F50.819 BINGE EATING DISORDER: ICD-10-CM

## 2024-12-08 NOTE — TELEPHONE ENCOUNTER
No care due was identified.  Health [facility] Embedded Care Due Messages. Reference number: [id number].   12/08/2024 8:27:58 AM CST

## 2024-12-09 ENCOUNTER — PATIENT MESSAGE (OUTPATIENT)
Dept: ADMINISTRATIVE | Facility: OTHER | Age: 29
End: 2024-12-09
Payer: COMMERCIAL

## 2024-12-10 RX ORDER — LISDEXAMFETAMINE DIMESYLATE 30 MG/1
30 CAPSULE ORAL EVERY MORNING
Qty: 30 CAPSULE | Refills: 0 | Status: SHIPPED | OUTPATIENT
Start: 2024-12-10

## 2024-12-11 PROBLEM — G47.30 SLEEP APNEA, UNSPECIFIED: Status: ACTIVE | Noted: 2024-12-11

## 2024-12-13 ENCOUNTER — PATIENT MESSAGE (OUTPATIENT)
Dept: NEUROLOGY | Facility: CLINIC | Age: 29
End: 2024-12-13
Payer: COMMERCIAL

## 2025-01-06 ENCOUNTER — OFFICE VISIT (OUTPATIENT)
Dept: INTERNAL MEDICINE | Facility: CLINIC | Age: 30
End: 2025-01-06
Payer: COMMERCIAL

## 2025-01-06 ENCOUNTER — HOSPITAL ENCOUNTER (OUTPATIENT)
Dept: RADIOLOGY | Facility: HOSPITAL | Age: 30
Discharge: HOME | End: 2025-01-06
Attending: INTERNAL MEDICINE
Payer: COMMERCIAL

## 2025-01-06 VITALS
HEIGHT: 68 IN | RESPIRATION RATE: 16 BRPM | WEIGHT: 243.38 LBS | SYSTOLIC BLOOD PRESSURE: 112 MMHG | OXYGEN SATURATION: 98 % | HEART RATE: 77 BPM | BODY MASS INDEX: 36.89 KG/M2 | DIASTOLIC BLOOD PRESSURE: 68 MMHG

## 2025-01-06 DIAGNOSIS — M25.551 BILATERAL HIP PAIN: ICD-10-CM

## 2025-01-06 DIAGNOSIS — M25.552 BILATERAL HIP PAIN: ICD-10-CM

## 2025-01-06 DIAGNOSIS — F50.819 BINGE EATING DISORDER, UNSPECIFIED SEVERITY: ICD-10-CM

## 2025-01-06 DIAGNOSIS — E66.812 CLASS 2 OBESITY DUE TO EXCESS CALORIES WITHOUT SERIOUS COMORBIDITY WITH BODY MASS INDEX (BMI) OF 37.0 TO 37.9 IN ADULT: Primary | ICD-10-CM

## 2025-01-06 DIAGNOSIS — F32.5 MAJOR DEPRESSIVE DISORDER WITH SINGLE EPISODE, IN FULL REMISSION: ICD-10-CM

## 2025-01-06 DIAGNOSIS — E66.09 CLASS 2 OBESITY DUE TO EXCESS CALORIES WITHOUT SERIOUS COMORBIDITY WITH BODY MASS INDEX (BMI) OF 37.0 TO 37.9 IN ADULT: Primary | ICD-10-CM

## 2025-01-06 DIAGNOSIS — K59.09 CHRONIC CONSTIPATION: ICD-10-CM

## 2025-01-06 PROCEDURE — 99215 OFFICE O/P EST HI 40 MIN: CPT | Mod: S$GLB,,, | Performed by: INTERNAL MEDICINE

## 2025-01-06 PROCEDURE — 73521 X-RAY EXAM HIPS BI 2 VIEWS: CPT | Mod: TC

## 2025-01-06 PROCEDURE — G2211 COMPLEX E/M VISIT ADD ON: HCPCS | Mod: S$GLB,,, | Performed by: INTERNAL MEDICINE

## 2025-01-06 PROCEDURE — 3078F DIAST BP <80 MM HG: CPT | Mod: CPTII,S$GLB,, | Performed by: INTERNAL MEDICINE

## 2025-01-06 PROCEDURE — 73521 X-RAY EXAM HIPS BI 2 VIEWS: CPT | Mod: 26,,, | Performed by: RADIOLOGY

## 2025-01-06 PROCEDURE — 99999 PR PBB SHADOW E&M-EST. PATIENT-LVL V: CPT | Mod: PBBFAC,,, | Performed by: INTERNAL MEDICINE

## 2025-01-06 PROCEDURE — 1160F RVW MEDS BY RX/DR IN RCRD: CPT | Mod: CPTII,S$GLB,, | Performed by: INTERNAL MEDICINE

## 2025-01-06 PROCEDURE — 3008F BODY MASS INDEX DOCD: CPT | Mod: CPTII,S$GLB,, | Performed by: INTERNAL MEDICINE

## 2025-01-06 PROCEDURE — 3074F SYST BP LT 130 MM HG: CPT | Mod: CPTII,S$GLB,, | Performed by: INTERNAL MEDICINE

## 2025-01-06 PROCEDURE — 1159F MED LIST DOCD IN RCRD: CPT | Mod: CPTII,S$GLB,, | Performed by: INTERNAL MEDICINE

## 2025-01-06 NOTE — PROGRESS NOTES
"Subjective:       Patient ID: aKthy Beltrán is a 29 y.o. female.    Chief Complaint: Follow-up (Patient is here today for a follow up visit.)      HPI:  Patient is known to me and presents for follow up weight management. She has obesity, MDD and irritable bowel with chronic constipation. Labs from 7/25/24 personally reviewed, interperted and discussed today.     A1C 4.8%, normal  LDL 59, normal  GFR > 60, normal  TSH 1.5, normal  Vit D 78, normal     Obesity: On Vyvanse for binge-eating disorder. She is doing well on current dose.   She is on [facility] weigh tloss program and started wegovy. Titrated dose up to 2.4mg weekly since I saw her last. Not much weigh tloss so feeling frustrated.   She was on Mounjaro in the past but had to stop due to finances.       Feb 2023 started on protonix. Saw GI and sx are improved and has weaned off since last visit.      Depresison: on lamictal and wellbutrin. Working well. Follows with psychiatry.      Irritable bowel with constipation: on linzess. Working ok.  Previously BM every 7-10 day  Now BM twice a week     PAP-establishedwith Dr. Kelly     Tobacco use: denies use  EtOH use: juaquin    Today she is reporting worsening joint pain. She reports pain is diffuse. She feels her pain is in the joint itself. She was diagnosed with "arthritis" as a child around age 10. She does not recall seeing a rheumatologist in her childhood. Mother has RA.   Her sister is being worked up for Amy Danlos/ She takes Aleve for pain control--taking 600mg BID on average for pain control. Sometimes uses Tylenol as well. She does have joint swelling of the knees b/l but no other joints mentioned. She wears compressing socks for LE swelling. Pain is worse in the hips. She feels her hips can easily dislocate and has pain.       Past Medical History:   Diagnosis Date    Constipation     Hydradenitis     Mental disorder        Family History   Problem Relation Name Age of Onset    Hypertension " Maternal Grandmother Katelynn     Heart disease Maternal Grandmother Katelynn     Thyroid disease Maternal Grandmother Katelynn     Hypertension Maternal Grandfather Wilber     Heart disease Maternal Grandfather Wilber     Hypertension Father Rafael     Heart disease Father Rafael     Hypertension Mother Ajit     Heart disease Mother Ajit     Asthma Mother Ajit     Asthma Sister Jennyfer     Breast cancer Neg Hx         Social History     Socioeconomic History    Marital status:    Tobacco Use    Smoking status: Never     Passive exposure: Never    Smokeless tobacco: Never   Substance and Sexual Activity    Alcohol use: Yes     Comment: Social drinker    Drug use: Never    Sexual activity: Yes     Partners: Male     Birth control/protection: I.U.D.     Comment: Dennis     Social Drivers of Health     Financial Resource Strain: Patient Declined (12/2/2024)    Overall Financial Resource Strain (CARDIA)     Difficulty of Paying Living Expenses: Patient declined   Food Insecurity: Patient Declined (12/2/2024)    Hunger Vital Sign     Worried About Running Out of Food in the Last Year: Patient declined     Ran Out of Food in the Last Year: Patient declined   Transportation Needs: No Transportation Needs (11/27/2023)    PRAPARE - Transportation     Lack of Transportation (Medical): No     Lack of Transportation (Non-Medical): No   Physical Activity: Unknown (12/2/2024)    Exercise Vital Sign     Days of Exercise per Week: Patient declined   Stress: Patient Declined (12/2/2024)    [employer] [place] [employer] [employer] - Occupational Stress Questionnaire     Feeling of Stress : Patient declined   Housing Stability: Unknown (12/2/2024)    Housing Stability Vital Sign     Unable to Pay for Housing in the Last Year: Patient declined       Review of Systems   Constitutional:  Negative for activity change, fatigue, fever and unexpected weight change.   HENT:  Negative for congestion, ear pain, hearing loss, rhinorrhea and sore  throat.    Eyes:  Negative for redness and visual disturbance.   Respiratory:  Negative for cough, shortness of breath and wheezing.    Cardiovascular:  Negative for chest pain, palpitations and leg swelling.   Gastrointestinal:  Negative for abdominal pain, constipation, diarrhea, nausea and vomiting.   Genitourinary:  Negative for dysuria, frequency, pelvic pain and urgency.   Musculoskeletal:  Positive for arthralgias and joint swelling. Negative for neck pain.   Skin:  Negative for color change, rash and wound.   Neurological:  Negative for dizziness, light-headedness and headaches.         Objective:      Physical Exam  Vitals reviewed.   Constitutional:       General: She is not in acute distress.     Appearance: She is well-developed. She is obese.   HENT:      Head: Normocephalic and atraumatic.      Right Ear: External ear normal.      Left Ear: External ear normal.      Nose: Nose normal.   Eyes:      General:         Right eye: No discharge.         Left eye: No discharge.      Conjunctiva/sclera: Conjunctivae normal.   Neck:      Thyroid: No thyromegaly.   Cardiovascular:      Rate and Rhythm: Normal rate and regular rhythm.   Pulmonary:      Effort: Pulmonary effort is normal. No respiratory distress.   Skin:     General: Skin is warm and dry.   Neurological:      Mental Status: She is alert and oriented to person, place, and time.   Psychiatric:         Behavior: Behavior normal.         Thought Content: Thought content normal.         Assessment:       1. Class 2 obesity due to excess calories without serious comorbidity with body mass index (BMI) of 37.0 to 37.9 in adult    2. Major depressive disorder with single episode, in full remission    3. Chronic constipation    4. Bilateral hip pain    5. Binge eating disorder, unspecified severity        Plan:       1. Class 2 obesity due to excess calories without serious comorbidity with body mass index (BMI) of 37.0 to 37.9 in adult  Chronic stable  Not  much weight loss with GLP-1  May be stopping soon  Management per [facility]    2. Major depressive disorder with single episode, in full remission  Chornic stable  Cont meds same dose    3. Chronic constipation  Chronic stable  Cont linzess same dose    4. Bilateral hip pain  New problem  Worsening diffuse joint pain. Worse pain in hips with feeling of dislocation  Xray today  Note diagnosed with some sort of abnormality in childhood. ?hip dysplasia, juvenile RA. Will look at imaging and decide on need for further lab work up  Start PT for apin  -     X-Ray Hip 2 or 3 views Left with Pelvis when performed; Future; Expected date: 01/06/2025  -     Ambulatory Referral/Consult to Physical Therapy; Future; Expected date: 01/13/2025    5. Binge eating disorder, unspecified severity  Chronic stable  Cont vyvanse same dose   reviewed       RTC 6 months (vyvanse) and PRN

## 2025-01-07 ENCOUNTER — PATIENT MESSAGE (OUTPATIENT)
Dept: INTERNAL MEDICINE | Facility: CLINIC | Age: 30
End: 2025-01-07
Payer: COMMERCIAL

## 2025-01-07 DIAGNOSIS — E66.812 OBESITY, CLASS II, BMI 35-39.9: Primary | ICD-10-CM

## 2025-01-07 RX ORDER — TIRZEPATIDE 7.5 MG/.5ML
7.5 INJECTION, SOLUTION SUBCUTANEOUS
Qty: 2 ML | Refills: 3 | Status: ACTIVE | OUTPATIENT
Start: 2025-01-07

## 2025-01-07 NOTE — TELEPHONE ENCOUNTER
Pt is tolerating Wegovy 2.4 mg well but would like to transition to Zepbound for improved efficacy. Will transition to therapeutically comparable dose of Zepbound per protocol

## 2025-01-08 DIAGNOSIS — K58.1 IRRITABLE BOWEL SYNDROME WITH CONSTIPATION: ICD-10-CM

## 2025-01-08 DIAGNOSIS — F50.819 BINGE EATING DISORDER: ICD-10-CM

## 2025-01-08 DIAGNOSIS — K59.09 CHRONIC CONSTIPATION: ICD-10-CM

## 2025-01-08 NOTE — TELEPHONE ENCOUNTER
No care due was identified.  Health [facility] Embedded Care Due Messages. Reference number: [id number].   1/08/2025 1:23:57 PM CST

## 2025-01-10 RX ORDER — LISDEXAMFETAMINE DIMESYLATE 30 MG/1
30 CAPSULE ORAL EVERY MORNING
Qty: 30 CAPSULE | Refills: 0 | Status: SHIPPED | OUTPATIENT
Start: 2025-01-10

## 2025-02-14 ENCOUNTER — TELEPHONE (OUTPATIENT)
Dept: VASCULAR SURGERY | Facility: CLINIC | Age: 30
End: 2025-02-14
Payer: COMMERCIAL

## 2025-02-14 NOTE — TELEPHONE ENCOUNTER
"Spoke with the pt in reference to appt scheduled via [employer] on 2/19/25.Pt informed that she would need a referral from a pcp with a dx in order to be seen in vascular surgery clinic.Pt verbalized "I was going with my family hx and symptoms.my pcp hasn't diagnosed me with anything.it may take a while".Informed her that the appt will be cancelled and contact information provided to the clinic when referral is obtained.Pt verbalized understanding of information received.                                                                                                                                                                                                                                                                                                                                                                                                                                                                                                                                                                                                                                                                                                                                                                                                                                                                                                                                                                                                                                                                                                                                                                                                                                                                                                                                                                                                                                                                                                                                     "

## 2025-02-15 DIAGNOSIS — F50.819 BINGE EATING DISORDER: ICD-10-CM

## 2025-02-15 NOTE — TELEPHONE ENCOUNTER
No care due was identified.  Health [facility] Embedded Care Due Messages. Reference number: [id number].   2/15/2025 2:55:16 PM CST

## 2025-02-17 RX ORDER — LISDEXAMFETAMINE DIMESYLATE 30 MG/1
30 CAPSULE ORAL EVERY MORNING
Qty: 30 CAPSULE | Refills: 0 | Status: SHIPPED | OUTPATIENT
Start: 2025-02-17

## 2025-03-04 ENCOUNTER — PATIENT MESSAGE (OUTPATIENT)
Dept: ADMINISTRATIVE | Facility: OTHER | Age: 30
End: 2025-03-04
Payer: COMMERCIAL

## 2025-03-23 DIAGNOSIS — F50.819 BINGE EATING DISORDER: ICD-10-CM

## 2025-03-23 NOTE — TELEPHONE ENCOUNTER
No care due was identified.  [facility] Embedded Care Due Messages. Reference number: [id number].   3/23/2025 11:36:38 AM CDT

## 2025-03-24 RX ORDER — LISDEXAMFETAMINE DIMESYLATE 30 MG/1
30 CAPSULE ORAL EVERY MORNING
Qty: 30 CAPSULE | Refills: 0 | Status: SHIPPED | OUTPATIENT
Start: 2025-03-24

## 2025-03-31 ENCOUNTER — OFFICE VISIT (OUTPATIENT)
Dept: INTERNAL MEDICINE | Facility: CLINIC | Age: 30
End: 2025-03-31
Payer: COMMERCIAL

## 2025-03-31 ENCOUNTER — PATIENT MESSAGE (OUTPATIENT)
Dept: INTERNAL MEDICINE | Facility: CLINIC | Age: 30
End: 2025-03-31

## 2025-03-31 DIAGNOSIS — E66.811 OBESITY, CLASS I, BMI 30-34.9: Primary | ICD-10-CM

## 2025-03-31 PROCEDURE — 99499 UNLISTED E&M SERVICE: CPT | Mod: 95,,,

## 2025-03-31 RX ORDER — TIRZEPATIDE 10 MG/.5ML
10 INJECTION, SOLUTION SUBCUTANEOUS
Qty: 2 ML | Refills: 2 | Status: ACTIVE | OUTPATIENT
Start: 2025-03-31

## 2025-03-31 NOTE — PROGRESS NOTES
Patient ID: Kathy Beltrán is a 29 y.o. White female    Subjective  Chief Complaint: patient presents for medical weight loss management.    Co-morbidities: none    HPI: Patient continued Wegovy with [facility] in October 2024 and is currently managed on Zepbound 7.5 mg.     Tolerance to current therapy:  Denies nausea, vomiting, diarrhea, constipation, abdominal pain    Weight loss history:  Starting weight:    6/17/2024   Recent Readings    Weight (lbs) 243 lb    BMI 36.94 BMI      Current weight:    3/30/2025   Recent Readings    Weight (lbs) 227 lb    BMI 34.51 BMI      % weight loss since GLP-1 initiation: 6.6 %    Objective  Lab Results   Component Value Date     07/25/2024     11/24/2023     06/23/2023     Lab Results   Component Value Date    K 3.8 07/25/2024    K 4.1 11/24/2023    K 4.5 06/23/2023     Lab Results   Component Value Date     07/25/2024     11/24/2023     06/23/2023     Lab Results   Component Value Date    CO2 26 07/25/2024    CO2 27 11/24/2023    CO2 23 06/23/2023     Lab Results   Component Value Date    BUN 10 07/25/2024    BUN 10 11/24/2023    BUN 12 06/23/2023     Lab Results   Component Value Date    GLU 87 07/25/2024    GLU 80 11/24/2023    GLU 94 06/23/2023     Lab Results   Component Value Date    CALCIUM 9.8 07/25/2024    CALCIUM 9.4 11/24/2023    CALCIUM 9.7 06/23/2023     Lab Results   Component Value Date    PROT 7.7 07/25/2024    PROT 7.6 11/24/2023    PROT 7.8 06/23/2023     Lab Results   Component Value Date    ALBUMIN 4.2 07/25/2024    ALBUMIN 4.0 11/24/2023    ALBUMIN 4.0 06/23/2023     Lab Results   Component Value Date    BILITOT 0.5 07/25/2024    BILITOT 0.3 11/24/2023    BILITOT 0.4 06/23/2023     Lab Results   Component Value Date    AST 14 07/25/2024    AST 13 11/24/2023    AST 16 06/23/2023     Lab Results   Component Value Date    ALT 12 07/25/2024    ALT 12 11/24/2023    ALT 12 06/23/2023     Lab Results    Component Value Date    ANIONGAP 10 07/25/2024    ANIONGAP 7 (L) 11/24/2023    ANIONGAP 11 06/23/2023     Lab Results   Component Value Date    CREATININE 0.9 07/25/2024    CREATININE 0.8 11/24/2023    CREATININE 0.8 06/23/2023     Lab Results   Component Value Date    EGFRNORACEVR >60 07/25/2024    EGFRNORACEVR >60 11/24/2023    EGFRNORACEVR >60 06/23/2023     Assessment/Plan  - Increase Zepbound 10 mg SUBQ  weekly  - RTC in 3 months for follow-up evaluation    Patient consented to pharmacist management via collaborative practice.

## 2025-04-02 ENCOUNTER — PATIENT MESSAGE (OUTPATIENT)
Dept: ADMINISTRATIVE | Facility: OTHER | Age: 30
End: 2025-04-02
Payer: COMMERCIAL

## 2025-04-13 ENCOUNTER — TELEPHONE (OUTPATIENT)
Dept: HEMATOLOGY/ONCOLOGY | Facility: CLINIC | Age: 30
End: 2025-04-13
Payer: COMMERCIAL

## 2025-04-13 DIAGNOSIS — F32.5 MAJOR DEPRESSIVE DISORDER WITH SINGLE EPISODE, IN FULL REMISSION: Primary | ICD-10-CM

## 2025-04-13 DIAGNOSIS — K58.1 IRRITABLE BOWEL SYNDROME WITH CONSTIPATION: ICD-10-CM

## 2025-04-13 DIAGNOSIS — K59.09 CHRONIC CONSTIPATION: ICD-10-CM

## 2025-04-14 NOTE — TELEPHONE ENCOUNTER
No care due was identified.  Health [facility] Embedded Care Due Messages. Reference number: [id number].   4/13/2025 7:42:34 PM CDT

## 2025-04-22 ENCOUNTER — PATIENT MESSAGE (OUTPATIENT)
Dept: INTERNAL MEDICINE | Facility: CLINIC | Age: 30
End: 2025-04-22
Payer: COMMERCIAL

## 2025-04-29 ENCOUNTER — PATIENT MESSAGE (OUTPATIENT)
Dept: ADMINISTRATIVE | Facility: OTHER | Age: 30
End: 2025-04-29
Payer: COMMERCIAL

## 2025-04-29 DIAGNOSIS — F50.819 BINGE EATING DISORDER: ICD-10-CM

## 2025-04-29 RX ORDER — LISDEXAMFETAMINE DIMESYLATE 30 MG/1
30 CAPSULE ORAL EVERY MORNING
Qty: 30 CAPSULE | Refills: 0 | Status: SHIPPED | OUTPATIENT
Start: 2025-04-29

## 2025-04-29 NOTE — TELEPHONE ENCOUNTER
Care Due:                  Date            Visit Type   Department     Provider  --------------------------------------------------------------------------------                                MYCHART                              FOLLOWUP/OF  [facility] INTER[facility]  Last Visit: 01-      FICE VISIT   MEDICINE II    Georgina Lora                              EP -                              PRIMARY      [facility] INTERNAL  Next Visit: 07-      CARE ([facility])   MEDICINE II    Georgina Lora                                                            Last  Test          Frequency    Reason                     Performed    Due Date  --------------------------------------------------------------------------------    CMP.........  12 months..  lactulose................  07- 07-    Health [facility] Embedded Care Due Messages. Reference number: [id number].   4/29/2025 2:00:58 PM CDT

## 2025-06-02 DIAGNOSIS — F50.819 BINGE EATING DISORDER: ICD-10-CM

## 2025-06-02 RX ORDER — LISDEXAMFETAMINE DIMESYLATE 30 MG/1
30 CAPSULE ORAL EVERY MORNING
Qty: 30 CAPSULE | Refills: 0 | Status: SHIPPED | OUTPATIENT
Start: 2025-06-02

## 2025-06-03 ENCOUNTER — PATIENT MESSAGE (OUTPATIENT)
Dept: ADMINISTRATIVE | Facility: OTHER | Age: 30
End: 2025-06-03
Payer: COMMERCIAL

## 2025-06-17 ENCOUNTER — PATIENT MESSAGE (OUTPATIENT)
Dept: INTERNAL MEDICINE | Facility: CLINIC | Age: 30
End: 2025-06-17
Payer: COMMERCIAL

## 2025-06-19 NOTE — PROGRESS NOTES
Patient ID: Kathy Beltrán is a 30 y.o. White female    Subjective  Chief Complaint: patient presents for medical weight loss management.    Co-morbidities: none    HPI: Patient continued [facility]gov[facility] with [facility] in October 2024 and is currently managed on Zepbound 10 mg. Hx of BED noted in chart. Pt reviewed by Dr. Sabina Barton with approval for therapy. Pt had a previous procedure completed in early May resulting in stent placed in leg and has been limited during recovery.    Tolerance to current therapy:  Denies nausea, vomiting, diarrhea, constipation, abdominal pain    Weight loss history:  Starting weight:    6/17/2024   Recent Readings    Weight (lbs) 243 lb    BMI 36.94 BMI    Current weight:   223 lbs - pt reported  % weight loss since GLP-1 initiation: 8.2 %    Objective  Lab Results   Component Value Date     07/25/2024     11/24/2023     06/23/2023     Lab Results   Component Value Date    K 3.8 07/25/2024    K 4.1 11/24/2023    K 4.5 06/23/2023     Lab Results   Component Value Date     07/25/2024     11/24/2023     06/23/2023     Lab Results   Component Value Date    CO2 26 07/25/2024    CO2 27 11/24/2023    CO2 23 06/23/2023     Lab Results   Component Value Date    BUN 10 07/25/2024    BUN 10 11/24/2023    BUN 12 06/23/2023     Lab Results   Component Value Date    GLU 87 07/25/2024    GLU 80 11/24/2023    GLU 94 06/23/2023     Lab Results   Component Value Date    CALCIUM 9.8 07/25/2024    CALCIUM 9.4 11/24/2023    CALCIUM 9.7 06/23/2023     Lab Results   Component Value Date    PROT 7.7 07/25/2024    PROT 7.6 11/24/2023    PROT 7.8 06/23/2023     Lab Results   Component Value Date    ALBUMIN 4.2 07/25/2024    ALBUMIN 4.0 11/24/2023    ALBUMIN 4.0 06/23/2023     Lab Results   Component Value Date    BILITOT 0.5 07/25/2024    BILITOT 0.3 11/24/2023    BILITOT 0.4 06/23/2023     Lab Results   Component Value Date    AST 14 07/25/2024    AST 13  11/24/2023    AST 16 06/23/2023     Lab Results   Component Value Date    ALT 12 07/25/2024    ALT 12 11/24/2023    ALT 12 06/23/2023     Lab Results   Component Value Date    ANIONGAP 10 07/25/2024    ANIONGAP 7 (L) 11/24/2023    ANIONGAP 11 06/23/2023     Lab Results   Component Value Date    CREATININE 0.9 07/25/2024    CREATININE 0.8 11/24/2023    CREATININE 0.8 06/23/2023     Lab Results   Component Value Date    EGFRNORACEVR >60 07/25/2024    EGFRNORACEVR >60 11/24/2023    EGFRNORACEVR >60 06/23/2023     Assessment/Plan  - Increase Zepbound 12.5 mg SUBQ  weekly  - RTC in 3 months for follow-up evaluation    Patient consented to pharmacist management via collaborative practice.

## 2025-06-20 ENCOUNTER — PATIENT MESSAGE (OUTPATIENT)
Dept: INTERNAL MEDICINE | Facility: CLINIC | Age: 30
End: 2025-06-20

## 2025-06-20 ENCOUNTER — OFFICE VISIT (OUTPATIENT)
Dept: INTERNAL MEDICINE | Facility: CLINIC | Age: 30
End: 2025-06-20
Payer: COMMERCIAL

## 2025-06-20 DIAGNOSIS — E66.811 OBESITY, CLASS I, BMI 30-34.9: Primary | ICD-10-CM

## 2025-06-20 RX ORDER — TIRZEPATIDE 12.5 MG/.5ML
12.5 INJECTION, SOLUTION SUBCUTANEOUS
Qty: 2 ML | Refills: 2 | Status: ACTIVE | OUTPATIENT
Start: 2025-06-20

## 2025-06-24 ENCOUNTER — PATIENT MESSAGE (OUTPATIENT)
Dept: HEMATOLOGY/ONCOLOGY | Facility: CLINIC | Age: 30
End: 2025-06-24
Payer: COMMERCIAL

## 2025-06-24 ENCOUNTER — DOCUMENTATION ONLY (OUTPATIENT)
Dept: HEMATOLOGY/ONCOLOGY | Facility: CLINIC | Age: 30
End: 2025-06-24
Payer: COMMERCIAL

## 2025-06-24 NOTE — PROGRESS NOTES
"Pharmacogenomics (PGx) Consult     Chief Complaint: Kathy Beltrán  has undergone pharmacogenomic testing via self-enrollment in the [employer] PGx . Patient completed sample collection on 4/19/2025 and results were provided on 5/9/2025.     Test results: Pharmacogenomic results can be found in GENOMIC INDICATORS    Past Medical History:  -------------------------------------    Constipation    Hydradenitis    Mental disorder        Allergies:   Review of patient's allergies indicates:   Allergen Reactions    Pcn [penicillins]      Was told had swelling as a baby    Sulfa (sulfonamide antibiotics)      hives        Medications: Current Medications[1]      Clinical Recommendations based on PGx    The patient is not currently on any medications that need adjustment based on pharmacogenomic results.     -Of note, patient recently switched from clopidogrel to prasugrel on 5/15 by outside cardiologist d/t clopidogrel non-responder (ASU0O34 intermediate metabolizer) result s/p an illiac stent placed in early May.     Future Medication Considerations based on PGx    -Patient has 2 PGx results that can impact future medication selection- please see "Clinically Actionable PGx Results" for full list of medication interactions    -If there is an interaction with any future medications and the patient's genetic results, a clinical alert will fire for both providers and pharmacists. These interactions are reviewed by the PGx team and updated as new data becomes available.      -PGx information can be accessed by patients within [facility]- results with "actionable result DETECTED" should be communicated to providers outside of [facility] as our clinical alerts will NOT fire    Clinically Actionable PGx Results   *Note, actionable recommendations may change based on updates to existing PGx literature. For the most up to date medication recommendations based on patients' results, please view the GENOMIC INDICATORS module in " Epic.      [id number] Intermediate Metabolizer       Genomic results predict that this patient may metabolize CPI7U40 substrates at a rate that falls below average metabolic capacity. Thus, this patient may have an increased risk of adverse or poor response to medications metabolized by XPC9O44. To avoid these types of responses, dose adjustments or alternative therapeutic agents may be necessary when medications metabolized by RNY6Y32 are being considered.        Intermediate Metabolizer of Clopidogrel          Genetic results for this patient indicate increased risk of treatment failure with clopidogrel (PLAVIX), based on OZA5T73 genotype. Consider an alternative drug such as prasugrel or ticagrelor.     See CPIC clinical guidelines for more information.                 Intermediate Metabolizer of Dexlansoprazole        This patient has a YOB6G68 genomic indicator which indicates reduced metabolism of pantoprazole, omeprazole, lansoprazole, dexlansoprazole and increased risk of side effects. For chronic therapy (>12 weeks) consider 50% reduction of dose. Esomeprazole and raberprazole are not affected and may be used at normal doses.     See CPIC clinical guidelines for more information.                 Intermediate Metabolizer of Lansoprazole        This patient has a USM5B81 genomic indicator which indicates reduced metabolism of pantoprazole, omeprazole, lansoprazole, dexlansoprazole and increased risk of side effects. For chronic therapy (>12 weeks) consider 50% reduction of dose. Esomeprazole and raberprazole are not affected and may be used at normal doses.     See CPIC clinical guidelines for more information.                 Intermediate Metabolizer of Omeprazole        This patient has a TGL0Y44 genomic indicator which indicates reduced metabolism of pantoprazole, omeprazole, lansoprazole, dexlansoprazole and increased risk of side effects. For chronic therapy (>12 weeks) consider 50% reduction of dose.  Esomeprazole and raberprazole are not affected and may be used at normal doses.     See CPIC clinical guidelines for more information.                 Intermediate Metabolizer of Pantoprazole        This patient has a XAE1X60 genomic indicator which indicates reduced metabolism of pantoprazole, omeprazole, lansoprazole, dexlansoprazole and increased risk of side effects. For chronic therapy (>12 weeks) consider 50% reduction of dose. Esomeprazole and raberprazole are not affected and may be used at normal doses.     See CPIC clinical guidelines for more information.                    CY Intermediate Metabolizer       Genomic results predict that this patient may metabolize CY substrates at a rate that falls below metabolic capacity. For some populations, absence of functional CY is the normal state. Patients with this genotype may require higher starting doses of certain medications metabolized by CY, including tacrolimus. For questions, call [phone]) or order PGx Consult [[id number]].        Intermediate Metabolizer of Tacrolimus        This patient has a CY genomic indicator which indicates decreased chance of achieving target tacrolimus concentrations. Increase starting dose to 1.5 to 2 times the standard dose.     See [facility]IC clinical guidelines for more information.                -For any additional questions, please don't hesitate to reach out to me or contact [phone] ([phone]).    Elvira Sanchez, PharmD  Clinical Pharmacogenomics Pharmacist                 [1]   aspirin 81 MG Chew    clindamycin (CLEOCIN T) 1 % external solution    doxycycline (VIBRAMYCIN) 100 MG Cap    lamoTRIgine (LAMICTAL) 200 MG tablet    linaCLOtide (LINZESS) 145 mcg Cap capsule    lisdexamfetamine (VYVANSE) 30 MG capsule    metronidazole 1% (METROGEL) 1 % Gel    prasugreL HCl (EFFIENT) 10 mg Tab    spironolactone (ALDACTONE) 100 MG tablet    tirzepatide, weight loss, (ZEPBOUND) 12.5 mg/0.5 mL PnIj

## 2025-06-24 NOTE — Clinical Note
Hello,   Just wanted to bring to your awareness that Kathy Beltrán  had pharmacogenomics (PGx) testing done through a  initiative [facility] is currently offering for select patients who are taking medications that may be impacted by genetic results.   As part of this , I have reviewed their genetic results and current medications to help provide insight into current medication optimization options, as well as future medication choices as appropriate.   For any future medications prescribed by any [facility] provider, if there is an interaction with the medication and the patient's genetic results, a clinical alert will fire for both providers and pharmacists. These interactions are reviewed by the PGx team monthly and updated as new data becomes available.   If you have any questions about the results or PGx at [facility] in general, please don't hesitate to reach out to me directly.   Elvira Thomas, PharmD Clinical Pharmacogenomics Pharmacist

## 2025-07-01 ENCOUNTER — PATIENT MESSAGE (OUTPATIENT)
Dept: ADMINISTRATIVE | Facility: OTHER | Age: 30
End: 2025-07-01
Payer: COMMERCIAL

## 2025-07-06 DIAGNOSIS — K58.1 IRRITABLE BOWEL SYNDROME WITH CONSTIPATION: ICD-10-CM

## 2025-07-06 DIAGNOSIS — K59.09 CHRONIC CONSTIPATION: ICD-10-CM

## 2025-07-07 NOTE — TELEPHONE ENCOUNTER
No care due was identified.  [facility] Embedded Care Due Messages. Reference number: [id number].   7/06/2025 8:19:49 PM CDT

## 2025-07-09 ENCOUNTER — OFFICE VISIT (OUTPATIENT)
Dept: INTERNAL MEDICINE | Facility: CLINIC | Age: 30
End: 2025-07-09
Payer: COMMERCIAL

## 2025-07-09 ENCOUNTER — LAB VISIT (OUTPATIENT)
Dept: LAB | Facility: HOSPITAL | Age: 30
End: 2025-07-09
Attending: INTERNAL MEDICINE
Payer: COMMERCIAL

## 2025-07-09 VITALS
SYSTOLIC BLOOD PRESSURE: 112 MMHG | BODY MASS INDEX: 34.35 KG/M2 | HEIGHT: 68 IN | DIASTOLIC BLOOD PRESSURE: 78 MMHG | WEIGHT: 226.63 LBS | HEART RATE: 108 BPM | OXYGEN SATURATION: 100 %

## 2025-07-09 DIAGNOSIS — G43.711 CHRONIC MIGRAINE WITHOUT AURA, WITH INTRACTABLE MIGRAINE, SO STATED, WITH STATUS MIGRAINOSUS: ICD-10-CM

## 2025-07-09 DIAGNOSIS — E66.09 CLASS 1 OBESITY DUE TO EXCESS CALORIES WITHOUT SERIOUS COMORBIDITY WITH BODY MASS INDEX (BMI) OF 34.0 TO 34.9 IN ADULT: ICD-10-CM

## 2025-07-09 DIAGNOSIS — I87.1 MAY-THURNER SYNDROME: ICD-10-CM

## 2025-07-09 DIAGNOSIS — F50.819 BINGE EATING DISORDER, UNSPECIFIED SEVERITY: ICD-10-CM

## 2025-07-09 DIAGNOSIS — E66.811 CLASS 1 OBESITY DUE TO EXCESS CALORIES WITHOUT SERIOUS COMORBIDITY WITH BODY MASS INDEX (BMI) OF 34.0 TO 34.9 IN ADULT: Primary | ICD-10-CM

## 2025-07-09 DIAGNOSIS — F32.5 MAJOR DEPRESSIVE DISORDER WITH SINGLE EPISODE, IN FULL REMISSION: ICD-10-CM

## 2025-07-09 DIAGNOSIS — E66.811 CLASS 1 OBESITY DUE TO EXCESS CALORIES WITHOUT SERIOUS COMORBIDITY WITH BODY MASS INDEX (BMI) OF 34.0 TO 34.9 IN ADULT: ICD-10-CM

## 2025-07-09 DIAGNOSIS — E66.09 CLASS 1 OBESITY DUE TO EXCESS CALORIES WITHOUT SERIOUS COMORBIDITY WITH BODY MASS INDEX (BMI) OF 34.0 TO 34.9 IN ADULT: Primary | ICD-10-CM

## 2025-07-09 LAB
ABSOLUTE EOSINOPHIL (OHS): 0.08 K/UL
ABSOLUTE MONOCYTE (OHS): 0.64 K/UL (ref 0.3–1)
ABSOLUTE NEUTROPHIL COUNT (OHS): 5.26 K/UL (ref 1.8–7.7)
ALBUMIN SERPL BCP-MCNC: 4.2 G/DL (ref 3.5–5.2)
ALP SERPL-CCNC: 60 UNIT/L (ref 40–150)
ALT SERPL W/O P-5'-P-CCNC: 11 UNIT/L (ref 10–44)
ANION GAP (OHS): 8 MMOL/L (ref 8–16)
AST SERPL-CCNC: 13 UNIT/L (ref 11–45)
BASOPHILS # BLD AUTO: 0.04 K/UL
BASOPHILS NFR BLD AUTO: 0.5 %
BILIRUB SERPL-MCNC: 0.4 MG/DL (ref 0.1–1)
BUN SERPL-MCNC: 10 MG/DL (ref 6–20)
CALCIUM SERPL-MCNC: 9.7 MG/DL (ref 8.7–10.5)
CHLORIDE SERPL-SCNC: 103 MMOL/L (ref 95–110)
CHOLEST SERPL-MCNC: 135 MG/DL (ref 120–199)
CHOLEST/HDLC SERPL: 2.3 {RATIO} (ref 2–5)
CO2 SERPL-SCNC: 25 MMOL/L (ref 23–29)
CREAT SERPL-MCNC: 0.8 MG/DL (ref 0.5–1.4)
EAG (OHS): 82 MG/DL (ref 68–131)
ERYTHROCYTE [DISTWIDTH] IN BLOOD BY AUTOMATED COUNT: 12.8 % (ref 11.5–14.5)
GFR SERPLBLD CREATININE-BSD FMLA CKD-EPI: >60 ML/MIN/1.73/M2
GLUCOSE SERPL-MCNC: 86 MG/DL (ref 70–110)
HBA1C MFR BLD: 4.5 % (ref 4–5.6)
HCT VFR BLD AUTO: 43.1 % (ref 37–48.5)
HDLC SERPL-MCNC: 60 MG/DL (ref 40–75)
HDLC SERPL: 44.4 % (ref 20–50)
HGB BLD-MCNC: 14.9 GM/DL (ref 12–16)
IMM GRANULOCYTES # BLD AUTO: 0.02 K/UL (ref 0–0.04)
IMM GRANULOCYTES NFR BLD AUTO: 0.2 % (ref 0–0.5)
LDLC SERPL CALC-MCNC: 57.2 MG/DL (ref 63–159)
LYMPHOCYTES # BLD AUTO: 2.37 K/UL (ref 1–4.8)
MCH RBC QN AUTO: 29.6 PG (ref 27–31)
MCHC RBC AUTO-ENTMCNC: 34.6 G/DL (ref 32–36)
MCV RBC AUTO: 86 FL (ref 82–98)
NONHDLC SERPL-MCNC: 75 MG/DL
NUCLEATED RBC (/100WBC) (OHS): 0 /100 WBC
PLATELET # BLD AUTO: 305 K/UL (ref 150–450)
PMV BLD AUTO: 9.1 FL (ref 9.2–12.9)
POTASSIUM SERPL-SCNC: 4.3 MMOL/L (ref 3.5–5.1)
PROT SERPL-MCNC: 7.8 GM/DL (ref 6–8.4)
RBC # BLD AUTO: 5.04 M/UL (ref 4–5.4)
RELATIVE EOSINOPHIL (OHS): 1 %
RELATIVE LYMPHOCYTE (OHS): 28.2 % (ref 18–48)
RELATIVE MONOCYTE (OHS): 7.6 % (ref 4–15)
RELATIVE NEUTROPHIL (OHS): 62.5 % (ref 38–73)
SODIUM SERPL-SCNC: 136 MMOL/L (ref 136–145)
TRIGL SERPL-MCNC: 89 MG/DL (ref 30–150)
TSH SERPL-ACNC: 1.62 UIU/ML (ref 0.4–4)
WBC # BLD AUTO: 8.41 K/UL (ref 3.9–12.7)

## 2025-07-09 PROCEDURE — 83036 HEMOGLOBIN GLYCOSYLATED A1C: CPT

## 2025-07-09 PROCEDURE — 82040 ASSAY OF SERUM ALBUMIN: CPT

## 2025-07-09 PROCEDURE — 36415 COLL VENOUS BLD VENIPUNCTURE: CPT

## 2025-07-09 PROCEDURE — 84443 ASSAY THYROID STIM HORMONE: CPT

## 2025-07-09 PROCEDURE — 99999 PR PBB SHADOW E&M-EST. PATIENT-LVL III: CPT | Mod: PBBFAC,,, | Performed by: INTERNAL MEDICINE

## 2025-07-09 PROCEDURE — 85025 COMPLETE CBC W/AUTO DIFF WBC: CPT

## 2025-07-09 PROCEDURE — 82465 ASSAY BLD/SERUM CHOLESTEROL: CPT

## 2025-07-09 RX ORDER — LAMOTRIGINE 250 MG/1
1 TABLET, EXTENDED RELEASE ORAL
COMMUNITY

## 2025-07-09 RX ORDER — LISDEXAMFETAMINE DIMESYLATE 50 MG/1
50 CAPSULE ORAL EVERY MORNING
COMMUNITY

## 2025-07-09 NOTE — PROGRESS NOTES
Subjective:       Patient ID: Kathy Beltrán is a 30 y.o. female.    Chief Complaint: Follow-up (6 mo f/u)      HPI:  Patient is known to me and presents for follow up weight management. She has obesity, MDD and irritable bowel with chronic constipation.   No new labs prior to today's visit. Will update yearly labs after visit     Obesity: On Vyvanse for binge-eating disorder. She is doing well on current dose.  She sees outside psychiatry who recently increase to 50 mg daily and denies side effects  She is on [facility]'s weigh tloss program and started Zepbound. Titrated dose up to 12.5 mg weekly since I saw her last.       Feb 2023 started on protonix. Saw GI and sx are improved and has weaned off since last visit.      Depresison:  on lamictal and off wellbutrin. Follows with psychiatry who is currently managing medications.  Reports her symptoms are well-controlled.     Irritable bowel with constipation: on linzess. Working well.  Previously BM every 7-10 day  Now BM twice a week     PAP-established with Dr. Kelly    On aldactone 150mg per outside dermatology.  Again, working well.       Since I saw her last diagnosed May-thurner syndrome. She has stent in left iliac vein placed by [facility]. Now on effient 10mg.  She has easy bruising but otherwise tolerating well.  We will obtain outside records     Tobacco use: denies use  EtOH use: juaquin            Past Medical History:   Diagnosis Date    Constipation     Hydradenitis     May-Thurner syndrome     Mental disorder     POTS (postural orthostatic tachycardia syndrome)        Family History   Problem Relation Name Age of Onset    Hypertension Maternal Grandmother Katelynn     Heart disease Maternal Grandmother Katelynn     Thyroid disease Maternal Grandmother Katelynn     Hypertension Maternal Grandfather Wilber     Heart disease Maternal Grandfather Wilber     Hypertension Father Rafael     Heart disease Father Rafael     Hypertension Mother Ajit     Heart disease Mother Ajit      Asthma Mother Ajit     Asthma Sister Jennyfer     Breast cancer Neg Hx         Social History[1]    Review of Systems   Constitutional:  Negative for activity change, fatigue, fever and unexpected weight change.   HENT:  Negative for congestion, ear pain, hearing loss, rhinorrhea, sore throat and tinnitus.    Eyes:  Negative for redness and visual disturbance.   Respiratory:  Negative for cough, shortness of breath and wheezing.    Cardiovascular:  Negative for chest pain, palpitations and leg swelling.   Gastrointestinal:  Negative for abdominal pain, blood in stool, constipation, diarrhea, nausea and vomiting.   Genitourinary:  Negative for dysuria, frequency, pelvic pain and urgency.   Musculoskeletal:  Negative for back pain, joint swelling and neck pain.   Skin:  Negative for color change, rash and wound.        Bruising   Neurological:  Negative for dizziness, light-headedness and headaches.         Objective:      Physical Exam  Vitals reviewed.   Constitutional:       General: She is not in acute distress.     Appearance: She is well-developed.   HENT:      Head: Normocephalic and atraumatic.      Right Ear: External ear normal.      Left Ear: External ear normal.      Nose: Nose normal.   Eyes:      General:         Right eye: No discharge.         Left eye: No discharge.      Conjunctiva/sclera: Conjunctivae normal.   Neck:      Thyroid: No thyromegaly.   Cardiovascular:      Rate and Rhythm: Normal rate and regular rhythm.      Heart sounds: No murmur heard.  Pulmonary:      Effort: Pulmonary effort is normal. No respiratory distress.      Breath sounds: Normal breath sounds. No wheezing.   Abdominal:      General: There is no distension.      Palpations: Abdomen is soft.      Tenderness: There is no abdominal tenderness.   Skin:     General: Skin is warm and dry.   Neurological:      Mental Status: She is alert and oriented to person, place, and time. Mental status is at baseline.   Psychiatric:          Behavior: Behavior normal.         Thought Content: Thought content normal.         Assessment:       1. Class 2 obesity due to excess calories without serious comorbidity with body mass index (BMI) of 37.0 to 37.9 in adult    2. Binge eating disorder, unspecified severity    3. Chronic migraine without aura, with intractable migraine, so stated, with status migrainosus    4. Major depressive disorder with single episode, in full remission    5. May-Thurner syndrome        Plan:       1. Class 1 obesity due to excess calories without serious comorbidity with body mass index (BMI) of 34.0 to 34.9 in adult  Chronic improving  Continue medications same dose  Continue follow up and weight management clinic  -     CBC Auto Differential; Future; Expected date: 07/09/2025  -     Comprehensive Metabolic Panel; Future; Expected date: 07/09/2025  -     TSH; Future; Expected date: 07/09/2025  -     Lipid Panel; Future; Expected date: 07/09/2025  -     Hemoglobin A1C; Future; Expected date: 07/09/2025    2. Binge eating disorder, unspecified severity  Chronic improving  Continue Vyvanse-now prescribed by outside psychiatric provider    3. Chronic migraine without aura, with intractable migraine, so stated, with status migrainosus  Chronic stable  No acute symptoms today  Continue to monitor clinically    4. Major depressive disorder with single episode, in full remission  Uncontrolled  Continue medications same dose per psychiatry  Continue follow up with Psychiatry as planned    5. May-Thurner syndrome  New diagnosis to this provider  Has seen cardiology since our last visit with stent placement and iliac vein  Now on prasugrel.  We will continue       Return to clinic 1 year and PRN pending labs               [1]   Social History  Socioeconomic History    Marital status:    Tobacco Use    Smoking status: Never     Passive exposure: Never    Smokeless tobacco: Never   Substance and Sexual Activity    Alcohol use:  Yes     Comment: Social drinker    Drug use: Never    Sexual activity: Yes     Partners: Male     Birth control/protection: I.U.D.     Comment: Dennis     Social Drivers of Health     Financial Resource Strain: Patient Declined (12/2/2024)    Overall Financial Resource Strain (CARDIA)     Difficulty of Paying Living Expenses: Patient declined   Food Insecurity: Patient Declined (12/2/2024)    Hunger Vital Sign     Worried About Running Out of Food in the Last Year: Patient declined     Ran Out of Food in the Last Year: Patient declined   Transportation Needs: No Transportation Needs (11/27/2023)    PRAPARE - Transportation     Lack of Transportation (Medical): No     Lack of Transportation (Non-Medical): No   Physical Activity: Unknown (12/2/2024)    Exercise Vital Sign     Days of Exercise per Week: Patient declined   Stress: Patient Declined (12/2/2024)    [employer] - Occupational Stress Questionnaire     Feeling of Stress : Patient declined   Housing Stability: Unknown (12/2/2024)    Housing Stability Vital Sign     Unable to Pay for Housing in the Last Year: Patient declined

## 2025-07-28 ENCOUNTER — PATIENT MESSAGE (OUTPATIENT)
Dept: ADMINISTRATIVE | Facility: OTHER | Age: 30
End: 2025-07-28
Payer: COMMERCIAL

## 2025-08-28 ENCOUNTER — PATIENT MESSAGE (OUTPATIENT)
Dept: ADMINISTRATIVE | Facility: OTHER | Age: 30
End: 2025-08-28
Payer: COMMERCIAL

## (undated) DEVICE — SUT CTD VICRYL 3-0 PS-1

## (undated) DEVICE — APPLICATOR CHLORAPREP ORN 26ML

## (undated) DEVICE — TUBING LAPARSCOPIC INSUFFLATIN

## (undated) DEVICE — SOL NS 1000CC

## (undated) DEVICE — ADHESIVE DERMABOND ADVANCED

## (undated) DEVICE — Device

## (undated) DEVICE — NDL INSUF ULTRA VERESS 120MM